# Patient Record
Sex: FEMALE | Race: WHITE | Employment: OTHER | URBAN - METROPOLITAN AREA
[De-identification: names, ages, dates, MRNs, and addresses within clinical notes are randomized per-mention and may not be internally consistent; named-entity substitution may affect disease eponyms.]

---

## 2017-02-10 DIAGNOSIS — I48.0 PAROXYSMAL ATRIAL FIBRILLATION (HCC): ICD-10-CM

## 2017-02-10 DIAGNOSIS — E55.9 VITAMIN D DEFICIENCY: ICD-10-CM

## 2017-02-10 DIAGNOSIS — E78.2 MIXED HYPERLIPIDEMIA: ICD-10-CM

## 2017-02-10 DIAGNOSIS — E53.8 B12 DEFICIENCY: ICD-10-CM

## 2017-02-10 LAB
A/G RATIO: 1.5 (ref 1.1–2.2)
ALBUMIN SERPL-MCNC: 4.4 G/DL (ref 3.4–5)
ALP BLD-CCNC: 77 U/L (ref 40–129)
ALT SERPL-CCNC: 14 U/L (ref 10–40)
ANION GAP SERPL CALCULATED.3IONS-SCNC: 13 MMOL/L (ref 3–16)
AST SERPL-CCNC: 18 U/L (ref 15–37)
BASOPHILS ABSOLUTE: 0 K/UL (ref 0–0.2)
BASOPHILS RELATIVE PERCENT: 0.2 %
BILIRUB SERPL-MCNC: 0.7 MG/DL (ref 0–1)
BUN BLDV-MCNC: 20 MG/DL (ref 7–20)
CALCIUM SERPL-MCNC: 9.5 MG/DL (ref 8.3–10.6)
CHLORIDE BLD-SCNC: 99 MMOL/L (ref 99–110)
CHOLESTEROL, TOTAL: 201 MG/DL (ref 0–199)
CO2: 27 MMOL/L (ref 21–32)
CREAT SERPL-MCNC: 0.9 MG/DL (ref 0.6–1.2)
EOSINOPHILS ABSOLUTE: 0 K/UL (ref 0–0.6)
EOSINOPHILS RELATIVE PERCENT: 0.1 %
GFR AFRICAN AMERICAN: >60
GFR NON-AFRICAN AMERICAN: >60
GLOBULIN: 2.9 G/DL
GLUCOSE BLD-MCNC: 98 MG/DL (ref 70–99)
HCT VFR BLD CALC: 43.4 % (ref 36–48)
HDLC SERPL-MCNC: 51 MG/DL (ref 40–60)
HEMOGLOBIN: 14.4 G/DL (ref 12–16)
LDL CHOLESTEROL CALCULATED: 125 MG/DL
LYMPHOCYTES ABSOLUTE: 1.7 K/UL (ref 1–5.1)
LYMPHOCYTES RELATIVE PERCENT: 38.2 %
MCH RBC QN AUTO: 30.1 PG (ref 26–34)
MCHC RBC AUTO-ENTMCNC: 33.1 G/DL (ref 31–36)
MCV RBC AUTO: 90.8 FL (ref 80–100)
MONOCYTES ABSOLUTE: 0.4 K/UL (ref 0–1.3)
MONOCYTES RELATIVE PERCENT: 9.8 %
NEUTROPHILS ABSOLUTE: 2.3 K/UL (ref 1.7–7.7)
NEUTROPHILS RELATIVE PERCENT: 51.7 %
PDW BLD-RTO: 12.7 % (ref 12.4–15.4)
PLATELET # BLD: 170 K/UL (ref 135–450)
PMV BLD AUTO: 8.2 FL (ref 5–10.5)
POTASSIUM SERPL-SCNC: 4.9 MMOL/L (ref 3.5–5.1)
RBC # BLD: 4.78 M/UL (ref 4–5.2)
SODIUM BLD-SCNC: 139 MMOL/L (ref 136–145)
TOTAL PROTEIN: 7.3 G/DL (ref 6.4–8.2)
TRIGL SERPL-MCNC: 125 MG/DL (ref 0–150)
VLDLC SERPL CALC-MCNC: 25 MG/DL
WBC # BLD: 4.5 K/UL (ref 4–11)

## 2017-02-11 LAB
T4 FREE: 1.6 NG/DL (ref 0.9–1.8)
TSH SERPL DL<=0.05 MIU/L-ACNC: 1.27 UIU/ML (ref 0.27–4.2)
VITAMIN B-12: 556 PG/ML (ref 211–911)
VITAMIN D 25-HYDROXY: 37.8 NG/ML

## 2017-02-15 ENCOUNTER — OFFICE VISIT (OUTPATIENT)
Dept: INTERNAL MEDICINE CLINIC | Age: 77
End: 2017-02-15

## 2017-02-15 VITALS
HEART RATE: 71 BPM | BODY MASS INDEX: 27.49 KG/M2 | DIASTOLIC BLOOD PRESSURE: 73 MMHG | WEIGHT: 161 LBS | HEIGHT: 64 IN | RESPIRATION RATE: 17 BRPM | SYSTOLIC BLOOD PRESSURE: 123 MMHG | OXYGEN SATURATION: 98 %

## 2017-02-15 DIAGNOSIS — E53.8 B12 DEFICIENCY: ICD-10-CM

## 2017-02-15 DIAGNOSIS — E78.2 MIXED HYPERLIPIDEMIA: ICD-10-CM

## 2017-02-15 DIAGNOSIS — Z12.31 VISIT FOR SCREENING MAMMOGRAM: ICD-10-CM

## 2017-02-15 DIAGNOSIS — E55.9 VITAMIN D DEFICIENCY: ICD-10-CM

## 2017-02-15 DIAGNOSIS — E03.4 HYPOTHYROIDISM DUE TO ACQUIRED ATROPHY OF THYROID: ICD-10-CM

## 2017-02-15 DIAGNOSIS — I48.0 PAROXYSMAL ATRIAL FIBRILLATION (HCC): Primary | ICD-10-CM

## 2017-02-15 DIAGNOSIS — J30.89 PERENNIAL ALLERGIC RHINITIS, UNSPECIFIED ALLERGIC RHINITIS TRIGGER: ICD-10-CM

## 2017-02-15 DIAGNOSIS — M81.0 OSTEOPOROSIS: ICD-10-CM

## 2017-02-15 PROCEDURE — 3288F FALL RISK ASSESSMENT DOCD: CPT | Performed by: INTERNAL MEDICINE

## 2017-02-15 PROCEDURE — 99214 OFFICE O/P EST MOD 30 MIN: CPT | Performed by: INTERNAL MEDICINE

## 2017-02-15 PROCEDURE — G8510 SCR DEP NEG, NO PLAN REQD: HCPCS | Performed by: INTERNAL MEDICINE

## 2017-02-15 RX ORDER — LEVOTHYROXINE SODIUM 137 UG/1
137 TABLET ORAL DAILY
Qty: 90 TABLET | Refills: 1 | Status: SHIPPED | OUTPATIENT
Start: 2017-02-15 | End: 2017-08-21 | Stop reason: SDUPTHER

## 2017-02-15 RX ORDER — PROPAFENONE HYDROCHLORIDE 150 MG/1
150 TABLET, FILM COATED ORAL EVERY 8 HOURS
Qty: 90 TABLET | Refills: 3 | Status: SHIPPED | OUTPATIENT
Start: 2017-02-15 | End: 2017-08-21 | Stop reason: SDUPTHER

## 2017-02-15 RX ORDER — FLUTICASONE PROPIONATE 50 MCG
2 SPRAY, SUSPENSION (ML) NASAL DAILY
Qty: 1 BOTTLE | Refills: 3 | Status: SHIPPED | OUTPATIENT
Start: 2017-02-15 | End: 2018-02-21 | Stop reason: SDUPTHER

## 2017-02-15 ASSESSMENT — PATIENT HEALTH QUESTIONNAIRE - PHQ9
SUM OF ALL RESPONSES TO PHQ QUESTIONS 1-9: 0
1. LITTLE INTEREST OR PLEASURE IN DOING THINGS: 0
SUM OF ALL RESPONSES TO PHQ9 QUESTIONS 1 & 2: 0
2. FEELING DOWN, DEPRESSED OR HOPELESS: 0

## 2017-08-14 ENCOUNTER — TELEPHONE (OUTPATIENT)
Dept: CARDIOLOGY CLINIC | Age: 77
End: 2017-08-14

## 2017-08-16 DIAGNOSIS — E03.4 HYPOTHYROIDISM DUE TO ACQUIRED ATROPHY OF THYROID: ICD-10-CM

## 2017-08-16 DIAGNOSIS — E78.2 MIXED HYPERLIPIDEMIA: ICD-10-CM

## 2017-08-16 LAB
A/G RATIO: 1.6 (ref 1.1–2.2)
ALBUMIN SERPL-MCNC: 4.3 G/DL (ref 3.4–5)
ALP BLD-CCNC: 64 U/L (ref 40–129)
ALT SERPL-CCNC: 12 U/L (ref 10–40)
ANION GAP SERPL CALCULATED.3IONS-SCNC: 12 MMOL/L (ref 3–16)
AST SERPL-CCNC: 18 U/L (ref 15–37)
BASOPHILS ABSOLUTE: 0 K/UL (ref 0–0.2)
BASOPHILS RELATIVE PERCENT: 0.3 %
BILIRUB SERPL-MCNC: 0.6 MG/DL (ref 0–1)
BUN BLDV-MCNC: 22 MG/DL (ref 7–20)
CALCIUM SERPL-MCNC: 9.2 MG/DL (ref 8.3–10.6)
CHLORIDE BLD-SCNC: 104 MMOL/L (ref 99–110)
CHOLESTEROL, TOTAL: 184 MG/DL (ref 0–199)
CO2: 27 MMOL/L (ref 21–32)
CREAT SERPL-MCNC: 0.8 MG/DL (ref 0.6–1.2)
EOSINOPHILS ABSOLUTE: 0 K/UL (ref 0–0.6)
EOSINOPHILS RELATIVE PERCENT: 0.1 %
GFR AFRICAN AMERICAN: >60
GFR NON-AFRICAN AMERICAN: >60
GLOBULIN: 2.7 G/DL
GLUCOSE BLD-MCNC: 88 MG/DL (ref 70–99)
HCT VFR BLD CALC: 42.5 % (ref 36–48)
HDLC SERPL-MCNC: 47 MG/DL (ref 40–60)
HEMOGLOBIN: 14.3 G/DL (ref 12–16)
LDL CHOLESTEROL CALCULATED: 114 MG/DL
LYMPHOCYTES ABSOLUTE: 1.7 K/UL (ref 1–5.1)
LYMPHOCYTES RELATIVE PERCENT: 41.5 %
MCH RBC QN AUTO: 30.9 PG (ref 26–34)
MCHC RBC AUTO-ENTMCNC: 33.5 G/DL (ref 31–36)
MCV RBC AUTO: 92.3 FL (ref 80–100)
MONOCYTES ABSOLUTE: 0.5 K/UL (ref 0–1.3)
MONOCYTES RELATIVE PERCENT: 11.3 %
NEUTROPHILS ABSOLUTE: 1.9 K/UL (ref 1.7–7.7)
NEUTROPHILS RELATIVE PERCENT: 46.8 %
PDW BLD-RTO: 12.7 % (ref 12.4–15.4)
PLATELET # BLD: 177 K/UL (ref 135–450)
PMV BLD AUTO: 8.3 FL (ref 5–10.5)
POTASSIUM SERPL-SCNC: 4.4 MMOL/L (ref 3.5–5.1)
RBC # BLD: 4.61 M/UL (ref 4–5.2)
SODIUM BLD-SCNC: 143 MMOL/L (ref 136–145)
T4 FREE: 1.5 NG/DL (ref 0.9–1.8)
TOTAL PROTEIN: 7 G/DL (ref 6.4–8.2)
TRIGL SERPL-MCNC: 116 MG/DL (ref 0–150)
TSH SERPL DL<=0.05 MIU/L-ACNC: 1.49 UIU/ML (ref 0.27–4.2)
VLDLC SERPL CALC-MCNC: 23 MG/DL
WBC # BLD: 4 K/UL (ref 4–11)

## 2017-08-18 ENCOUNTER — HOSPITAL ENCOUNTER (OUTPATIENT)
Dept: WOMENS IMAGING | Age: 77
Discharge: HOME | End: 2017-08-18
Attending: INTERNAL MEDICINE | Admitting: INTERNAL MEDICINE

## 2017-08-18 DIAGNOSIS — M81.0 AGE-RELATED OSTEOPOROSIS WITHOUT CURRENT PATHOLOGICAL FRACTURE: ICD-10-CM

## 2017-08-18 DIAGNOSIS — M81.0 OSTEOPOROSIS, UNSPECIFIED OSTEOPOROSIS TYPE, UNSPECIFIED PATHOLOGICAL FRACTURE PRESENCE: ICD-10-CM

## 2017-08-18 DIAGNOSIS — Z12.31 VISIT FOR SCREENING MAMMOGRAM: ICD-10-CM

## 2017-08-21 ENCOUNTER — OFFICE VISIT (OUTPATIENT)
Dept: INTERNAL MEDICINE CLINIC | Age: 77
End: 2017-08-21

## 2017-08-21 VITALS
BODY MASS INDEX: 26.61 KG/M2 | OXYGEN SATURATION: 98 % | HEART RATE: 71 BPM | DIASTOLIC BLOOD PRESSURE: 74 MMHG | SYSTOLIC BLOOD PRESSURE: 120 MMHG | WEIGHT: 155 LBS | RESPIRATION RATE: 16 BRPM

## 2017-08-21 DIAGNOSIS — Z00.00 ROUTINE GENERAL MEDICAL EXAMINATION AT A HEALTH CARE FACILITY: Primary | ICD-10-CM

## 2017-08-21 DIAGNOSIS — Z23 NEED FOR PNEUMOCOCCAL VACCINATION: ICD-10-CM

## 2017-08-21 DIAGNOSIS — E55.9 VITAMIN D DEFICIENCY: ICD-10-CM

## 2017-08-21 DIAGNOSIS — E78.2 MIXED HYPERLIPIDEMIA: ICD-10-CM

## 2017-08-21 DIAGNOSIS — E03.4 HYPOTHYROIDISM DUE TO ACQUIRED ATROPHY OF THYROID: ICD-10-CM

## 2017-08-21 DIAGNOSIS — I48.0 PAROXYSMAL ATRIAL FIBRILLATION (HCC): ICD-10-CM

## 2017-08-21 DIAGNOSIS — N81.10 VAGINAL PROLAPSE: ICD-10-CM

## 2017-08-21 DIAGNOSIS — E53.8 B12 DEFICIENCY: ICD-10-CM

## 2017-08-21 PROCEDURE — 90670 PCV13 VACCINE IM: CPT | Performed by: INTERNAL MEDICINE

## 2017-08-21 PROCEDURE — G0439 PPPS, SUBSEQ VISIT: HCPCS | Performed by: INTERNAL MEDICINE

## 2017-08-21 PROCEDURE — G0009 ADMIN PNEUMOCOCCAL VACCINE: HCPCS | Performed by: INTERNAL MEDICINE

## 2017-08-21 RX ORDER — PROPAFENONE HYDROCHLORIDE 150 MG/1
150 TABLET, FILM COATED ORAL EVERY 8 HOURS
Qty: 90 TABLET | Refills: 3 | Status: SHIPPED | OUTPATIENT
Start: 2017-08-21 | End: 2018-02-21 | Stop reason: SDUPTHER

## 2017-08-21 RX ORDER — LEVOTHYROXINE SODIUM 137 UG/1
137 TABLET ORAL DAILY
Qty: 90 TABLET | Refills: 1 | Status: SHIPPED | OUTPATIENT
Start: 2017-08-21 | End: 2018-02-21 | Stop reason: SDUPTHER

## 2017-08-21 ASSESSMENT — LIFESTYLE VARIABLES: HOW OFTEN DO YOU HAVE A DRINK CONTAINING ALCOHOL: 0

## 2017-08-21 ASSESSMENT — ANXIETY QUESTIONNAIRES: GAD7 TOTAL SCORE: 2

## 2017-08-21 ASSESSMENT — PATIENT HEALTH QUESTIONNAIRE - PHQ9: SUM OF ALL RESPONSES TO PHQ QUESTIONS 1-9: 0

## 2017-08-25 ENCOUNTER — OFFICE VISIT (OUTPATIENT)
Dept: CARDIOLOGY CLINIC | Age: 77
End: 2017-08-25

## 2017-08-25 VITALS
HEART RATE: 66 BPM | WEIGHT: 155.2 LBS | BODY MASS INDEX: 25.86 KG/M2 | SYSTOLIC BLOOD PRESSURE: 130 MMHG | DIASTOLIC BLOOD PRESSURE: 90 MMHG | HEIGHT: 65 IN

## 2017-08-25 DIAGNOSIS — I48.0 PAROXYSMAL ATRIAL FIBRILLATION (HCC): Primary | ICD-10-CM

## 2017-08-25 PROCEDURE — 99213 OFFICE O/P EST LOW 20 MIN: CPT | Performed by: INTERNAL MEDICINE

## 2017-08-25 PROCEDURE — 93000 ELECTROCARDIOGRAM COMPLETE: CPT | Performed by: INTERNAL MEDICINE

## 2017-08-25 RX ORDER — CHOLECALCIFEROL (VITAMIN D3) 25 MCG
TABLET,CHEWABLE ORAL
COMMUNITY

## 2018-02-19 DIAGNOSIS — E03.4 HYPOTHYROIDISM DUE TO ACQUIRED ATROPHY OF THYROID: ICD-10-CM

## 2018-02-19 DIAGNOSIS — E78.2 MIXED HYPERLIPIDEMIA: ICD-10-CM

## 2018-02-19 DIAGNOSIS — E53.8 B12 DEFICIENCY: ICD-10-CM

## 2018-02-19 DIAGNOSIS — E55.9 VITAMIN D DEFICIENCY: ICD-10-CM

## 2018-02-19 LAB
CHOLESTEROL, TOTAL: 193 MG/DL (ref 0–199)
HDLC SERPL-MCNC: 53 MG/DL (ref 40–60)
LDL CHOLESTEROL CALCULATED: 119 MG/DL
T4 FREE: 1.6 NG/DL (ref 0.9–1.8)
TRIGL SERPL-MCNC: 107 MG/DL (ref 0–150)
TSH SERPL DL<=0.05 MIU/L-ACNC: 2.6 UIU/ML (ref 0.27–4.2)
VITAMIN B-12: 578 PG/ML (ref 211–911)
VITAMIN D 25-HYDROXY: 35.7 NG/ML
VLDLC SERPL CALC-MCNC: 21 MG/DL

## 2018-02-21 ENCOUNTER — OFFICE VISIT (OUTPATIENT)
Dept: INTERNAL MEDICINE CLINIC | Age: 78
End: 2018-02-21

## 2018-02-21 VITALS
WEIGHT: 159 LBS | HEART RATE: 75 BPM | BODY MASS INDEX: 26.46 KG/M2 | OXYGEN SATURATION: 98 % | DIASTOLIC BLOOD PRESSURE: 72 MMHG | SYSTOLIC BLOOD PRESSURE: 100 MMHG | RESPIRATION RATE: 16 BRPM

## 2018-02-21 DIAGNOSIS — I48.0 PAROXYSMAL ATRIAL FIBRILLATION (HCC): Primary | ICD-10-CM

## 2018-02-21 DIAGNOSIS — M81.0 AGE-RELATED OSTEOPOROSIS WITHOUT CURRENT PATHOLOGICAL FRACTURE: ICD-10-CM

## 2018-02-21 DIAGNOSIS — J30.89 CHRONIC NON-SEASONAL ALLERGIC RHINITIS, UNSPECIFIED TRIGGER: ICD-10-CM

## 2018-02-21 DIAGNOSIS — E03.4 HYPOTHYROIDISM DUE TO ACQUIRED ATROPHY OF THYROID: ICD-10-CM

## 2018-02-21 DIAGNOSIS — E78.2 MIXED HYPERLIPIDEMIA: ICD-10-CM

## 2018-02-21 DIAGNOSIS — E53.8 B12 DEFICIENCY: ICD-10-CM

## 2018-02-21 PROCEDURE — 4040F PNEUMOC VAC/ADMIN/RCVD: CPT | Performed by: INTERNAL MEDICINE

## 2018-02-21 PROCEDURE — 99214 OFFICE O/P EST MOD 30 MIN: CPT | Performed by: INTERNAL MEDICINE

## 2018-02-21 PROCEDURE — G8399 PT W/DXA RESULTS DOCUMENT: HCPCS | Performed by: INTERNAL MEDICINE

## 2018-02-21 PROCEDURE — 1123F ACP DISCUSS/DSCN MKR DOCD: CPT | Performed by: INTERNAL MEDICINE

## 2018-02-21 PROCEDURE — G8484 FLU IMMUNIZE NO ADMIN: HCPCS | Performed by: INTERNAL MEDICINE

## 2018-02-21 PROCEDURE — G8427 DOCREV CUR MEDS BY ELIG CLIN: HCPCS | Performed by: INTERNAL MEDICINE

## 2018-02-21 PROCEDURE — 1090F PRES/ABSN URINE INCON ASSESS: CPT | Performed by: INTERNAL MEDICINE

## 2018-02-21 PROCEDURE — 1036F TOBACCO NON-USER: CPT | Performed by: INTERNAL MEDICINE

## 2018-02-21 PROCEDURE — G8419 CALC BMI OUT NRM PARAM NOF/U: HCPCS | Performed by: INTERNAL MEDICINE

## 2018-02-21 RX ORDER — FLUTICASONE PROPIONATE 50 MCG
2 SPRAY, SUSPENSION (ML) NASAL DAILY
Qty: 1 BOTTLE | Refills: 3 | Status: SHIPPED | OUTPATIENT
Start: 2018-02-21 | End: 2019-02-21 | Stop reason: SDUPTHER

## 2018-02-21 RX ORDER — PROPAFENONE HYDROCHLORIDE 150 MG/1
150 TABLET, FILM COATED ORAL EVERY 8 HOURS
Qty: 90 TABLET | Refills: 3 | Status: SHIPPED | OUTPATIENT
Start: 2018-02-21 | End: 2018-08-21 | Stop reason: SDUPTHER

## 2018-02-21 RX ORDER — LEVOTHYROXINE SODIUM 137 UG/1
137 TABLET ORAL DAILY
Qty: 90 TABLET | Refills: 1 | Status: SHIPPED | OUTPATIENT
Start: 2018-02-21 | End: 2018-08-21 | Stop reason: SDUPTHER

## 2018-08-17 DIAGNOSIS — E78.2 MIXED HYPERLIPIDEMIA: ICD-10-CM

## 2018-08-17 DIAGNOSIS — E03.4 HYPOTHYROIDISM DUE TO ACQUIRED ATROPHY OF THYROID: ICD-10-CM

## 2018-08-17 DIAGNOSIS — E55.9 VITAMIN D DEFICIENCY: ICD-10-CM

## 2018-08-17 DIAGNOSIS — E53.8 B12 DEFICIENCY: Primary | ICD-10-CM

## 2018-08-17 DIAGNOSIS — E53.8 B12 DEFICIENCY: ICD-10-CM

## 2018-08-17 LAB
A/G RATIO: 1.6 (ref 1.1–2.2)
ALBUMIN SERPL-MCNC: 4.5 G/DL (ref 3.4–5)
ALP BLD-CCNC: 72 U/L (ref 40–129)
ALT SERPL-CCNC: 13 U/L (ref 10–40)
ANION GAP SERPL CALCULATED.3IONS-SCNC: 10 MMOL/L (ref 3–16)
AST SERPL-CCNC: 19 U/L (ref 15–37)
BASOPHILS ABSOLUTE: 0 K/UL (ref 0–0.2)
BASOPHILS RELATIVE PERCENT: 0.4 %
BILIRUB SERPL-MCNC: 0.7 MG/DL (ref 0–1)
BUN BLDV-MCNC: 19 MG/DL (ref 7–20)
CALCIUM SERPL-MCNC: 9.3 MG/DL (ref 8.3–10.6)
CHLORIDE BLD-SCNC: 102 MMOL/L (ref 99–110)
CHOLESTEROL, TOTAL: 203 MG/DL (ref 0–199)
CO2: 28 MMOL/L (ref 21–32)
CREAT SERPL-MCNC: 1 MG/DL (ref 0.6–1.2)
EOSINOPHILS ABSOLUTE: 0 K/UL (ref 0–0.6)
EOSINOPHILS RELATIVE PERCENT: 0 %
GFR AFRICAN AMERICAN: >60
GFR NON-AFRICAN AMERICAN: 53
GLOBULIN: 2.9 G/DL
GLUCOSE BLD-MCNC: 89 MG/DL (ref 70–99)
HCT VFR BLD CALC: 43.3 % (ref 36–48)
HDLC SERPL-MCNC: 48 MG/DL (ref 40–60)
HEMOGLOBIN: 14.6 G/DL (ref 12–16)
LDL CHOLESTEROL CALCULATED: 133 MG/DL
LYMPHOCYTES ABSOLUTE: 1.6 K/UL (ref 1–5.1)
LYMPHOCYTES RELATIVE PERCENT: 41.7 %
MCH RBC QN AUTO: 30.8 PG (ref 26–34)
MCHC RBC AUTO-ENTMCNC: 33.7 G/DL (ref 31–36)
MCV RBC AUTO: 91.5 FL (ref 80–100)
MONOCYTES ABSOLUTE: 0.4 K/UL (ref 0–1.3)
MONOCYTES RELATIVE PERCENT: 11.6 %
NEUTROPHILS ABSOLUTE: 1.7 K/UL (ref 1.7–7.7)
NEUTROPHILS RELATIVE PERCENT: 46.3 %
PDW BLD-RTO: 12.8 % (ref 12.4–15.4)
PLATELET # BLD: 191 K/UL (ref 135–450)
PMV BLD AUTO: 8.3 FL (ref 5–10.5)
POTASSIUM SERPL-SCNC: 4.8 MMOL/L (ref 3.5–5.1)
RBC # BLD: 4.74 M/UL (ref 4–5.2)
SODIUM BLD-SCNC: 140 MMOL/L (ref 136–145)
T4 FREE: 1.6 NG/DL (ref 0.9–1.8)
TOTAL PROTEIN: 7.4 G/DL (ref 6.4–8.2)
TRIGL SERPL-MCNC: 111 MG/DL (ref 0–150)
TSH REFLEX: 2.25 UIU/ML (ref 0.27–4.2)
VITAMIN B-12: 644 PG/ML (ref 211–911)
VITAMIN D 25-HYDROXY: 32.9 NG/ML
VLDLC SERPL CALC-MCNC: 22 MG/DL
WBC # BLD: 3.8 K/UL (ref 4–11)

## 2018-08-20 ENCOUNTER — HOSPITAL ENCOUNTER (OUTPATIENT)
Dept: WOMENS IMAGING | Age: 78
Discharge: HOME | End: 2018-08-20
Attending: INTERNAL MEDICINE | Admitting: INTERNAL MEDICINE

## 2018-08-20 DIAGNOSIS — Z12.31 ENCOUNTER FOR SCREENING MAMMOGRAM FOR BREAST CANCER: ICD-10-CM

## 2018-08-21 ENCOUNTER — OFFICE VISIT (OUTPATIENT)
Dept: INTERNAL MEDICINE CLINIC | Age: 78
End: 2018-08-21

## 2018-08-21 VITALS
HEART RATE: 56 BPM | OXYGEN SATURATION: 97 % | DIASTOLIC BLOOD PRESSURE: 64 MMHG | WEIGHT: 158 LBS | HEIGHT: 65 IN | BODY MASS INDEX: 26.33 KG/M2 | RESPIRATION RATE: 16 BRPM | SYSTOLIC BLOOD PRESSURE: 122 MMHG

## 2018-08-21 DIAGNOSIS — I48.0 PAROXYSMAL ATRIAL FIBRILLATION (HCC): ICD-10-CM

## 2018-08-21 DIAGNOSIS — E53.8 B12 DEFICIENCY: ICD-10-CM

## 2018-08-21 DIAGNOSIS — Z00.00 ROUTINE GENERAL MEDICAL EXAMINATION AT A HEALTH CARE FACILITY: Primary | ICD-10-CM

## 2018-08-21 DIAGNOSIS — E03.4 HYPOTHYROIDISM DUE TO ACQUIRED ATROPHY OF THYROID: ICD-10-CM

## 2018-08-21 DIAGNOSIS — E78.2 MIXED HYPERLIPIDEMIA: ICD-10-CM

## 2018-08-21 DIAGNOSIS — Z23 NEED FOR VACCINATION AGAINST STREPTOCOCCUS PNEUMONIAE: ICD-10-CM

## 2018-08-21 PROCEDURE — G0439 PPPS, SUBSEQ VISIT: HCPCS | Performed by: INTERNAL MEDICINE

## 2018-08-21 PROCEDURE — 4040F PNEUMOC VAC/ADMIN/RCVD: CPT | Performed by: INTERNAL MEDICINE

## 2018-08-21 RX ORDER — LEVOTHYROXINE SODIUM 137 UG/1
137 TABLET ORAL DAILY
Qty: 90 TABLET | Refills: 1 | Status: SHIPPED | OUTPATIENT
Start: 2018-08-21 | End: 2019-02-21 | Stop reason: SDUPTHER

## 2018-08-21 RX ORDER — PROPAFENONE HYDROCHLORIDE 150 MG/1
150 TABLET, FILM COATED ORAL EVERY 8 HOURS
Qty: 90 TABLET | Refills: 1 | Status: SHIPPED | OUTPATIENT
Start: 2018-08-21 | End: 2019-02-21 | Stop reason: SDUPTHER

## 2018-08-21 ASSESSMENT — LIFESTYLE VARIABLES: HOW OFTEN DO YOU HAVE A DRINK CONTAINING ALCOHOL: 0

## 2018-08-21 ASSESSMENT — PATIENT HEALTH QUESTIONNAIRE - PHQ9
SUM OF ALL RESPONSES TO PHQ QUESTIONS 1-9: 0
SUM OF ALL RESPONSES TO PHQ QUESTIONS 1-9: 0

## 2018-08-21 ASSESSMENT — ANXIETY QUESTIONNAIRES: GAD7 TOTAL SCORE: 0

## 2018-08-21 NOTE — PROGRESS NOTES
Other         strong family history of clotting problems       CareTeam (Including outside providers/suppliers regularly involved in providing care):   Patient Care Team:  Van Suazo MD as PCP - General (Internal Medicine)  Aviva Hutchinson MD as Consulting Physician (Cardiology)  Vamshi Carty MD as Consulting Physician (Cardiology)    Wt Readings from Last 3 Encounters:   08/21/18 158 lb (71.7 kg)   02/21/18 159 lb (72.1 kg)   08/25/17 155 lb 3.2 oz (70.4 kg)     Vitals:    08/21/18 0950   BP: 122/64   Pulse: 56   Resp: 16   SpO2: 97%   Weight: 158 lb (71.7 kg)   Height: 5' 5\" (1.651 m)     Body mass index is 26.29 kg/m². General Appearance: alert and oriented to person, place and time, well developed and well- nourished, in no acute distress  Skin: warm and dry, no rash or erythema  Head: normocephalic and atraumatic  Eyes: pupils equal, round, and reactive to light, extraocular eye movements intact, conjunctivae normal  ENT:   nose without deformity, nasal mucosa and turbinates normal without polyps  Neck: supple and non-tender without mass, no thyromegaly or thyroid nodules, no cervical lymphadenopathy  Pulmonary/Chest: clear to auscultation bilaterally- no wheezes, rales or rhonchi, normal air movement, no respiratory distress  Cardiovascular: normal rate, regular rhythm, normal S1 and S2, no murmurs, rubs, clicks, or gallops, distal pulses intact, no carotid bruits  Abdomen: soft, non-tender, non-distended, normal bowel sounds, no masses or organomegaly  Extremities: no cyanosis, clubbing or edema  Musculoskeletal: normal range of motion, no joint swelling, deformity or tenderness  Neurologic:   no cranial nerve deficit, gait, coordination and speech normal    Patient's complete Health Risk Assessment and screening values have been reviewed and are found in Flowsheets.  The following problems were reviewed today and where indicated follow up appointments were made and/or referrals continue to work on a low fat diet and also exercise, wt loss as appropriate. Will continue periodic monitoring of fasting lipid profile, glucose, liver function.    -     Lipid Panel;  Future    B12 deficiency- CONT MONITOR  -     Vitamin B12; Future    Need for vaccination against Streptococcus pneumoniae- PT DEFERS BUT IS DUE, ALSO DEFERS SHINGRIX  -     Cancel: PNEUMOVAX 23 subcutaneous/IM (Pneumococcal polysaccharide vaccine 23-valent >= 1yo)

## 2018-08-27 ENCOUNTER — OFFICE VISIT (OUTPATIENT)
Dept: CARDIOLOGY CLINIC | Age: 78
End: 2018-08-27

## 2018-08-27 VITALS
HEIGHT: 65 IN | BODY MASS INDEX: 26.09 KG/M2 | WEIGHT: 156.6 LBS | DIASTOLIC BLOOD PRESSURE: 60 MMHG | HEART RATE: 72 BPM | SYSTOLIC BLOOD PRESSURE: 110 MMHG

## 2018-08-27 DIAGNOSIS — I48.0 PAROXYSMAL ATRIAL FIBRILLATION (HCC): Primary | ICD-10-CM

## 2018-08-27 PROCEDURE — 93000 ELECTROCARDIOGRAM COMPLETE: CPT | Performed by: INTERNAL MEDICINE

## 2018-08-27 PROCEDURE — 1036F TOBACCO NON-USER: CPT | Performed by: INTERNAL MEDICINE

## 2018-08-27 PROCEDURE — 1090F PRES/ABSN URINE INCON ASSESS: CPT | Performed by: INTERNAL MEDICINE

## 2018-08-27 PROCEDURE — 1101F PT FALLS ASSESS-DOCD LE1/YR: CPT | Performed by: INTERNAL MEDICINE

## 2018-08-27 PROCEDURE — G8419 CALC BMI OUT NRM PARAM NOF/U: HCPCS | Performed by: INTERNAL MEDICINE

## 2018-08-27 PROCEDURE — 4040F PNEUMOC VAC/ADMIN/RCVD: CPT | Performed by: INTERNAL MEDICINE

## 2018-08-27 PROCEDURE — G8427 DOCREV CUR MEDS BY ELIG CLIN: HCPCS | Performed by: INTERNAL MEDICINE

## 2018-08-27 PROCEDURE — 99213 OFFICE O/P EST LOW 20 MIN: CPT | Performed by: INTERNAL MEDICINE

## 2018-08-27 PROCEDURE — G8399 PT W/DXA RESULTS DOCUMENT: HCPCS | Performed by: INTERNAL MEDICINE

## 2018-08-27 PROCEDURE — 1123F ACP DISCUSS/DSCN MKR DOCD: CPT | Performed by: INTERNAL MEDICINE

## 2018-08-27 NOTE — PROGRESS NOTES
CARDIOLOGY NOTE      8/27/2018    RE: Rio Parkinson  (1940)                               TO:  Dr. Chuck Calzada MD      Thank you for involving me in taking care of your  patient Rio Parkinson, who is a  [de-identified]y.o. year old      female with past medical  history of  A fib  is  seen today Patient  during this  visit has no cardiac complains. Vitals:    08/27/18 0954   BP: 110/60   Pulse: 72       Current Outpatient Prescriptions   Medication Sig Dispense Refill    propafenone (RYTHMOL) 150 MG tablet Take 1 tablet by mouth every 8 hours 90 tablet 1    levothyroxine (SYNTHROID) 137 MCG tablet Take 1 tablet by mouth daily Dispense as written, BRAND NAME ONLY 90 tablet 1    fluticasone (FLONASE) 50 MCG/ACT nasal spray 2 sprays by Nasal route daily 1 Bottle 3    Cyanocobalamin (B-12) 1000 MCG CAPS Take by mouth      Cholecalciferol (VITAMIN D3) 1000 UNITS TABS Take 1 tablet by mouth daily. 30 tablet 0     No current facility-administered medications for this visit. Allergies: Vicodin [hydrocodone-acetaminophen]  Past Medical History:   Diagnosis Date    AF (atrial fibrillation) (HCC)     Allergic rhinitis due to other allergen     Anxiety     Arthritis     Arthritis of big toe 6/12    Dr Isaac Lyons- bilateral, treating w orthotics    B12 deficiency     BCC (basal cell carcinoma), face 8/13/13    L cheek- Dr Bird Kurk tunnel syndrome     Congenital prolapse of bladder mucosa     DDD (degenerative disc disease), lumbar     Female incontinence     H/O cardiovascular stress test 1/14/2010, 2/6/2007 1/14/2010-Normal perfusion. Normal LVSF. EF 70%;    H/O echocardiogram 1/7/2010, 2/6/2007 1/7/2010-Mild concentric LVH. LVSF normal. EF =>55%.  History of Doppler ultrasound     8/19/2009-Renal US- Normal    Homocysteinemia ([address]     HX OTHER MEDICAL     8/4/2010-48 HR HOLTER MONITOR-Pred rhythm is sinus. No episodes of sustained ectopy seen.  12/2005-30 DAY EVENT MONITOR- Atrial fibrillation;    Hyperlipidemia     Hypothyroid     Lumbar herniated disc 3/2011    on MRI L1-2 and L4-5    Menopause     seeing Dr Dallin Barney MTHFR mutation (methylenetetrahydrofolate reductase) ([address]     gene was heterozygous-maybe slightly  increased risk of DVT or clotting problem. she is to watch for acquired risk factors-Dr Waleska Harris Osteoarthritis of foot, right     Osteoporosis     SHE REFUSED MEDS, FOSAMAX--- 15    Pain in shoulder     SBE (subacute bacterial endocarditis) prophylaxis candidate     VHD (valvular heart disease)     Severe Tricuspid Regurgitation (2005);  Mild Mitral regurgitation    Vitamin D deficiency      Past Surgical History:   Procedure Laterality Date    BLADDER SUSPENSION      Dr Larry Hutchison Bilateral 2012    Dr Peters Marrow  10/21/2009    and bladder repair    RECTOCELE REPAIR  2011    Dr. Manisha Mack Left 13    CRISTOFER soto, Dr Cindy Fernandez for [address] [place] [address]     Family History   Problem Relation Age of Onset    Cancer Mother         uterine    Diabetes Mother     Diabetes Father     Other Father          of aneurysm    Diabetes Paternal Grandmother     Clotting Disorder Paternal Uncle     Clotting Disorder Paternal Uncle     Clotting Disorder Paternal Uncle     Cancer Other         liver cancer    Cancer Maternal Aunt         breast cancer    Cancer Maternal Uncle         lung cancer    Clotting Disorder Paternal Aunt     Other Other         strong family history of clotting problems     Social History   Substance Use Topics    Smoking status: Never Smoker    Smokeless tobacco: Never Used    Alcohol use Yes      Comment: occassional wine          Review of systems:  HEENT: Neg  Card:neg   GI;Neg  : Neg  Neuro: Neg  Psych: Neg  Derm: Neg  MS; Neg  All: Documented  Constitutional: Neg    Objective:

## 2018-12-25 ENCOUNTER — APPOINTMENT (OUTPATIENT)
Dept: CT IMAGING | Age: 78
End: 2018-12-25
Payer: MEDICARE

## 2018-12-25 ENCOUNTER — HOSPITAL ENCOUNTER (EMERGENCY)
Age: 78
Discharge: HOME | End: 2018-12-25
Attending: EMERGENCY MEDICINE
Payer: MEDICARE

## 2018-12-25 ENCOUNTER — APPOINTMENT (OUTPATIENT)
Dept: GENERAL RADIOLOGY | Age: 78
End: 2018-12-25
Payer: MEDICARE

## 2018-12-25 VITALS
DIASTOLIC BLOOD PRESSURE: 92 MMHG | SYSTOLIC BLOOD PRESSURE: 112 MMHG | WEIGHT: 155 LBS | TEMPERATURE: 97.9 F | HEIGHT: 64 IN | BODY MASS INDEX: 26.46 KG/M2 | RESPIRATION RATE: 18 BRPM | HEART RATE: 72 BPM | OXYGEN SATURATION: 97 %
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
## 2018-12-25 DIAGNOSIS — R55 NEAR SYNCOPE: ICD-10-CM

## 2018-12-25 DIAGNOSIS — S09.90XA CLOSED HEAD INJURY, INITIAL ENCOUNTER: ICD-10-CM

## 2018-12-25 LAB
ALBUMIN SERPL-MCNC: 3.8 GM/DL (ref 3.4–5)
ALP BLD-CCNC: 73 IU/L (ref 40–129)
ALT SERPL-CCNC: 14 U/L (ref 10–40)
ANION GAP SERPL CALCULATED.3IONS-SCNC: 12 MMOL/L (ref 4–16)
AST SERPL-CCNC: 21 IU/L (ref 15–37)
BASOPHILS ABSOLUTE: 0 K/CU MM
BASOPHILS RELATIVE PERCENT: 0.4 % (ref 0–1)
BILIRUB SERPL-MCNC: 0.6 MG/DL (ref 0–1)
BUN BLDV-MCNC: 25 MG/DL (ref 6–23)
CALCIUM SERPL-MCNC: 8.7 MG/DL (ref 8.3–10.6)
CHLORIDE BLD-SCNC: 105 MMOL/L (ref 99–110)
CO2: 25 MMOL/L (ref 21–32)
CREAT SERPL-MCNC: 0.9 MG/DL (ref 0.6–1.1)
DIFFERENTIAL TYPE: ABNORMAL
EOSINOPHILS ABSOLUTE: 0 K/CU MM
EOSINOPHILS RELATIVE PERCENT: 0 % (ref 0–3)
GFR AFRICAN AMERICAN: >60 ML/MIN/1.73M2
GFR NON-AFRICAN AMERICAN: >60 ML/MIN/1.73M2
GLUCOSE BLD-MCNC: 118 MG/DL (ref 70–99)
HCT VFR BLD CALC: 42 % (ref 37–47)
HEMOGLOBIN: 13.7 GM/DL (ref 12.5–16)
IMMATURE NEUTROPHIL %: 0.6 % (ref 0–0.43)
INR BLD: 1.03 INDEX
LYMPHOCYTES ABSOLUTE: 1.6 K/CU MM
LYMPHOCYTES RELATIVE PERCENT: 29.9 % (ref 24–44)
MAGNESIUM: 2 MG/DL (ref 1.8–2.4)
MCH RBC QN AUTO: 30.5 PG (ref 27–31)
MCHC RBC AUTO-ENTMCNC: 32.6 % (ref 32–36)
MCV RBC AUTO: 93.5 FL (ref 78–100)
MONOCYTES ABSOLUTE: 0.6 K/CU MM
MONOCYTES RELATIVE PERCENT: 10.7 % (ref 0–4)
NUCLEATED RBC %: 0 %
PDW BLD-RTO: 12 % (ref 11.7–14.9)
PLATELET # BLD: 163 K/CU MM (ref 140–440)
PMV BLD AUTO: 9.9 FL (ref 7.5–11.1)
POTASSIUM SERPL-SCNC: 4.4 MMOL/L (ref 3.5–5.1)
PRO-BNP: 202.4 PG/ML
PROTHROMBIN TIME: 11.7 SECONDS (ref 9.12–12.5)
RBC # BLD: 4.49 M/CU MM (ref 4.2–5.4)
SEGMENTED NEUTROPHILS ABSOLUTE COUNT: 3 K/CU MM
SEGMENTED NEUTROPHILS RELATIVE PERCENT: 58.4 % (ref 36–66)
SODIUM BLD-SCNC: 142 MMOL/L (ref 135–145)
TOTAL IMMATURE NEUTOROPHIL: 0.03 K/CU MM
TOTAL NUCLEATED RBC: 0 K/CU MM
TOTAL PROTEIN: 7 GM/DL (ref 6.4–8.2)
TROPONIN T: <0.01 NG/ML
TSH HIGH SENSITIVITY: 3.36 UIU/ML (ref 0.27–4.2)
WBC # BLD: 5.2 K/CU MM (ref 4–10.5)

## 2018-12-25 PROCEDURE — 83735 ASSAY OF MAGNESIUM: CPT

## 2018-12-25 PROCEDURE — 85025 COMPLETE CBC W/AUTO DIFF WBC: CPT

## 2018-12-25 PROCEDURE — 93010 ELECTROCARDIOGRAM REPORT: CPT | Performed by: INTERNAL MEDICINE

## 2018-12-25 PROCEDURE — 85610 PROTHROMBIN TIME: CPT

## 2018-12-25 PROCEDURE — 83880 ASSAY OF NATRIURETIC PEPTIDE: CPT

## 2018-12-25 PROCEDURE — 71045 X-RAY EXAM CHEST 1 VIEW: CPT

## 2018-12-25 PROCEDURE — 72125 CT NECK SPINE W/O DYE: CPT

## 2018-12-25 PROCEDURE — 93005 ELECTROCARDIOGRAM TRACING: CPT | Performed by: PHYSICIAN ASSISTANT

## 2018-12-25 PROCEDURE — 72220 X-RAY EXAM SACRUM TAILBONE: CPT

## 2018-12-25 PROCEDURE — 84484 ASSAY OF TROPONIN QUANT: CPT

## 2018-12-25 PROCEDURE — 70450 CT HEAD/BRAIN W/O DYE: CPT

## 2018-12-25 PROCEDURE — 2580000003 HC RX 258: Performed by: PHYSICIAN ASSISTANT

## 2018-12-25 PROCEDURE — 72190 X-RAY EXAM OF PELVIS: CPT

## 2018-12-25 PROCEDURE — 84443 ASSAY THYROID STIM HORMONE: CPT

## 2018-12-25 PROCEDURE — 99285 EMERGENCY DEPT VISIT HI MDM: CPT

## 2018-12-25 PROCEDURE — 72170 X-RAY EXAM OF PELVIS: CPT

## 2018-12-25 PROCEDURE — 80053 COMPREHEN METABOLIC PANEL: CPT

## 2018-12-25 RX ORDER — 0.9 % SODIUM CHLORIDE 0.9 %
500 INTRAVENOUS SOLUTION INTRAVENOUS ONCE
Status: COMPLETED | OUTPATIENT
Start: 2018-12-25 | End: 2018-12-25

## 2018-12-25 RX ADMIN — SODIUM CHLORIDE 500 ML: 9 INJECTION, SOLUTION INTRAVENOUS at 06:53

## 2018-12-25 NOTE — ED PROVIDER NOTES
0.4 0 - 1 %    Segs Absolute 3.0 K/CU MM    Lymphocytes # 1.6 K/CU MM    Monocytes # 0.6 K/CU MM    Eosinophils # 0.0 K/CU MM    Basophils # 0.0 K/CU MM    Nucleated RBC % 0.0 %    Total Nucleated RBC 0.0 K/CU MM    Total Immature Neutrophil 0.03 K/CU MM    Immature Neutrophil % 0.6 (H) 0 - 0.43 %   Comprehensive Metabolic Panel   Result Value Ref Range    Sodium 142 135 - 145 MMOL/L    Potassium 4.4 3.5 - 5.1 MMOL/L    Chloride 105 99 - 110 mMol/L    CO2 25 21 - 32 MMOL/L    BUN 25 (H) 6 - 23 MG/DL    CREATININE 0.9 0.6 - 1.1 MG/DL    Glucose 118 (H) 70 - 99 MG/DL    Calcium 8.7 8.3 - 10.6 MG/DL    Alb 3.8 3.4 - 5.0 GM/DL    Total Protein 7.0 6.4 - 8.2 GM/DL    Total Bilirubin 0.6 0.0 - 1.0 MG/DL    ALT 14 10 - 40 U/L    AST 21 15 - 37 IU/L    Alkaline Phosphatase 73 40 - 129 IU/L    GFR Non-African American >60 >60 mL/min/1.73m2    GFR African American >60 >60 mL/min/1.73m2    Anion Gap 12 4 - 16   Troponin   Result Value Ref Range    Troponin T <0.010 <0.01 NG/ML   Brain Natriuretic Peptide   Result Value Ref Range    Pro-.4 <300 PG/ML   Protime-INR   Result Value Ref Range    Protime 11.7 9.12 - 12.5 SECONDS    INR 1.03 INDEX   TSH without Reflex   Result Value Ref Range    TSH, High Sensitivity 3.360 0.270 - 4.20 uIu/ml   Magnesium   Result Value Ref Range    Magnesium 2.0 1.8 - 2.4 mg/dl   EKG 12 Lead   Result Value Ref Range    Ventricular Rate 67 BPM    Atrial Rate 67 BPM    P-R Interval 206 ms    QRS Duration 102 ms    Q-T Interval 454 ms    QTc Calculation (Bazett) 479 ms    P Axis 68 degrees    R Axis 37 degrees    T Axis 60 degrees    Diagnosis       Normal sinus rhythm  Normal ECG  No previous ECGs available  Confirmed by Jocelynn Hutton MD, Becky Shrestha ([id number]) on 12/25/2018 8:01:32 AM           12 lead EKG per my interpretation:  Normal Sinus Rhythm at 67  Axis is   Normal  QTc is  within an acceptable range  There is no specific T wave changes appreciated. There is no specific ST wave changes appreciated.   No STEMI    Prior EKG to compare with was available and no clinically significant change and overall morphology. ED course: Pt presents as above. Emergent conditions considered. Presentation prompted initial labs, EKG and imaging. EKG with normal sinus rhythm as above. Labs are reassuring. Small IV fluid bolus was given. Patient observed throughout prolonged ED course with continued improvement. Questions sought and answered with the patient. They voice understanding and agree with plan. Instructed to return for any worsening or worrisome concerns. All diagnostic, treatment, and disposition decisions were made by myself in conjunction with the Advanced Practice Provider. For all further details of the patient's emergency department visit, please see the Advanced Practice Provider's documentation.        Arpit Ramires MD  12/30/18 [id number]

## 2018-12-25 NOTE — ED PROVIDER NOTES
murmurs  Respiratory:  Nonlabored breathing. Normal breath sounds, No wheezing  Abdomen: Bowel sounds normal, Soft, No tenderness, no masses. Musculoskeletal: No cervical/thoracic/lumbar midline spinal tenderness palpation with no paraspinal muscle tenderness as well. No anterior or lateral chest wall tenderness to palpation; no palpable crepitus. No pelvic tenderness to palpation and pelvis is stable. Extremities are atraumatic in appearance. Extremities are warm and well perfused. No tenderness palpation of all extremities. Patient has normal sensation in all extremities. No facial tenderness or facial bone instability noted. Integument:  Warm, Dry  Neurologic:  Alert & oriented , No focal deficits noted. Cranial nerves II through XII grossly intact. Finger to nose intact, rapid alternating movements intact. Normal gross motor coordination & motor strength bilateral upper and lower extremities,  upper and lower extremity DTRs intact. Sensation intact.   Psychiatric:  Affect normal, Mood normal.       Labs:  Results for orders placed or performed during the hospital encounter of 12/25/18   CBC auto diff   Result Value Ref Range    WBC 5.2 4.0 - 10.5 K/CU MM    RBC 4.49 4.2 - 5.4 M/CU MM    Hemoglobin 13.7 12.5 - 16.0 GM/DL    Hematocrit 42.0 37 - 47 %    MCV 93.5 78 - 100 FL    MCH 30.5 27 - 31 PG    MCHC 32.6 32.0 - 36.0 %    RDW 12.0 11.7 - 14.9 %    Platelets 153 958 - 556 K/CU MM    MPV 9.9 7.5 - 11.1 FL    Differential Type AUTOMATED DIFFERENTIAL     Segs Relative 58.4 36 - 66 %    Lymphocytes % 29.9 24 - 44 %    Monocytes % 10.7 (H) 0 - 4 %    Eosinophils % 0.0 0 - 3 %    Basophils % 0.4 0 - 1 %    Segs Absolute 3.0 K/CU MM    Lymphocytes # 1.6 K/CU MM    Monocytes # 0.6 K/CU MM    Eosinophils # 0.0 K/CU MM    Basophils # 0.0 K/CU MM    Nucleated RBC % 0.0 %    Total Nucleated RBC 0.0 K/CU MM    Total Immature Neutrophil 0.03 K/CU MM    Immature Neutrophil % 0.6 (H) 0 - 0.43 %   Comprehensive Metabolic Panel   Result Value Ref Range    Sodium 142 135 - 145 MMOL/L    Potassium 4.4 3.5 - 5.1 MMOL/L    Chloride 105 99 - 110 mMol/L    CO2 25 21 - 32 MMOL/L    BUN 25 (H) 6 - 23 MG/DL    CREATININE 0.9 0.6 - 1.1 MG/DL    Glucose 118 (H) 70 - 99 MG/DL    Calcium 8.7 8.3 - 10.6 MG/DL    Alb 3.8 3.4 - 5.0 GM/DL    Total Protein 7.0 6.4 - 8.2 GM/DL    Total Bilirubin 0.6 0.0 - 1.0 MG/DL    ALT 14 10 - 40 U/L    AST 21 15 - 37 IU/L    Alkaline Phosphatase 73 40 - 129 IU/L    GFR Non-African American >60 >60 mL/min/1.73m2    GFR African American >60 >60 mL/min/1.73m2    Anion Gap 12 4 - 16   Troponin   Result Value Ref Range    Troponin T <0.010 <0.01 NG/ML   Brain Natriuretic Peptide   Result Value Ref Range    Pro-.4 <300 PG/ML   Protime-INR   Result Value Ref Range    Protime 11.7 9.12 - 12.5 SECONDS    INR 1.03 INDEX   TSH without Reflex   Result Value Ref Range    TSH, High Sensitivity 3.360 0.270 - 4.20 uIu/ml   Magnesium   Result Value Ref Range    Magnesium 2.0 1.8 - 2.4 mg/dl   EKG 12 Lead   Result Value Ref Range    Ventricular Rate 67 BPM    Atrial Rate 67 BPM    P-R Interval 206 ms    QRS Duration 102 ms    Q-T Interval 454 ms    QTc Calculation (Bazett) 479 ms    P Axis 68 degrees    R Axis 37 degrees    T Axis 60 degrees    Diagnosis       Normal sinus rhythm  Normal ECG  No previous ECGs available  Confirmed by Vargas Lin MD, [facility] ([id number]) on 12/25/2018 8:01:32 AM             EKG    Please see Dr. Latasha Duenas, attending's note for full interpretation of the EKG. RADIOLOGY       CT Cervical Spine WO Contrast (Final result)   Result time 12/25/18 06:58:03   Final result by Emiliano Nj MD (12/25/18 06:58:03)                Impression:    No acute abnormality of the cervical spine.             Narrative:    EXAMINATION:  CT OF THE CERVICAL SPINE WITHOUT CONTRAST 12/25/2018 6:41 am    TECHNIQUE:  CT of the cervical spine was performed without the administration of  intravenous contrast. Multiplanar reformatted images are provided for review. Dose modulation, iterative reconstruction, and/or weight based adjustment of  the mA/kV was utilized to reduce the radiation dose to as low as reasonably  achievable. COMPARISON:  None. HISTORY:  ORDERING SYSTEM PROVIDED HISTORY: fall  TECHNOLOGIST PROVIDED HISTORY:  Ordering Physician Provided Reason for Exam: fall , syncope, dizzy, hit lt  side head,, afib  Acuity: Acute  Type of Exam: Initial  Mechanism of Injury: fall  Relevant Medical/Surgical History: no sx    FINDINGS:  BONES/ALIGNMENT: There is no evidence of an acute cervical spine fracture. There is normal alignment of the cervical spine.  The bones are osteopenic. DEGENERATIVE CHANGES: Moderate degenerative disease at C4-C5, C5-C6 and C6-C7. SOFT TISSUES: There is no prevertebral soft tissue swelling.                    CT Head WO Contrast (Final result)   Result time 12/25/18 06:55:35   Final result by Kalpana Clark MD (12/25/18 06:55:35)                Impression:    No acute intracranial abnormality. Narrative:    EXAMINATION:  CT OF THE HEAD WITHOUT CONTRAST  12/25/2018 6:41 am    TECHNIQUE:  CT of the head was performed without the administration of intravenous  contrast. Dose modulation, iterative reconstruction, and/or weight based  adjustment of the mA/kV was utilized to reduce the radiation dose to as low  as reasonably achievable. COMPARISON:  09/30/2009    HISTORY:  ORDERING SYSTEM PROVIDED HISTORY: fall  TECHNOLOGIST PROVIDED HISTORY:  Has a \"code stroke\" or \"stroke alert\" been called? ->No  Ordering Physician Provided Reason for Exam: fall, dizzy, hit head lt side ,  ? loc,, afib  Acuity: Acute  Type of Exam: Initial  Mechanism of Injury: syncope  Relevant Medical/Surgical History: no sx    FINDINGS:  BRAIN/VENTRICLES: There is no acute intracranial hemorrhage, mass effect or  midline shift.  No abnormal extra-axial fluid collection.  The gray-white  differentiation is maintained without evidence of an acute infarct. There is  prominence of the ventricles and sulci due to global parenchymal volume loss. There are nonspecific areas of hypoattenuation within the periventricular and  subcortical white matter, which likely represent chronic microvascular  ischemic change.  Stable focal encephalomalacia in the left frontal lobe. ORBITS: The visualized portion of the orbits demonstrate no acute abnormality. SINUSES: The visualized paranasal sinuses and mastoid air cells demonstrate  no acute abnormality. SOFT TISSUES/SKULL: No acute abnormality of the visualized skull or soft  tissues.                    XR CHEST PORTABLE (Final result)   Result time 12/25/18 06:50:16   Final result by Lawrence Jasmine MD (12/25/18 06:50:16)                Impression:    No acute cardiopulmonary disease. Narrative:    EXAMINATION:  SINGLE XRAY VIEW OF THE CHEST    12/25/2018 6:38 am    COMPARISON:  02/14/2008    HISTORY:  ORDERING SYSTEM PROVIDED HISTORY: sob  TECHNOLOGIST PROVIDED HISTORY:  Reason for exam:->sob  Ordering Physician Provided Reason for Exam: sob  Acuity: Acute  Type of Exam: Initial    FINDINGS:  Monitor wires project over the thorax. Lungs are clear.  No pleural effusion or pneumothorax.  Normal  cardiomediastinal silhouette and pulmonary vascularity.  No acute osseous  abnormality.                          ED COURSE & MEDICAL DECISION MAKING       Vital signs and nursing notes reviewed during ED course. Patient care and presentation staffed with supervising MD.  Dr. Ratliff Wendell Patient seen by supervising MD today. All pertinent Lab data and radiographic results reviewed with patient at bedside. The patient and/or the family were informed of the results of any tests/labs/imaging, the treatment plan, and time was allotted to answer questions. Pt presents as above. Vitals today show the pt is afebrile. 100% on room air.

## 2018-12-26 LAB
EKG ATRIAL RATE: 67 BPM
EKG DIAGNOSIS: NORMAL
EKG P AXIS: 68 DEGREES
EKG P-R INTERVAL: 206 MS
EKG Q-T INTERVAL: 454 MS
EKG QRS DURATION: 102 MS
EKG QTC CALCULATION (BAZETT): 479 MS
EKG R AXIS: 37 DEGREES
EKG T AXIS: 60 DEGREES
EKG VENTRICULAR RATE: 67 BPM

## 2019-01-07 ENCOUNTER — OFFICE VISIT (OUTPATIENT)
Dept: INTERNAL MEDICINE CLINIC | Age: 79
End: 2019-01-07
Payer: MEDICARE

## 2019-01-07 VITALS
HEART RATE: 74 BPM | SYSTOLIC BLOOD PRESSURE: 122 MMHG | DIASTOLIC BLOOD PRESSURE: 68 MMHG | OXYGEN SATURATION: 97 % | RESPIRATION RATE: 18 BRPM

## 2019-01-07 DIAGNOSIS — M54.32 SCIATICA OF LEFT SIDE: ICD-10-CM

## 2019-01-07 DIAGNOSIS — I48.0 PAROXYSMAL ATRIAL FIBRILLATION (HCC): Primary | ICD-10-CM

## 2019-01-07 DIAGNOSIS — R55 SYNCOPE, UNSPECIFIED SYNCOPE TYPE: ICD-10-CM

## 2019-01-07 DIAGNOSIS — M51.36 DDD (DEGENERATIVE DISC DISEASE), LUMBAR: ICD-10-CM

## 2019-01-07 DIAGNOSIS — E03.4 HYPOTHYROIDISM DUE TO ACQUIRED ATROPHY OF THYROID: ICD-10-CM

## 2019-01-07 PROCEDURE — G8419 CALC BMI OUT NRM PARAM NOF/U: HCPCS | Performed by: INTERNAL MEDICINE

## 2019-01-07 PROCEDURE — G8484 FLU IMMUNIZE NO ADMIN: HCPCS | Performed by: INTERNAL MEDICINE

## 2019-01-07 PROCEDURE — 1090F PRES/ABSN URINE INCON ASSESS: CPT | Performed by: INTERNAL MEDICINE

## 2019-01-07 PROCEDURE — 1036F TOBACCO NON-USER: CPT | Performed by: INTERNAL MEDICINE

## 2019-01-07 PROCEDURE — 99214 OFFICE O/P EST MOD 30 MIN: CPT | Performed by: INTERNAL MEDICINE

## 2019-01-07 PROCEDURE — 1123F ACP DISCUSS/DSCN MKR DOCD: CPT | Performed by: INTERNAL MEDICINE

## 2019-01-07 PROCEDURE — G8427 DOCREV CUR MEDS BY ELIG CLIN: HCPCS | Performed by: INTERNAL MEDICINE

## 2019-01-07 PROCEDURE — 1101F PT FALLS ASSESS-DOCD LE1/YR: CPT | Performed by: INTERNAL MEDICINE

## 2019-01-07 PROCEDURE — G8399 PT W/DXA RESULTS DOCUMENT: HCPCS | Performed by: INTERNAL MEDICINE

## 2019-01-07 PROCEDURE — 4040F PNEUMOC VAC/ADMIN/RCVD: CPT | Performed by: INTERNAL MEDICINE

## 2019-01-07 RX ORDER — PREDNISONE 1 MG/1
TABLET ORAL
Qty: 36 TABLET | Refills: 0 | Status: SHIPPED | OUTPATIENT
Start: 2019-01-07 | End: 2019-02-21 | Stop reason: ALTCHOICE

## 2019-02-19 DIAGNOSIS — E53.8 B12 DEFICIENCY: ICD-10-CM

## 2019-02-19 DIAGNOSIS — I48.0 PAROXYSMAL ATRIAL FIBRILLATION (HCC): ICD-10-CM

## 2019-02-19 DIAGNOSIS — E03.4 HYPOTHYROIDISM DUE TO ACQUIRED ATROPHY OF THYROID: ICD-10-CM

## 2019-02-19 DIAGNOSIS — E78.2 MIXED HYPERLIPIDEMIA: ICD-10-CM

## 2019-02-19 LAB
A/G RATIO: 1.7 (ref 1.1–2.2)
ALBUMIN SERPL-MCNC: 4.3 G/DL (ref 3.4–5)
ALP BLD-CCNC: 93 U/L (ref 40–129)
ALT SERPL-CCNC: 12 U/L (ref 10–40)
ANION GAP SERPL CALCULATED.3IONS-SCNC: 11 MMOL/L (ref 3–16)
AST SERPL-CCNC: 18 U/L (ref 15–37)
BASOPHILS ABSOLUTE: 0 K/UL (ref 0–0.2)
BASOPHILS RELATIVE PERCENT: 0.4 %
BILIRUB SERPL-MCNC: 0.6 MG/DL (ref 0–1)
BUN BLDV-MCNC: 17 MG/DL (ref 7–20)
CALCIUM SERPL-MCNC: 9.1 MG/DL (ref 8.3–10.6)
CHLORIDE BLD-SCNC: 103 MMOL/L (ref 99–110)
CHOLESTEROL, TOTAL: 162 MG/DL (ref 0–199)
CO2: 27 MMOL/L (ref 21–32)
CREAT SERPL-MCNC: 0.7 MG/DL (ref 0.6–1.2)
EOSINOPHILS ABSOLUTE: 0 K/UL (ref 0–0.6)
EOSINOPHILS RELATIVE PERCENT: 0.1 %
GFR AFRICAN AMERICAN: >60
GFR NON-AFRICAN AMERICAN: >60
GLOBULIN: 2.5 G/DL
GLUCOSE BLD-MCNC: 86 MG/DL (ref 70–99)
HCT VFR BLD CALC: 41.3 % (ref 36–48)
HDLC SERPL-MCNC: 48 MG/DL (ref 40–60)
HEMOGLOBIN: 13.8 G/DL (ref 12–16)
LDL CHOLESTEROL CALCULATED: 95 MG/DL
LYMPHOCYTES ABSOLUTE: 1.4 K/UL (ref 1–5.1)
LYMPHOCYTES RELATIVE PERCENT: 28.3 %
MCH RBC QN AUTO: 30.4 PG (ref 26–34)
MCHC RBC AUTO-ENTMCNC: 33.3 G/DL (ref 31–36)
MCV RBC AUTO: 91.3 FL (ref 80–100)
MONOCYTES ABSOLUTE: 0.5 K/UL (ref 0–1.3)
MONOCYTES RELATIVE PERCENT: 9.6 %
NEUTROPHILS ABSOLUTE: 3.1 K/UL (ref 1.7–7.7)
NEUTROPHILS RELATIVE PERCENT: 61.6 %
PDW BLD-RTO: 13.3 % (ref 12.4–15.4)
PLATELET # BLD: 195 K/UL (ref 135–450)
PMV BLD AUTO: 7.9 FL (ref 5–10.5)
POTASSIUM SERPL-SCNC: 4.6 MMOL/L (ref 3.5–5.1)
RBC # BLD: 4.53 M/UL (ref 4–5.2)
SODIUM BLD-SCNC: 141 MMOL/L (ref 136–145)
T4 FREE: 1.7 NG/DL (ref 0.9–1.8)
TOTAL PROTEIN: 6.8 G/DL (ref 6.4–8.2)
TRIGL SERPL-MCNC: 94 MG/DL (ref 0–150)
TSH REFLEX: 0.6 UIU/ML (ref 0.27–4.2)
VITAMIN B-12: 422 PG/ML (ref 211–911)
VLDLC SERPL CALC-MCNC: 19 MG/DL
WBC # BLD: 5 K/UL (ref 4–11)

## 2019-02-21 ENCOUNTER — HOSPITAL ENCOUNTER (OUTPATIENT)
Dept: GENERAL RADIOLOGY | Age: 79
Discharge: HOME | End: 2019-02-21
Payer: MEDICARE

## 2019-02-21 ENCOUNTER — HOSPITAL ENCOUNTER (OUTPATIENT)
Age: 79
Discharge: HOME | End: 2019-02-21
Payer: MEDICARE

## 2019-02-21 ENCOUNTER — OFFICE VISIT (OUTPATIENT)
Dept: INTERNAL MEDICINE CLINIC | Age: 79
End: 2019-02-21
Payer: MEDICARE

## 2019-02-21 VITALS
HEART RATE: 60 BPM | SYSTOLIC BLOOD PRESSURE: 112 MMHG | WEIGHT: 151 LBS | RESPIRATION RATE: 14 BRPM | BODY MASS INDEX: 25.92 KG/M2 | DIASTOLIC BLOOD PRESSURE: 70 MMHG | OXYGEN SATURATION: 97 %

## 2019-02-21 DIAGNOSIS — I48.0 PAROXYSMAL ATRIAL FIBRILLATION (HCC): ICD-10-CM

## 2019-02-21 DIAGNOSIS — E03.4 HYPOTHYROIDISM DUE TO ACQUIRED ATROPHY OF THYROID: ICD-10-CM

## 2019-02-21 DIAGNOSIS — M25.561 ACUTE PAIN OF RIGHT KNEE: ICD-10-CM

## 2019-02-21 DIAGNOSIS — E78.2 MIXED HYPERLIPIDEMIA: ICD-10-CM

## 2019-02-21 DIAGNOSIS — E53.8 B12 DEFICIENCY: ICD-10-CM

## 2019-02-21 DIAGNOSIS — M25.561 ACUTE PAIN OF RIGHT KNEE: Primary | ICD-10-CM

## 2019-02-21 DIAGNOSIS — E55.9 VITAMIN D DEFICIENCY: ICD-10-CM

## 2019-02-21 PROCEDURE — 1090F PRES/ABSN URINE INCON ASSESS: CPT | Performed by: INTERNAL MEDICINE

## 2019-02-21 PROCEDURE — G8419 CALC BMI OUT NRM PARAM NOF/U: HCPCS | Performed by: INTERNAL MEDICINE

## 2019-02-21 PROCEDURE — 1101F PT FALLS ASSESS-DOCD LE1/YR: CPT | Performed by: INTERNAL MEDICINE

## 2019-02-21 PROCEDURE — G8484 FLU IMMUNIZE NO ADMIN: HCPCS | Performed by: INTERNAL MEDICINE

## 2019-02-21 PROCEDURE — 99214 OFFICE O/P EST MOD 30 MIN: CPT | Performed by: INTERNAL MEDICINE

## 2019-02-21 PROCEDURE — 1036F TOBACCO NON-USER: CPT | Performed by: INTERNAL MEDICINE

## 2019-02-21 PROCEDURE — 4040F PNEUMOC VAC/ADMIN/RCVD: CPT | Performed by: INTERNAL MEDICINE

## 2019-02-21 PROCEDURE — 73560 X-RAY EXAM OF KNEE 1 OR 2: CPT

## 2019-02-21 PROCEDURE — G8427 DOCREV CUR MEDS BY ELIG CLIN: HCPCS | Performed by: INTERNAL MEDICINE

## 2019-02-21 PROCEDURE — G8399 PT W/DXA RESULTS DOCUMENT: HCPCS | Performed by: INTERNAL MEDICINE

## 2019-02-21 PROCEDURE — 1123F ACP DISCUSS/DSCN MKR DOCD: CPT | Performed by: INTERNAL MEDICINE

## 2019-02-21 RX ORDER — MELOXICAM 7.5 MG/1
7.5 TABLET ORAL DAILY
Qty: 30 TABLET | Refills: 0 | Status: SHIPPED | OUTPATIENT
Start: 2019-02-21 | End: 2019-05-02

## 2019-02-21 RX ORDER — FLUTICASONE PROPIONATE 50 MCG
2 SPRAY, SUSPENSION (ML) NASAL DAILY
Qty: 1 BOTTLE | Refills: 3 | Status: SHIPPED | OUTPATIENT
Start: 2019-02-21 | End: 2021-03-15

## 2019-02-21 RX ORDER — PREDNISONE 1 MG/1
TABLET ORAL
Qty: 21 TABLET | Refills: 0 | Status: SHIPPED | OUTPATIENT
Start: 2019-02-21 | End: 2019-05-02

## 2019-02-21 RX ORDER — PROPAFENONE HYDROCHLORIDE 150 MG/1
150 TABLET, FILM COATED ORAL EVERY 8 HOURS
Qty: 90 TABLET | Refills: 5 | Status: SHIPPED | OUTPATIENT
Start: 2019-02-21 | End: 2019-10-21 | Stop reason: SDUPTHER

## 2019-02-21 RX ORDER — LEVOTHYROXINE SODIUM 137 UG/1
137 TABLET ORAL DAILY
Qty: 90 TABLET | Refills: 1 | Status: SHIPPED | OUTPATIENT
Start: 2019-02-21 | End: 2019-08-22

## 2019-02-21 ASSESSMENT — PATIENT HEALTH QUESTIONNAIRE - PHQ9
1. LITTLE INTEREST OR PLEASURE IN DOING THINGS: 0
SUM OF ALL RESPONSES TO PHQ QUESTIONS 1-9: 0
SUM OF ALL RESPONSES TO PHQ QUESTIONS 1-9: 0
2. FEELING DOWN, DEPRESSED OR HOPELESS: 0
SUM OF ALL RESPONSES TO PHQ9 QUESTIONS 1 & 2: 0

## 2019-05-02 ENCOUNTER — OFFICE VISIT (OUTPATIENT)
Dept: FAMILY MEDICINE CLINIC | Age: 79
End: 2019-05-02
Payer: MEDICARE

## 2019-05-02 VITALS
BODY MASS INDEX: 26.23 KG/M2 | SYSTOLIC BLOOD PRESSURE: 120 MMHG | HEART RATE: 77 BPM | WEIGHT: 152.8 LBS | OXYGEN SATURATION: 94 % | TEMPERATURE: 98.7 F | DIASTOLIC BLOOD PRESSURE: 68 MMHG

## 2019-05-02 DIAGNOSIS — R05.8 COUGH WITH SPUTUM: ICD-10-CM

## 2019-05-02 DIAGNOSIS — J01.00 ACUTE NON-RECURRENT MAXILLARY SINUSITIS: Primary | ICD-10-CM

## 2019-05-02 PROCEDURE — G8399 PT W/DXA RESULTS DOCUMENT: HCPCS | Performed by: NURSE PRACTITIONER

## 2019-05-02 PROCEDURE — G8419 CALC BMI OUT NRM PARAM NOF/U: HCPCS | Performed by: NURSE PRACTITIONER

## 2019-05-02 PROCEDURE — 1090F PRES/ABSN URINE INCON ASSESS: CPT | Performed by: NURSE PRACTITIONER

## 2019-05-02 PROCEDURE — 1123F ACP DISCUSS/DSCN MKR DOCD: CPT | Performed by: NURSE PRACTITIONER

## 2019-05-02 PROCEDURE — 1036F TOBACCO NON-USER: CPT | Performed by: NURSE PRACTITIONER

## 2019-05-02 PROCEDURE — 4040F PNEUMOC VAC/ADMIN/RCVD: CPT | Performed by: NURSE PRACTITIONER

## 2019-05-02 PROCEDURE — G8427 DOCREV CUR MEDS BY ELIG CLIN: HCPCS | Performed by: NURSE PRACTITIONER

## 2019-05-02 PROCEDURE — 99213 OFFICE O/P EST LOW 20 MIN: CPT | Performed by: NURSE PRACTITIONER

## 2019-05-02 RX ORDER — AMOXICILLIN AND CLAVULANATE POTASSIUM 875; 125 MG/1; MG/1
1 TABLET, FILM COATED ORAL 2 TIMES DAILY
Qty: 14 TABLET | Refills: 0 | Status: SHIPPED | OUTPATIENT
Start: 2019-05-02 | End: 2019-05-09

## 2019-05-02 RX ORDER — BENZONATATE 100 MG/1
100 CAPSULE ORAL 3 TIMES DAILY PRN
Qty: 30 CAPSULE | Refills: 0 | Status: SHIPPED | OUTPATIENT
Start: 2019-05-02 | End: 2019-05-09

## 2019-05-02 ASSESSMENT — ENCOUNTER SYMPTOMS
TROUBLE SWALLOWING: 0
WHEEZING: 0
SHORTNESS OF BREATH: 0
SORE THROAT: 0
CHEST TIGHTNESS: 0
SINUS PAIN: 0
SINUS PRESSURE: 1
NAUSEA: 0
COUGH: 1

## 2019-05-02 NOTE — PROGRESS NOTES
Juan Miguel Hayden  1940  [de-identified] y.o. SUBJECT RONNIE:    Chief Complaint   Patient presents with    Chest Congestion     symptoms x Saturday    Nasal Congestion       [facility] is an [de-identified]year old female who is in with nasal congestion, cough, fatigue, headache, and sinus pressure. She states she has some yellow sputum that just started. She has used Mucinex, Flonase and Vicks Inhaler with slight relief. She states she has a family gathering this weekend and wants to feel better. She denies fevers, chills or sweats. Current Outpatient Medications on File Prior to Visit   Medication Sig Dispense Refill    fluticasone (FLONASE) 50 MCG/ACT nasal spray 2 sprays by Nasal route daily 1 Bottle 3    levothyroxine (SYNTHROID) 137 MCG tablet Take 1 tablet by mouth daily Dispense as written, BRAND NAME ONLY 90 tablet 1    propafenone (RYTHMOL) 150 MG tablet Take 1 tablet by mouth every 8 hours 90 tablet 5    Cyanocobalamin (B-12) 1000 MCG CAPS Take by mouth      Cholecalciferol (VITAMIN D3) 1000 UNITS TABS Take 1 tablet by mouth daily. 30 tablet 0     No current facility-administered medications on file prior to visit. Past Medical History:   Diagnosis Date    AF (atrial fibrillation) (HCC)     Allergic rhinitis due to other allergen     Anxiety     Arthritis     Arthritis of big toe 6/12    Dr Zonia Luna- bilateral, treating w orthotics    B12 deficiency     BCC (basal cell carcinoma), face 8/13/13    L cheek- Dr Harley Sol tunnel syndrome     Congenital prolapse of bladder mucosa     DDD (degenerative disc disease), lumbar     Female incontinence     H/O cardiovascular stress test 1/14/2010, 2/6/2007 1/14/2010-Normal perfusion. Normal LVSF. EF 70%;    H/O echocardiogram 1/7/2010, 2/6/2007 1/7/2010-Mild concentric LVH. LVSF normal. EF =>55%.     History of Doppler ultrasound     8/19/2009-Renal US- Normal    Homocysteinemia ([address]     HX OTHER MEDICAL     8/4/2010-48 HR HOLTER MONITOR-Pred retired     Comment: former teacher   Social Needs    Financial resource strain: Not on file    Food insecurity:     Worry: Not on file     Inability: Not on file   [employer] needs:     Medical: Not on file     Non-medical: Not on file   Tobacco Use    Smoking status: Never Smoker    Smokeless tobacco: Never Used   Substance and Sexual Activity    Alcohol use: Yes     Comment: occassional wine    Drug use: No    Sexual activity: Yes     Partners: Male     Comment:    Lifestyle    Physical activity:     Days per week: Not on file     Minutes per session: Not on file    Stress: Not on file   Relationships    Social connections:     Talks on phone: Not on file     Gets together: Not on file     Attends Voodoo service: Not on file     Active member of club or organization: Not on file     Attends meetings of clubs or organizations: Not on file     Relationship status: Not on file    Intimate partner violence:     Fear of current or ex partner: Not on file     Emotionally abused: Not on file     Physically abused: Not on file     Forced sexual activity: Not on file   Other Topics Concern    Not on file   Social History Narrative    Not on file       Review of Systems   Constitutional: Positive for appetite change (not hungry). Negative for activity change, chills, diaphoresis, fatigue, fever and unexpected weight change. HENT: Positive for sinus pressure. Negative for sinus pain, sore throat and trouble swallowing. Respiratory: Positive for cough. Negative for chest tightness, shortness of breath and wheezing. Cardiovascular: Negative for chest pain and palpitations. Gastrointestinal: Negative for abdominal pain and nausea. Neurological: Negative for dizziness and headaches. Psychiatric/Behavioral: Positive for behavioral problems.        OBJECTIVE:     /68   Pulse 77   Temp 98.7 °F (37.1 °C) (Oral)   Wt 152 lb 12.8 oz (69.3 kg)   SpO2 94%   BMI 26.23 kg/m² Physical Exam   Constitutional: She is oriented to person, place, and time. She appears well-developed and well-nourished. No distress. HENT:   Head: Normocephalic and atraumatic. Right Ear: External ear normal.   Left Ear: External ear normal.   Nose: Nose normal.   Mouth/Throat: Oropharynx is clear and moist.   Eyes: Pupils are equal, round, and reactive to light. Conjunctivae and EOM are normal.   Neck: Normal range of motion. Neck supple. Cardiovascular: Normal rate, regular rhythm and normal heart sounds. Pulmonary/Chest: Effort normal and breath sounds normal.   Musculoskeletal: Normal range of motion. Neurological: She is alert and oriented to person, place, and time. Skin: Skin is warm and dry. She is not diaphoretic. Psychiatric: She has a normal mood and affect. Her behavior is normal. Judgment and thought content normal.   Vitals reviewed. No results found for requested labs within last 30 days. LDL Calculated (mg/dL)   Date Value   02/19/2019 95       Lab Results   Component Value Date    WBC 5.0 02/19/2019    WBC 5.2 12/25/2018    WBC 3.8 08/17/2018    NEUTROABS 3.1 02/19/2019    NEUTROABS 1.7 08/17/2018    NEUTROABS 1.9 08/16/2017    HGB 13.8 02/19/2019    HGB 13.7 12/25/2018    HGB 14.6 08/17/2018    HCT 41.3 02/19/2019    HCT 42.0 12/25/2018    HCT 43.3 08/17/2018    MCV 91.3 02/19/2019    MCV 93.5 12/25/2018    MCV 91.5 08/17/2018     02/19/2019     12/25/2018     08/17/2018    SEGSABS 3.0 12/25/2018    LYMPHSABS 1.4 02/19/2019    MONOSABS 0.5 02/19/2019    EOSABS 0.0 02/19/2019    BASOSABS 0.0 02/19/2019     Lab Results   Component Value Date    TSH 2.60 02/19/2018    TSHHS 3.360 12/25/2018     Lab Results   Component Value Date    LABALBU 4.3 02/19/2019    BILITOT 0.6 02/19/2019    AST 18 02/19/2019    ALT 12 02/19/2019    ALKPHOS 93 02/19/2019             No results found for this visit on 05/02/19. ASSESSMENT AND PLAN:     1.  Acute non-recurrent maxillary sinusitis  - amoxicillin-clavulanate (AUGMENTIN) 875-125 MG per tablet; Take 1 tablet by mouth 2 times daily for 7 days  Dispense: 14 tablet; Refill: 0    2. Cough with sputum  - amoxicillin-clavulanate (AUGMENTIN) 875-125 MG per tablet; Take 1 tablet by mouth 2 times daily for 7 days  Dispense: 14 tablet; Refill: 0  - benzonatate (TESSALON) 100 MG capsule; Take 1 capsule by mouth 3 times daily as needed for Cough  Dispense: 30 capsule; Refill: 0    Increase fluids, rest  Take prescribed medications as directed  Finish up Mucinex (3 days)  Warm salt gargles for throat discomfort  1 tsp honey of honey every morning for throat discomfort  Return to clinic or contact PCP if no improvement in 5 to 7 days  Verbalized understanding and agreement with plan  Return if symptoms worsen or fail to improve. Care discussed with patient. Patient educated on signs and symptoms of exacerbation and when to seek further medical attention. Advised to call for any problems, questions, or concerns. Patient verbalizes understanding and agrees with plan. Medications reviewed and reconciled. Continue current medications. Appropriate prescriptions are ordered. Risks and benefits of meds are discussed. After visit summary provided.

## 2019-05-04 ASSESSMENT — ENCOUNTER SYMPTOMS: ABDOMINAL PAIN: 0

## 2019-08-20 ENCOUNTER — TELEPHONE (OUTPATIENT)
Dept: INTERNAL MEDICINE CLINIC | Age: 79
End: 2019-08-20

## 2019-08-20 DIAGNOSIS — E53.8 B12 DEFICIENCY: ICD-10-CM

## 2019-08-20 DIAGNOSIS — I48.0 PAROXYSMAL ATRIAL FIBRILLATION (HCC): ICD-10-CM

## 2019-08-20 DIAGNOSIS — Z12.31 VISIT FOR SCREENING MAMMOGRAM: Primary | ICD-10-CM

## 2019-08-20 DIAGNOSIS — E03.4 HYPOTHYROIDISM DUE TO ACQUIRED ATROPHY OF THYROID: ICD-10-CM

## 2019-08-20 DIAGNOSIS — E55.9 VITAMIN D DEFICIENCY: ICD-10-CM

## 2019-08-20 DIAGNOSIS — E78.2 MIXED HYPERLIPIDEMIA: ICD-10-CM

## 2019-08-20 LAB
A/G RATIO: 1.7 (ref 1.1–2.2)
ALBUMIN SERPL-MCNC: 4.8 G/DL (ref 3.4–5)
ALP BLD-CCNC: 79 U/L (ref 40–129)
ALT SERPL-CCNC: 14 U/L (ref 10–40)
ANION GAP SERPL CALCULATED.3IONS-SCNC: 14 MMOL/L (ref 3–16)
AST SERPL-CCNC: 19 U/L (ref 15–37)
BASOPHILS ABSOLUTE: 0 K/UL (ref 0–0.2)
BASOPHILS RELATIVE PERCENT: 0.9 %
BILIRUB SERPL-MCNC: 0.5 MG/DL (ref 0–1)
BUN BLDV-MCNC: 20 MG/DL (ref 7–20)
CALCIUM SERPL-MCNC: 9.6 MG/DL (ref 8.3–10.6)
CHLORIDE BLD-SCNC: 99 MMOL/L (ref 99–110)
CHOLESTEROL, TOTAL: 205 MG/DL (ref 0–199)
CO2: 26 MMOL/L (ref 21–32)
CREAT SERPL-MCNC: 0.9 MG/DL (ref 0.6–1.2)
EOSINOPHILS ABSOLUTE: 0 K/UL (ref 0–0.6)
EOSINOPHILS RELATIVE PERCENT: 0.1 %
GFR AFRICAN AMERICAN: >60
GFR NON-AFRICAN AMERICAN: >60
GLOBULIN: 2.9 G/DL
GLUCOSE BLD-MCNC: 90 MG/DL (ref 70–99)
HCT VFR BLD CALC: 42.6 % (ref 36–48)
HDLC SERPL-MCNC: 60 MG/DL (ref 40–60)
HEMOGLOBIN: 14.5 G/DL (ref 12–16)
LDL CHOLESTEROL CALCULATED: 125 MG/DL
LYMPHOCYTES ABSOLUTE: 1.5 K/UL (ref 1–5.1)
LYMPHOCYTES RELATIVE PERCENT: 40 %
MCH RBC QN AUTO: 31.6 PG (ref 26–34)
MCHC RBC AUTO-ENTMCNC: 34 G/DL (ref 31–36)
MCV RBC AUTO: 92.9 FL (ref 80–100)
MONOCYTES ABSOLUTE: 0.4 K/UL (ref 0–1.3)
MONOCYTES RELATIVE PERCENT: 10.8 %
NEUTROPHILS ABSOLUTE: 1.8 K/UL (ref 1.7–7.7)
NEUTROPHILS RELATIVE PERCENT: 48.2 %
PDW BLD-RTO: 13.4 % (ref 12.4–15.4)
PLATELET # BLD: 170 K/UL (ref 135–450)
PMV BLD AUTO: 8.3 FL (ref 5–10.5)
POTASSIUM SERPL-SCNC: 4.7 MMOL/L (ref 3.5–5.1)
RBC # BLD: 4.59 M/UL (ref 4–5.2)
SODIUM BLD-SCNC: 139 MMOL/L (ref 136–145)
T4 FREE: 1.3 NG/DL (ref 0.9–1.8)
TOTAL PROTEIN: 7.7 G/DL (ref 6.4–8.2)
TRIGL SERPL-MCNC: 101 MG/DL (ref 0–150)
TSH SERPL DL<=0.05 MIU/L-ACNC: 7.47 UIU/ML (ref 0.27–4.2)
VITAMIN B-12: 569 PG/ML (ref 211–911)
VITAMIN D 25-HYDROXY: 43 NG/ML
VLDLC SERPL CALC-MCNC: 20 MG/DL
WBC # BLD: 3.8 K/UL (ref 4–11)

## 2019-08-21 ENCOUNTER — HOSPITAL ENCOUNTER (OUTPATIENT)
Dept: WOMENS IMAGING | Age: 79
Discharge: HOME | End: 2019-08-21
Payer: MEDICARE

## 2019-08-21 DIAGNOSIS — Z12.31 SCREENING MAMMOGRAM, ENCOUNTER FOR: ICD-10-CM

## 2019-08-21 PROCEDURE — 77067 SCR MAMMO BI INCL CAD: CPT

## 2019-08-22 ENCOUNTER — OFFICE VISIT (OUTPATIENT)
Dept: INTERNAL MEDICINE CLINIC | Age: 79
End: 2019-08-22
Payer: MEDICARE

## 2019-08-22 VITALS
HEIGHT: 65 IN | BODY MASS INDEX: 25.16 KG/M2 | OXYGEN SATURATION: 97 % | RESPIRATION RATE: 14 BRPM | DIASTOLIC BLOOD PRESSURE: 82 MMHG | WEIGHT: 151 LBS | SYSTOLIC BLOOD PRESSURE: 119 MMHG | HEART RATE: 62 BPM

## 2019-08-22 DIAGNOSIS — E53.8 B12 DEFICIENCY: ICD-10-CM

## 2019-08-22 DIAGNOSIS — E55.9 VITAMIN D DEFICIENCY: ICD-10-CM

## 2019-08-22 DIAGNOSIS — E78.2 MIXED HYPERLIPIDEMIA: ICD-10-CM

## 2019-08-22 DIAGNOSIS — Z00.00 ROUTINE GENERAL MEDICAL EXAMINATION AT A HEALTH CARE FACILITY: Primary | ICD-10-CM

## 2019-08-22 DIAGNOSIS — I48.0 PAROXYSMAL ATRIAL FIBRILLATION (HCC): ICD-10-CM

## 2019-08-22 DIAGNOSIS — E03.4 HYPOTHYROIDISM DUE TO ACQUIRED ATROPHY OF THYROID: ICD-10-CM

## 2019-08-22 DIAGNOSIS — M81.0 AGE-RELATED OSTEOPOROSIS WITHOUT CURRENT PATHOLOGICAL FRACTURE: ICD-10-CM

## 2019-08-22 PROCEDURE — G0439 PPPS, SUBSEQ VISIT: HCPCS | Performed by: INTERNAL MEDICINE

## 2019-08-22 PROCEDURE — 4040F PNEUMOC VAC/ADMIN/RCVD: CPT | Performed by: INTERNAL MEDICINE

## 2019-08-22 PROCEDURE — 1123F ACP DISCUSS/DSCN MKR DOCD: CPT | Performed by: INTERNAL MEDICINE

## 2019-08-22 RX ORDER — LEVOTHYROXINE SODIUM 137 UG/1
150 TABLET ORAL DAILY
Qty: 90 TABLET | Refills: 1 | Status: CANCELLED | OUTPATIENT
Start: 2019-08-22

## 2019-08-22 RX ORDER — LEVOTHYROXINE SODIUM 0.15 MG/1
150 TABLET ORAL DAILY
Qty: 30 TABLET | Refills: 5 | Status: SHIPPED | OUTPATIENT
Start: 2019-08-22 | End: 2019-08-23 | Stop reason: SDUPTHER

## 2019-08-22 ASSESSMENT — PATIENT HEALTH QUESTIONNAIRE - PHQ9
SUM OF ALL RESPONSES TO PHQ QUESTIONS 1-9: 0
SUM OF ALL RESPONSES TO PHQ QUESTIONS 1-9: 0

## 2019-08-22 ASSESSMENT — LIFESTYLE VARIABLES: HOW OFTEN DO YOU HAVE A DRINK CONTAINING ALCOHOL: 0

## 2019-08-22 NOTE — PROGRESS NOTES
organomegaly  Extremities: no cyanosis, clubbing or edema  Musculoskeletal: normal range of motion, no joint swelling, deformity or tenderness  Neurologic:   no cranial nerve deficit, gait, coordination and speech normal    Patient's complete Health Risk Assessment and screening values have been reviewed and are found in Flowsheets. The following problems were reviewed today and where indicated follow up appointments were made and/or referrals ordered. Positive Risk Factor Screenings with Interventions:     Depression:  Depression Unable to Assess: Functional capacity motivation limits accuracy  PHQ-2 Score: 0  PHQ-9 Total Score: 0    Personalized Preventive Plan   Current Health Maintenance Status  Immunization History   Administered Date(s) Administered    Pneumococcal Conjugate 13-valent (Hailey Skates) 08/21/2017        Health Maintenance   Topic Date Due    DTaP/Tdap/Td vaccine (1 - Tdap) 07/30/1957    Shingles Vaccine (1 of 2) 07/30/1988    Pneumococcal 65+ years Vaccine (2 of 2 - PPSV23) 08/21/2018    Annual Wellness Visit (AWV)  08/21/2019    Flu vaccine (1) 09/01/2019    TSH testing  08/20/2020    DEXA (modify frequency per FRAX score)  Completed     Recommendations for Preventive Services Due: see orders and patient instructions/AVS.  . Recommended screening schedule for the next 5-10 years is provided to the patient in written form: see Patient Barnet Heimlich was seen today for medicare awv. Diagnoses and all orders for this visit:    Routine general medical examination at a health care facility-remains independent, functional and active, no indications/needs for other interventions noted at this time- except as noted below and also findings noted on screening medicare questions.   FOR HER VAG PROLAPSE WE CALLED DIRECTLY TO DR CASPER'S OFFICE TO SEE IF HE CAN PERSONALLY F/U EVAL TO SEE IF NEEDS RETRIAL OF PESSARY- HAD PROBLEMS PREV W BLEEDING AFTER INITIAL TRIAL    Hypothyroidism due

## 2019-08-23 DIAGNOSIS — E03.4 HYPOTHYROIDISM DUE TO ACQUIRED ATROPHY OF THYROID: ICD-10-CM

## 2019-08-23 RX ORDER — LEVOTHYROXINE SODIUM 0.15 MG/1
150 TABLET ORAL DAILY
Qty: 30 TABLET | Refills: 5 | Status: SHIPPED | OUTPATIENT
Start: 2019-08-23 | End: 2019-08-26 | Stop reason: SDUPTHER

## 2019-08-26 ENCOUNTER — OFFICE VISIT (OUTPATIENT)
Dept: CARDIOLOGY CLINIC | Age: 79
End: 2019-08-26
Payer: MEDICARE

## 2019-08-26 VITALS
HEIGHT: 65 IN | WEIGHT: 151.4 LBS | BODY MASS INDEX: 25.22 KG/M2 | HEART RATE: 67 BPM | DIASTOLIC BLOOD PRESSURE: 66 MMHG | SYSTOLIC BLOOD PRESSURE: 110 MMHG

## 2019-08-26 DIAGNOSIS — E03.4 HYPOTHYROIDISM DUE TO ACQUIRED ATROPHY OF THYROID: ICD-10-CM

## 2019-08-26 DIAGNOSIS — I48.0 PAROXYSMAL ATRIAL FIBRILLATION (HCC): Primary | ICD-10-CM

## 2019-08-26 PROCEDURE — 99213 OFFICE O/P EST LOW 20 MIN: CPT | Performed by: INTERNAL MEDICINE

## 2019-08-26 PROCEDURE — 1036F TOBACCO NON-USER: CPT | Performed by: INTERNAL MEDICINE

## 2019-08-26 PROCEDURE — 4040F PNEUMOC VAC/ADMIN/RCVD: CPT | Performed by: INTERNAL MEDICINE

## 2019-08-26 PROCEDURE — 1090F PRES/ABSN URINE INCON ASSESS: CPT | Performed by: INTERNAL MEDICINE

## 2019-08-26 PROCEDURE — 93000 ELECTROCARDIOGRAM COMPLETE: CPT | Performed by: INTERNAL MEDICINE

## 2019-08-26 PROCEDURE — 1123F ACP DISCUSS/DSCN MKR DOCD: CPT | Performed by: INTERNAL MEDICINE

## 2019-08-26 PROCEDURE — G8399 PT W/DXA RESULTS DOCUMENT: HCPCS | Performed by: INTERNAL MEDICINE

## 2019-08-26 PROCEDURE — G8419 CALC BMI OUT NRM PARAM NOF/U: HCPCS | Performed by: INTERNAL MEDICINE

## 2019-08-26 PROCEDURE — G8427 DOCREV CUR MEDS BY ELIG CLIN: HCPCS | Performed by: INTERNAL MEDICINE

## 2019-08-26 RX ORDER — ASPIRIN 81 MG/1
81 TABLET, CHEWABLE ORAL DAILY
COMMUNITY
End: 2021-03-15 | Stop reason: ALTCHOICE

## 2019-08-26 RX ORDER — LEVOTHYROXINE SODIUM 0.15 MG/1
150 TABLET ORAL DAILY
Qty: 30 TABLET | Refills: 5 | Status: SHIPPED | OUTPATIENT
Start: 2019-08-26 | End: 2019-08-27 | Stop reason: SDUPTHER

## 2019-08-26 NOTE — PROGRESS NOTES
[id number] Score for Atrial Fibrillation Stroke Risk   Risk   Factors  Component Value   C CHF No 0   H HTN No 0   A2 Age >= 76 Yes,  (78 y.o.) 2   D DM No 0   S2 Prior Stroke/TIA No 0   V Vascular Disease No 0   A Age 74-69 No,  (78 y.o.) 0   Sc Sex female 1    [id number]  Score  3   Score last updated 4/90/64 6:09 PM    Click here for a link to the UpToDate guideline \"Atrial Fibrillation: Anticoagulation therapy to prevent embolization    Disclaimer: Risk Score calculation is dependent on accuracy of patient problem list and past encounter diagnosis.

## 2019-08-27 DIAGNOSIS — E03.4 HYPOTHYROIDISM DUE TO ACQUIRED ATROPHY OF THYROID: ICD-10-CM

## 2019-08-27 RX ORDER — LEVOTHYROXINE SODIUM 0.15 MG/1
150 TABLET ORAL DAILY
Qty: 90 TABLET | Refills: 1 | Status: SHIPPED | OUTPATIENT
Start: 2019-08-27 | End: 2020-02-24 | Stop reason: SDUPTHER

## 2019-09-23 DIAGNOSIS — I48.0 PAROXYSMAL ATRIAL FIBRILLATION (HCC): ICD-10-CM

## 2019-09-23 DIAGNOSIS — E03.4 HYPOTHYROIDISM DUE TO ACQUIRED ATROPHY OF THYROID: ICD-10-CM

## 2019-09-24 LAB
T4 FREE: 1.5 NG/DL (ref 0.9–1.8)
TSH SERPL DL<=0.05 MIU/L-ACNC: 2.75 UIU/ML (ref 0.27–4.2)

## 2019-09-30 ENCOUNTER — HOSPITAL ENCOUNTER (OUTPATIENT)
Age: 79
Discharge: HOME | End: 2019-09-30
Payer: MEDICARE

## 2019-09-30 ENCOUNTER — HOSPITAL ENCOUNTER (OUTPATIENT)
Dept: GENERAL RADIOLOGY | Age: 79
Discharge: HOME | End: 2019-09-30
Payer: MEDICARE

## 2019-09-30 ENCOUNTER — TELEPHONE (OUTPATIENT)
Dept: INTERNAL MEDICINE CLINIC | Age: 79
End: 2019-09-30

## 2019-09-30 DIAGNOSIS — R07.81 RIB PAIN ON RIGHT SIDE: Primary | ICD-10-CM

## 2019-09-30 DIAGNOSIS — R07.81 RIB PAIN ON RIGHT SIDE: ICD-10-CM

## 2019-09-30 PROCEDURE — 71100 X-RAY EXAM RIBS UNI 2 VIEWS: CPT

## 2019-10-01 DIAGNOSIS — S22.41XD CLOSED FRACTURE OF MULTIPLE RIBS OF RIGHT SIDE WITH ROUTINE HEALING, SUBSEQUENT ENCOUNTER: Primary | ICD-10-CM

## 2019-10-21 DIAGNOSIS — I48.0 PAROXYSMAL ATRIAL FIBRILLATION (HCC): ICD-10-CM

## 2019-10-21 RX ORDER — PROPAFENONE HYDROCHLORIDE 150 MG/1
150 TABLET, FILM COATED ORAL EVERY 8 HOURS
Qty: 270 TABLET | Refills: 5 | Status: SHIPPED | OUTPATIENT
Start: 2019-10-21 | End: 2020-12-04 | Stop reason: SDUPTHER

## 2019-12-16 ENCOUNTER — OFFICE VISIT (OUTPATIENT)
Dept: INTERNAL MEDICINE CLINIC | Age: 79
End: 2019-12-16
Payer: MEDICARE

## 2019-12-16 VITALS
DIASTOLIC BLOOD PRESSURE: 64 MMHG | SYSTOLIC BLOOD PRESSURE: 112 MMHG | TEMPERATURE: 98.2 F | RESPIRATION RATE: 18 BRPM | HEART RATE: 102 BPM | OXYGEN SATURATION: 94 %

## 2019-12-16 DIAGNOSIS — I48.0 PAROXYSMAL ATRIAL FIBRILLATION (HCC): ICD-10-CM

## 2019-12-16 DIAGNOSIS — J01.00 ACUTE NON-RECURRENT MAXILLARY SINUSITIS: Primary | ICD-10-CM

## 2019-12-16 PROCEDURE — G8399 PT W/DXA RESULTS DOCUMENT: HCPCS | Performed by: INTERNAL MEDICINE

## 2019-12-16 PROCEDURE — 1036F TOBACCO NON-USER: CPT | Performed by: INTERNAL MEDICINE

## 2019-12-16 PROCEDURE — G8427 DOCREV CUR MEDS BY ELIG CLIN: HCPCS | Performed by: INTERNAL MEDICINE

## 2019-12-16 PROCEDURE — G8417 CALC BMI ABV UP PARAM F/U: HCPCS | Performed by: INTERNAL MEDICINE

## 2019-12-16 PROCEDURE — G8484 FLU IMMUNIZE NO ADMIN: HCPCS | Performed by: INTERNAL MEDICINE

## 2019-12-16 PROCEDURE — 99213 OFFICE O/P EST LOW 20 MIN: CPT | Performed by: INTERNAL MEDICINE

## 2019-12-16 PROCEDURE — 1090F PRES/ABSN URINE INCON ASSESS: CPT | Performed by: INTERNAL MEDICINE

## 2019-12-16 PROCEDURE — 1123F ACP DISCUSS/DSCN MKR DOCD: CPT | Performed by: INTERNAL MEDICINE

## 2019-12-16 PROCEDURE — 4040F PNEUMOC VAC/ADMIN/RCVD: CPT | Performed by: INTERNAL MEDICINE

## 2019-12-16 RX ORDER — DOXYCYCLINE HYCLATE 100 MG
100 TABLET ORAL 2 TIMES DAILY
Qty: 14 TABLET | Refills: 0 | Status: SHIPPED | OUTPATIENT
Start: 2019-12-16 | End: 2019-12-23

## 2019-12-16 RX ORDER — PREDNISONE 1 MG/1
TABLET ORAL
Qty: 21 TABLET | Refills: 0 | Status: SHIPPED | OUTPATIENT
Start: 2019-12-16 | End: 2020-02-24 | Stop reason: ALTCHOICE

## 2020-02-20 LAB
A/G RATIO: 2 (ref 1.1–2.2)
ALBUMIN SERPL-MCNC: 4.6 G/DL (ref 3.4–5)
ALP BLD-CCNC: 80 U/L (ref 40–129)
ALT SERPL-CCNC: 17 U/L (ref 10–40)
ANION GAP SERPL CALCULATED.3IONS-SCNC: 11 MMOL/L (ref 3–16)
AST SERPL-CCNC: 19 U/L (ref 15–37)
BASOPHILS ABSOLUTE: 0 K/UL (ref 0–0.2)
BASOPHILS RELATIVE PERCENT: 0.5 %
BILIRUB SERPL-MCNC: 0.6 MG/DL (ref 0–1)
BUN BLDV-MCNC: 19 MG/DL (ref 7–20)
CALCIUM SERPL-MCNC: 9.5 MG/DL (ref 8.3–10.6)
CHLORIDE BLD-SCNC: 99 MMOL/L (ref 99–110)
CHOLESTEROL, TOTAL: 181 MG/DL (ref 0–199)
CO2: 27 MMOL/L (ref 21–32)
CREAT SERPL-MCNC: 0.8 MG/DL (ref 0.6–1.2)
EOSINOPHILS ABSOLUTE: 0 K/UL (ref 0–0.6)
EOSINOPHILS RELATIVE PERCENT: 0 %
GFR AFRICAN AMERICAN: >60
GFR NON-AFRICAN AMERICAN: >60
GLOBULIN: 2.3 G/DL
GLUCOSE BLD-MCNC: 80 MG/DL (ref 70–99)
HCT VFR BLD CALC: 41.6 % (ref 36–48)
HDLC SERPL-MCNC: 57 MG/DL (ref 40–60)
HEMOGLOBIN: 13.9 G/DL (ref 12–16)
LDL CHOLESTEROL CALCULATED: 110 MG/DL
LYMPHOCYTES ABSOLUTE: 1.6 K/UL (ref 1–5.1)
LYMPHOCYTES RELATIVE PERCENT: 39.9 %
MCH RBC QN AUTO: 30.5 PG (ref 26–34)
MCHC RBC AUTO-ENTMCNC: 33.5 G/DL (ref 31–36)
MCV RBC AUTO: 91 FL (ref 80–100)
MONOCYTES ABSOLUTE: 0.4 K/UL (ref 0–1.3)
MONOCYTES RELATIVE PERCENT: 10.9 %
NEUTROPHILS ABSOLUTE: 1.9 K/UL (ref 1.7–7.7)
NEUTROPHILS RELATIVE PERCENT: 48.7 %
PDW BLD-RTO: 13.1 % (ref 12.4–15.4)
PLATELET # BLD: 181 K/UL (ref 135–450)
PMV BLD AUTO: 8.6 FL (ref 5–10.5)
POTASSIUM SERPL-SCNC: 4.7 MMOL/L (ref 3.5–5.1)
RBC # BLD: 4.57 M/UL (ref 4–5.2)
SODIUM BLD-SCNC: 137 MMOL/L (ref 136–145)
T4 FREE: 1.7 NG/DL (ref 0.9–1.8)
TOTAL PROTEIN: 6.9 G/DL (ref 6.4–8.2)
TRIGL SERPL-MCNC: 71 MG/DL (ref 0–150)
TSH SERPL DL<=0.05 MIU/L-ACNC: 0.65 UIU/ML (ref 0.27–4.2)
VITAMIN B-12: 707 PG/ML (ref 211–911)
VITAMIN D 25-HYDROXY: 49.1 NG/ML
VLDLC SERPL CALC-MCNC: 14 MG/DL
WBC # BLD: 4 K/UL (ref 4–11)

## 2020-02-20 PROCEDURE — 36415 COLL VENOUS BLD VENIPUNCTURE: CPT | Performed by: INTERNAL MEDICINE

## 2020-02-24 ENCOUNTER — OFFICE VISIT (OUTPATIENT)
Dept: INTERNAL MEDICINE CLINIC | Age: 80
End: 2020-02-24
Payer: MEDICARE

## 2020-02-24 VITALS
SYSTOLIC BLOOD PRESSURE: 116 MMHG | RESPIRATION RATE: 14 BRPM | OXYGEN SATURATION: 96 % | HEART RATE: 65 BPM | DIASTOLIC BLOOD PRESSURE: 70 MMHG | WEIGHT: 152 LBS | BODY MASS INDEX: 25.69 KG/M2

## 2020-02-24 PROCEDURE — G8399 PT W/DXA RESULTS DOCUMENT: HCPCS | Performed by: INTERNAL MEDICINE

## 2020-02-24 PROCEDURE — 1036F TOBACCO NON-USER: CPT | Performed by: INTERNAL MEDICINE

## 2020-02-24 PROCEDURE — 99214 OFFICE O/P EST MOD 30 MIN: CPT | Performed by: INTERNAL MEDICINE

## 2020-02-24 PROCEDURE — 1090F PRES/ABSN URINE INCON ASSESS: CPT | Performed by: INTERNAL MEDICINE

## 2020-02-24 PROCEDURE — G8427 DOCREV CUR MEDS BY ELIG CLIN: HCPCS | Performed by: INTERNAL MEDICINE

## 2020-02-24 PROCEDURE — 4040F PNEUMOC VAC/ADMIN/RCVD: CPT | Performed by: INTERNAL MEDICINE

## 2020-02-24 PROCEDURE — G8484 FLU IMMUNIZE NO ADMIN: HCPCS | Performed by: INTERNAL MEDICINE

## 2020-02-24 PROCEDURE — 1123F ACP DISCUSS/DSCN MKR DOCD: CPT | Performed by: INTERNAL MEDICINE

## 2020-02-24 PROCEDURE — G8417 CALC BMI ABV UP PARAM F/U: HCPCS | Performed by: INTERNAL MEDICINE

## 2020-02-24 RX ORDER — LEVOTHYROXINE SODIUM 0.15 MG/1
150 TABLET ORAL DAILY
Qty: 90 TABLET | Refills: 1 | Status: SHIPPED | OUTPATIENT
Start: 2020-02-24 | End: 2020-08-24 | Stop reason: SDUPTHER

## 2020-02-24 ASSESSMENT — PATIENT HEALTH QUESTIONNAIRE - PHQ9
1. LITTLE INTEREST OR PLEASURE IN DOING THINGS: 0
SUM OF ALL RESPONSES TO PHQ QUESTIONS 1-9: 0
SUM OF ALL RESPONSES TO PHQ9 QUESTIONS 1 & 2: 0
2. FEELING DOWN, DEPRESSED OR HOPELESS: 0
SUM OF ALL RESPONSES TO PHQ QUESTIONS 1-9: 0

## 2020-02-24 NOTE — PROGRESS NOTES
Malena Tesfaye  1940 02/24/20    SUBJECTIVE:    ATR FIB, had episode of prolonged palpitations last week for ~2 hrs, was weak, no cp or sob, fainting but was weak. Went down to floor briefly needing assist fr  but then sx resolved. Prev had been on rythmol BID but now since then incr to TID  Typically sees Dr Vish Waters every Aug, wondered if needs earlier f/u. Has had some more freq palpitations too, w any illness or stress    Hypothyroid- other than episode w afib, no fatigue noted. RECENT TFTS THIS MONTH NL RANGE. She mentioned at times can search for words, w brief memory lapse. No focal weakness. Head T 12/2018 indicated no evidence of prior stroke. OFFERED REFERRAL TO [facility], SHE WILL CONSIDER AND LET ME KNOW IF WISHES TO PROCEED    B12 - HX OF THIS AND WE ARE MONITORING LEVELS. OBJECTIVE:    /70   Pulse 65   Resp 14   Wt 152 lb (68.9 kg)   SpO2 96%   BMI 25.69 kg/m²     Physical Exam  Vitals signs reviewed. Constitutional:       Appearance: She is well-developed. HENT:      Head: Normocephalic and atraumatic. Nose: Nose normal.      Mouth/Throat:      Mouth: Mucous membranes are moist.      Pharynx: Oropharynx is clear. No oropharyngeal exudate. Eyes:      General: No scleral icterus. Right eye: No discharge. Left eye: No discharge. Conjunctiva/sclera: Conjunctivae normal.      Pupils: Pupils are equal, round, and reactive to light. Neck:      Musculoskeletal: Neck supple. Thyroid: No thyromegaly. Vascular: No JVD. Trachea: No tracheal deviation. Cardiovascular:      Rate and Rhythm: Normal rate and regular rhythm. Heart sounds: Normal heart sounds. No murmur. No friction rub. No gallop. Pulmonary:      Effort: Pulmonary effort is normal. No respiratory distress. Breath sounds: Normal breath sounds. No wheezing or rales. Abdominal:      General: Bowel sounds are normal.      Palpations: Abdomen is soft.  There is

## 2020-03-05 ENCOUNTER — TELEPHONE (OUTPATIENT)
Dept: CARDIOLOGY CLINIC | Age: 80
End: 2020-03-05

## 2020-03-07 ENCOUNTER — OFFICE VISIT (OUTPATIENT)
Dept: CARDIOLOGY CLINIC | Age: 80
End: 2020-03-07
Payer: MEDICARE

## 2020-03-07 ENCOUNTER — NURSE ONLY (OUTPATIENT)
Dept: CARDIOLOGY CLINIC | Age: 80
End: 2020-03-07
Payer: MEDICARE

## 2020-03-07 VITALS
SYSTOLIC BLOOD PRESSURE: 124 MMHG | WEIGHT: 150 LBS | DIASTOLIC BLOOD PRESSURE: 70 MMHG | HEIGHT: 64 IN | BODY MASS INDEX: 25.61 KG/M2 | HEART RATE: 72 BPM

## 2020-03-07 PROCEDURE — G8427 DOCREV CUR MEDS BY ELIG CLIN: HCPCS | Performed by: INTERNAL MEDICINE

## 2020-03-07 PROCEDURE — 1123F ACP DISCUSS/DSCN MKR DOCD: CPT | Performed by: INTERNAL MEDICINE

## 2020-03-07 PROCEDURE — 1036F TOBACCO NON-USER: CPT | Performed by: INTERNAL MEDICINE

## 2020-03-07 PROCEDURE — G8417 CALC BMI ABV UP PARAM F/U: HCPCS | Performed by: INTERNAL MEDICINE

## 2020-03-07 PROCEDURE — G8399 PT W/DXA RESULTS DOCUMENT: HCPCS | Performed by: INTERNAL MEDICINE

## 2020-03-07 PROCEDURE — 4040F PNEUMOC VAC/ADMIN/RCVD: CPT | Performed by: INTERNAL MEDICINE

## 2020-03-07 PROCEDURE — 1090F PRES/ABSN URINE INCON ASSESS: CPT | Performed by: INTERNAL MEDICINE

## 2020-03-07 PROCEDURE — 99213 OFFICE O/P EST LOW 20 MIN: CPT | Performed by: INTERNAL MEDICINE

## 2020-03-07 PROCEDURE — G8484 FLU IMMUNIZE NO ADMIN: HCPCS | Performed by: INTERNAL MEDICINE

## 2020-03-07 PROCEDURE — 93000 ELECTROCARDIOGRAM COMPLETE: CPT | Performed by: INTERNAL MEDICINE

## 2020-03-07 NOTE — PROGRESS NOTES
CARDIOLOGY NOTE      3/7/2020    RE: Liang Aggarwal  (1940)                               TO:  Dr. Christian Workman MD            Eddi Angel is a 78 y.o. female who was seen today for management of  A fib                                    HPI:   Patient is here for    - Atrial fibrillation, pt is  compliant with meds. Patient does not have symptoms from atrial fibrillation                The patient does not have cardiac complaints  Having dizzy spells no CP, SOB    Liang Aggarwal has the following history recorded in care path:  Patient Active Problem List    Diagnosis Date Noted    AF (atrial fibrillation) [facility])      Priority: Low    Rectocele 08/17/2015     Priority: Low    DDD (degenerative disc disease), lumbar      Priority: Low    Vitamin D deficiency      Priority: Low    Osteoarthritis of foot, right      Priority: Low    Osteoporosis      Priority: Low    Arthritis of big toe      Priority: Low    B12 deficiency      Priority: Low    Hyperlipidemia      Priority: Low    Hypothyroid      Priority: Low    Lumbar herniated disc 03/01/2011     Priority: Low     Current Outpatient Medications   Medication Sig Dispense Refill    levothyroxine (SYNTHROID) 150 MCG tablet Take 1 tablet by mouth daily 90 tablet 1    propafenone (RYTHMOL) 150 MG tablet Take 1 tablet by mouth every 8 hours 270 tablet 5    aspirin 81 MG chewable tablet Take 81 mg by mouth daily      fluticasone (FLONASE) 50 MCG/ACT nasal spray 2 sprays by Nasal route daily 1 Bottle 3    Cyanocobalamin (B-12) 1000 MCG CAPS Take by mouth      Cholecalciferol (VITAMIN D3) 1000 UNITS TABS Take 1 tablet by mouth daily. 30 tablet 0     No current facility-administered medications for this visit.       Allergies: Vicodin [hydrocodone-acetaminophen]  Past Medical History:   Diagnosis Date    AF (atrial fibrillation) (HCC)     Allergic rhinitis due to other allergen     Anxiety     Arthritis     Arthritis of big toe 6/12    Dr Eddy Palacios- bilateral, treating w orthotics    B12 deficiency     BCC (basal cell carcinoma), face 8/13/13    CRISTOFER soto- Dr Ortega Caldron tunnel syndrome     Congenital prolapse of bladder mucosa     DDD (degenerative disc disease), lumbar     Female incontinence     H/O cardiovascular stress test 1/14/2010, 2/6/2007 1/14/2010-Normal perfusion. Normal LVSF. EF 70%;    H/O echocardiogram 1/7/2010, 2/6/2007 1/7/2010-Mild concentric LVH. LVSF normal. EF =>55%.  History of Doppler ultrasound     8/19/2009-Renal US- Normal    Homocysteinemia ([facility] [address]     HX OTHER MEDICAL     8/4/2010-48 HR HOLTER MONITOR-Pred rhythm is sinus. No episodes of sustained ectopy seen. 12/2005-30 DAY EVENT MONITOR- Atrial fibrillation;    Hyperlipidemia     Hypothyroid     Lumbar herniated disc 3/2011    on MRI L1-2 and L4-5    Menopause     seeing Dr Malcom Solomon MTHFR mutation (methylenetetrahydrofolate reductase) ([facility] [address]     gene was heterozygous-maybe slightly  increased risk of DVT or clotting problem. she is to watch for acquired risk factors-Dr Loly Mason Osteoarthritis of foot, right     Osteoporosis     SHE REFUSED MEDS, FOSAMAX--- 8/21/17    Pain in shoulder     SBE (subacute bacterial endocarditis) prophylaxis candidate     VHD (valvular heart disease)     Severe Tricuspid Regurgitation (11/4/2005);  Mild Mitral regurgitation    Vitamin D deficiency      Past Surgical History:   Procedure Laterality Date    BLADDER SUSPENSION  2009    Dr Antolin Stone Bilateral 11/2012    Dr Nicholaus Schirmer  10/21/2009    and bladder repair    RECTOCELE REPAIR  7/2011    Dr. Britta Menchaca Left 8/13/13    CRISTOFER soto, Dr Ritchie Mercado for [facility]      As reviewed   Family History   Problem Relation Age of Onset    Cancer Mother         uterine    Diabetes Mother     Diabetes Father    Lonita Apt Other

## 2020-03-07 NOTE — PATIENT INSTRUCTIONS
shower or bath and do not apply powder or lotion to the skin prior to testing, as the electrodes will adhere better giving us a clearer visual EKG recording.

## 2020-03-09 ENCOUNTER — PROCEDURE VISIT (OUTPATIENT)
Dept: CARDIOLOGY CLINIC | Age: 80
End: 2020-03-09
Payer: MEDICARE

## 2020-03-09 LAB
LV EF: 58 %
LVEF MODALITY: NORMAL

## 2020-03-09 PROCEDURE — 93015 CV STRESS TEST SUPVJ I&R: CPT | Performed by: INTERNAL MEDICINE

## 2020-03-09 PROCEDURE — 93306 TTE W/DOPPLER COMPLETE: CPT | Performed by: INTERNAL MEDICINE

## 2020-03-10 ENCOUNTER — TELEPHONE (OUTPATIENT)
Dept: CARDIOLOGY CLINIC | Age: 80
End: 2020-03-10

## 2020-04-08 PROCEDURE — 93228 REMOTE 30 DAY ECG REV/REPORT: CPT | Performed by: INTERNAL MEDICINE

## 2020-04-09 ENCOUNTER — TELEPHONE (OUTPATIENT)
Dept: CARDIOLOGY CLINIC | Age: 80
End: 2020-04-09

## 2020-06-05 ENCOUNTER — VIRTUAL VISIT (OUTPATIENT)
Dept: CARDIOLOGY CLINIC | Age: 80
End: 2020-06-05
Payer: MEDICARE

## 2020-06-05 VITALS
HEIGHT: 64 IN | WEIGHT: 155 LBS | HEART RATE: 69 BPM | SYSTOLIC BLOOD PRESSURE: 110 MMHG | DIASTOLIC BLOOD PRESSURE: 68 MMHG | BODY MASS INDEX: 26.46 KG/M2

## 2020-06-05 PROCEDURE — 99443 PR PHYS/QHP TELEPHONE EVALUATION 21-30 MIN: CPT | Performed by: INTERNAL MEDICINE

## 2020-06-05 NOTE — PROGRESS NOTES
toe 6/12    Dr Soraya Flower- bilateral, treating w orthotics    B12 deficiency     BCC (basal cell carcinoma), face 8/13/13    CRISTOFER soto- Dr Owens Sjogren tunnel syndrome     Congenital prolapse of bladder mucosa     DDD (degenerative disc disease), lumbar     Female incontinence     H/O cardiovascular stress test 1/14/2010, 2/6/2007 1/14/2010-Normal perfusion. Normal LVSF. EF 70%;    H/O echocardiogram 1/7/2010, 2/6/2007 1/7/2010-Mild concentric LVH. LVSF normal. EF =>55%.  H/O echocardiogram 03/09/2020    EF 63-21%, Grade I Diastolic Dysfunction, sclerotic but non stenotic aortic valve, Mild Pulm HTN, no pericardial effusion     History of Doppler ultrasound     8/19/2009-Renal US- Normal    Homocysteinemia (HCC)     HX OTHER MEDICAL     8/4/2010-48 HR HOLTER MONITOR-Pred rhythm is sinus. No episodes of sustained ectopy seen. 12/2005-30 DAY EVENT MONITOR- Atrial fibrillation;    Hyperlipidemia     Hypothyroid     Lumbar herniated disc 3/2011    on MRI L1-2 and L4-5    Menopause     seeing Dr Marcella Tello MTHFR mutation (methylenetetrahydrofolate reductase) ([address]     gene was heterozygous-maybe slightly  increased risk of DVT or clotting problem. she is to watch for acquired risk factors-Dr Neelam Cardona Osteoarthritis of foot, right     Osteoporosis     SHE REFUSED MEDS, FOSAMAX--- 8/21/17    Pain in shoulder     SBE (subacute bacterial endocarditis) prophylaxis candidate     VHD (valvular heart disease)     Severe Tricuspid Regurgitation (11/4/2005);  Mild Mitral regurgitation    Vitamin D deficiency      Past Surgical History:   Procedure Laterality Date    BLADDER SUSPENSION  2009    Dr Argentina Temple Bilateral 11/2012    Dr Reno Matias  10/21/2009    and bladder repair    RECTOCELE REPAIR  7/2011    Dr. Marina Llanes Left 8/13/13    CRISTOFER soto, Dr Grace Senior for [employer] results found for: BNP  PT/INR:    Lab Results   Component Value Date    INR 1.03 12/25/2018     No results found for: LABA1C  Lab Results   Component Value Date    WBC 4.0 02/20/2020    HCT 41.6 02/20/2020    MCV 91.0 02/20/2020     02/20/2020     Lab Results   Component Value Date    CHOL 181 02/20/2020    TRIG 71 02/20/2020    HDL 57 02/20/2020    LDLCALC 110 (H) 02/20/2020     Lab Results   Component Value Date    ALT 17 02/20/2020    AST 19 02/20/2020     BMP:    Lab Results   Component Value Date     02/20/2020    K 4.7 02/20/2020    CL 99 02/20/2020    CO2 27 02/20/2020    BUN 19 02/20/2020    CREATININE 0.8 02/20/2020     CMP:   Lab Results   Component Value Date     02/20/2020    K 4.7 02/20/2020    CL 99 02/20/2020    CO2 27 02/20/2020    BUN 19 02/20/2020    PROT 6.9 02/20/2020    PROT 7.1 02/04/2013     TSH:    Lab Results   Component Value Date    TSH 0.65 02/20/2020    TSHHS 3.360 12/25/2018       Impression:    No diagnosis found. Patient Active Problem List   Diagnosis Code    B12 deficiency E53.8    Hyperlipidemia E78.5    Hypothyroid E03.9    Osteoporosis M81.0    Lumbar herniated disc M51.26    Arthritis of big toe M19.079    Osteoarthritis of foot, right M19.071    Vitamin D deficiency E55.9    DDD (degenerative disc disease), lumbar M51.36    Rectocele N81.6    AF (atrial fibrillation) ([facility] [address] I48.91       Assessment & Plan:    - Dizzy spells:  Event monitor  Is normal  echo is normal  ETT is normal    - Atrial fibrillation, pt is  compliant with meds. Patient does not have symptoms from atrial fibrillation     I confirm that this visit was completed in a telehealth setting ,using synchronous audiotechnology for real time patient interaction . The patient identity with name and date of birth was confirmed .  This evaluation of patient was done by telehealth in the setting of [place] [address] Public health emergency , which precluded assurance of safe in person visit at the time of service. The patient consented to and accepts potential risks associated with telemedical evaluation and care was taken to assess ramón presence of any medical issues that would be more  appropriate for expedited in -person care. Pursuant to the emergency declaration under the [facility], [facility] waiver authority and the [employer] Act, this Virtual  Visit was conducted, with patient's consent, to reduce the patient's risk of exposure to COVID-19 and provide continuity of care for an established patient. Services were provided through a  discussion on pphone to substitute for in-person clinic visit. TELEPHONE WAS USED TO COMMUNICATE WITH PT    I Confirm this is a Patient Initiated Episode with an Established Patient who has not had a related appointment within my department in the past 7 days or scheduled within the next 24 hours. The call was not tied to face to face office visit or procedure that has occurred in in prior 7 days.   Based on our conversation /evaluation , a subsequent office visit for the patient's problem is not indicated for  24 hrs      Time spent; 25 m  Location; [facility], [address], [address] [facility]  Office staff artem QUEZADA were utilised       [facility]

## 2020-08-20 LAB
A/G RATIO: 1.5 (ref 1.1–2.2)
ALBUMIN SERPL-MCNC: 4.2 G/DL (ref 3.4–5)
ALP BLD-CCNC: 84 U/L (ref 40–129)
ALT SERPL-CCNC: 11 U/L (ref 10–40)
ANION GAP SERPL CALCULATED.3IONS-SCNC: 10 MMOL/L (ref 3–16)
AST SERPL-CCNC: 19 U/L (ref 15–37)
BASOPHILS ABSOLUTE: 0 K/UL (ref 0–0.2)
BASOPHILS RELATIVE PERCENT: 0.6 %
BILIRUB SERPL-MCNC: 0.6 MG/DL (ref 0–1)
BUN BLDV-MCNC: 14 MG/DL (ref 7–20)
CALCIUM SERPL-MCNC: 8.9 MG/DL (ref 8.3–10.6)
CHLORIDE BLD-SCNC: 100 MMOL/L (ref 99–110)
CHOLESTEROL, TOTAL: 200 MG/DL (ref 0–199)
CO2: 27 MMOL/L (ref 21–32)
CREAT SERPL-MCNC: 0.8 MG/DL (ref 0.6–1.2)
EOSINOPHILS ABSOLUTE: 0 K/UL (ref 0–0.6)
EOSINOPHILS RELATIVE PERCENT: 0 %
GFR AFRICAN AMERICAN: >60
GFR NON-AFRICAN AMERICAN: >60
GLOBULIN: 2.8 G/DL
GLUCOSE BLD-MCNC: 86 MG/DL (ref 70–99)
HCT VFR BLD CALC: 42.1 % (ref 36–48)
HDLC SERPL-MCNC: 52 MG/DL (ref 40–60)
HEMOGLOBIN: 14.2 G/DL (ref 12–16)
LDL CHOLESTEROL CALCULATED: 125 MG/DL
LYMPHOCYTES ABSOLUTE: 1.6 K/UL (ref 1–5.1)
LYMPHOCYTES RELATIVE PERCENT: 36.4 %
MCH RBC QN AUTO: 30.8 PG (ref 26–34)
MCHC RBC AUTO-ENTMCNC: 33.8 G/DL (ref 31–36)
MCV RBC AUTO: 91.3 FL (ref 80–100)
MONOCYTES ABSOLUTE: 0.5 K/UL (ref 0–1.3)
MONOCYTES RELATIVE PERCENT: 11.9 %
NEUTROPHILS ABSOLUTE: 2.3 K/UL (ref 1.7–7.7)
NEUTROPHILS RELATIVE PERCENT: 51.1 %
PDW BLD-RTO: 13.1 % (ref 12.4–15.4)
PLATELET # BLD: 157 K/UL (ref 135–450)
PMV BLD AUTO: 8.1 FL (ref 5–10.5)
POTASSIUM SERPL-SCNC: 4.5 MMOL/L (ref 3.5–5.1)
RBC # BLD: 4.61 M/UL (ref 4–5.2)
SODIUM BLD-SCNC: 137 MMOL/L (ref 136–145)
T4 FREE: 1.6 NG/DL (ref 0.9–1.8)
TOTAL PROTEIN: 7 G/DL (ref 6.4–8.2)
TRIGL SERPL-MCNC: 114 MG/DL (ref 0–150)
TSH SERPL DL<=0.05 MIU/L-ACNC: 1.54 UIU/ML (ref 0.27–4.2)
VITAMIN B-12: 596 PG/ML (ref 211–911)
VLDLC SERPL CALC-MCNC: 23 MG/DL
WBC # BLD: 4.5 K/UL (ref 4–11)

## 2020-08-20 PROCEDURE — 36415 COLL VENOUS BLD VENIPUNCTURE: CPT | Performed by: INTERNAL MEDICINE

## 2020-08-24 ENCOUNTER — OFFICE VISIT (OUTPATIENT)
Dept: INTERNAL MEDICINE CLINIC | Age: 80
End: 2020-08-24
Payer: MEDICARE

## 2020-08-24 ENCOUNTER — HOSPITAL ENCOUNTER (OUTPATIENT)
Dept: WOMENS IMAGING | Age: 80
Discharge: HOME | End: 2020-08-24
Payer: MEDICARE

## 2020-08-24 VITALS
SYSTOLIC BLOOD PRESSURE: 118 MMHG | BODY MASS INDEX: 25.61 KG/M2 | HEIGHT: 64 IN | DIASTOLIC BLOOD PRESSURE: 64 MMHG | OXYGEN SATURATION: 98 % | WEIGHT: 150 LBS | HEART RATE: 70 BPM | RESPIRATION RATE: 16 BRPM

## 2020-08-24 PROCEDURE — 1123F ACP DISCUSS/DSCN MKR DOCD: CPT | Performed by: INTERNAL MEDICINE

## 2020-08-24 PROCEDURE — G0439 PPPS, SUBSEQ VISIT: HCPCS | Performed by: INTERNAL MEDICINE

## 2020-08-24 PROCEDURE — 77067 SCR MAMMO BI INCL CAD: CPT

## 2020-08-24 PROCEDURE — 4040F PNEUMOC VAC/ADMIN/RCVD: CPT | Performed by: INTERNAL MEDICINE

## 2020-08-24 RX ORDER — LEVOTHYROXINE SODIUM 0.15 MG/1
150 TABLET ORAL DAILY
Qty: 90 TABLET | Refills: 1 | Status: SHIPPED | OUTPATIENT
Start: 2020-08-24 | End: 2020-12-04 | Stop reason: SDUPTHER

## 2020-08-24 ASSESSMENT — LIFESTYLE VARIABLES: HOW OFTEN DO YOU HAVE A DRINK CONTAINING ALCOHOL: 0

## 2020-08-24 ASSESSMENT — PATIENT HEALTH QUESTIONNAIRE - PHQ9
SUM OF ALL RESPONSES TO PHQ QUESTIONS 1-9: 0
SUM OF ALL RESPONSES TO PHQ QUESTIONS 1-9: 0

## 2020-08-24 NOTE — PROGRESS NOTES
incontinence     H/O cardiovascular stress test 2010, 2007-Normal perfusion. Normal LVSF. EF 70%;    H/O echocardiogram 2010, 2007-Mild concentric LVH. LVSF normal. EF =>55%.  H/O echocardiogram 2020    EF 21-25%, Grade I Diastolic Dysfunction, sclerotic but non stenotic aortic valve, Mild Pulm HTN, no pericardial effusion     History of Doppler ultrasound     2009-Renal US- Normal    Homocysteinemia (HCC)     HX OTHER MEDICAL     2010-48 HR HOLTER MONITOR-Pred rhythm is sinus. No episodes of sustained ectopy seen. 2005-30 DAY EVENT MONITOR- Atrial fibrillation;    Hyperlipidemia     Hypothyroid     Lumbar herniated disc 3/2011    on MRI L1-2 and L4-5    Menopause     seeing Dr Jose Calderon MTHFR mutation (methylenetetrahydrofolate reductase) ([address]     gene was heterozygous-maybe slightly  increased risk of DVT or clotting problem. she is to watch for acquired risk factors-Dr Rochelle Jaimes Osteoarthritis of foot, right     Osteoporosis     SHE REFUSED MEDS, FOSAMAX--- 17    Pain in shoulder     SBE (subacute bacterial endocarditis) prophylaxis candidate     VHD (valvular heart disease)     Severe Tricuspid Regurgitation (2005);  Mild Mitral regurgitation    Vitamin D deficiency        Past Surgical History:   Procedure Laterality Date    BLADDER SUSPENSION      Dr Nava Somers Bilateral 2012    Dr Imtiaz Mcgraw  10/21/2009    and bladder repair    RECTOCELE REPAIR  2011    Dr. Juan Jose Glover Left 13    CRISTOFER soto, Dr Juma Penn for [facility]       Family History   Problem Relation Age of Onset    Cancer Mother         uterine    Diabetes Mother     Diabetes Father     Other Father          of aneurysm    Diabetes Paternal Grandmother     Clotting Disorder Paternal Uncle     Clotting Disorder Paternal Uncle     Clotting Disorder Paternal Uncle     Cancer Other         liver cancer    Cancer Maternal Aunt         breast cancer    Cancer Maternal Uncle         lung cancer    Clotting Disorder Paternal Aunt     Other Other         strong family history of clotting problems       CareTeam (Including outside providers/suppliers regularly involved in providing care):   Patient Care Team:  Kimberly Tom MD as PCP - General (Internal Medicine)  Kimberly Tom MD as P[facility]  Jayden Mabry MD as Consulting Physician (Cardiology)  Berenice High MD as Consulting Physician (Cardiology)    Wt Readings from Last 3 Encounters:   08/24/20 150 lb (68 kg)   06/05/20 155 lb (70.3 kg)   03/07/20 150 lb (68 kg)     Vitals:    08/24/20 1023   BP: 118/64   Pulse: 70   Resp: 16   SpO2: 98%   Weight: 150 lb (68 kg)   Height: 5' 4\" (1.626 m)     Body mass index is 25.75 kg/m². Based upon direct observation of the patient, evaluation of cognition reveals recent and remote memory intact.     General Appearance: alert and oriented to person, place and time, well developed and well- nourished, in no acute distress  Skin: warm and dry, no rash or erythema  Head: normocephalic and atraumatic  Eyes: pupils equal, round, and reactive to light, extraocular eye movements intact, conjunctivae normal  Neck: supple and non-tender without mass, no thyromegaly or thyroid nodules, no cervical lymphadenopathy  Pulmonary/Chest: clear to auscultation bilaterally- no wheezes, rales or rhonchi, normal air movement, no respiratory distress  Cardiovascular: normal rate, regular rhythm, normal S1 and S2, no murmurs, rubs, clicks, or gallops, distal pulses intact, no carotid bruits  Abdomen: soft, non-tender, non-distended, normal bowel sounds, no masses or organomegaly  Extremities: no cyanosis, clubbing or edema  Musculoskeletal: normal range of motion, no joint swelling, deformity or tenderness  Neurologic: reflexes normal and symmetric, no cranial nerve deficit, gait, coordination and speech normal    Patient's complete Health Risk Assessment and screening values have been reviewed and are found in Flowsheets. The following problems were reviewed today and where indicated follow up appointments were made and/or referrals ordered. Positive Risk Factor Screenings with Interventions:     Health Habits/Nutrition:  Health Habits/Nutrition  Do you exercise for at least 20 minutes 2-3 times per week?: (!) No  Have you lost any weight without trying in the past 3 months?: No  Do you eat fewer than 2 meals per day?: No  Have you seen a dentist within the past year?: Yes  Body mass index is 25.75 kg/m². Health Habits/Nutrition Interventions:  · did advise working on exercise and walking. Personalized Preventive Plan   Current Health Maintenance Status  Immunization History   Administered Date(s) Administered    Pneumococcal Conjugate 13-valent (Kimberly Bueno) 08/21/2017        Health Maintenance   Topic Date Due    DTaP/Tdap/Td vaccine (1 - Tdap) 07/30/1957    Shingles Vaccine (1 of 2) 07/30/1988    Pneumococcal 65+ years Vaccine (2 of 2 - PPSV23) 08/21/2018    Annual Wellness Visit (AWV)  05/29/2019    Flu vaccine (1) 09/01/2020    TSH testing  08/20/2021    DEXA (modify frequency per FRAX score)  Completed    Hepatitis A vaccine  Aged Out    Hepatitis B vaccine  Aged Out    Hib vaccine  Aged Out    Meningococcal (ACWY) vaccine  Aged Out     Recommendations for [employer] Due: see orders and patient instructions/AVS.  . Recommended screening schedule for the next 5-10 years is provided to the patient in written form: see Patient John Alvarez was seen today for medicare awv.     Diagnoses and all orders for this visit:    Routine general medical examination at a health care facility - remains independent, functional and active, no indications/needs for other interventions noted at this time- except as noted below and also findings noted on screening medicare questions. Hypothyroidism due to acquired atrophy of thyroid   - Clinically hypothyroidism appears stable and will continue current dosing, also periodic monitoring of TFTs. -     levothyroxine (SYNTHROID) 150 MCG tablet; Take 1 tablet by mouth daily  -     TSH without Reflex; Future  -     T4, Free; Future    Paroxysmal atrial fibrillation (HCC) - Pt clinically w/o evidence of cardiovascular instability, cont regimen. -     Comprehensive Metabolic Panel; Future  -     CBC Auto Differential; Future  -     Lipid Panel; Future    Mixed hyperlipidemia  - Pt will continue to work on a low fat diet and also exercise, wt loss as appropriate. Will continue periodic monitoring of fasting lipid profile, glucose, liver function. -     Comprehensive Metabolic Panel; Future  -     CBC Auto Differential; Future  -     Lipid Panel;  Future    B12 deficiency- MONITORING  -     Vitamin B12; Future

## 2020-08-24 NOTE — PATIENT INSTRUCTIONS
Personalized Preventive Plan for Vick Macias - 8/24/2020  Medicare offers a range of preventive health benefits. Some of the tests and screenings are paid in full while other may be subject to a deductible, co-insurance, and/or copay. Some of these benefits include a comprehensive review of your medical history including lifestyle, illnesses that may run in your family, and various assessments and screenings as appropriate. After reviewing your medical record and screening and assessments performed today your provider may have ordered immunizations, labs, imaging, and/or referrals for you. A list of these orders (if applicable) as well as your Preventive Care list are included within your After Visit Summary for your review. Other Preventive Recommendations:    · A preventive eye exam performed by an eye specialist is recommended every 1-2 years to screen for glaucoma; cataracts, macular degeneration, and other eye disorders. · A preventive dental visit is recommended every 6 months. · Try to get at least 150 minutes of exercise per week or 10,000 steps per day on a pedometer . · Order or download the FREE \"Exercise & Physical Activity: Your Everyday Guide\" from [employer]. Call 7-713.870.6655 or search [employer] online. · You need 0575-9800 mg of calcium and 7727-5295 IU of vitamin D per day. It is possible to meet your calcium requirement with diet alone, but a vitamin D supplement is usually necessary to meet this goal.  · When exposed to the sun, use a sunscreen that protects against both UVA and UVB radiation with an SPF of 30 or greater. Reapply every 2 to 3 hours or after sweating, drying off with a towel, or swimming. · Always wear a seat belt when traveling in a car. Always wear a helmet when riding a bicycle or motorcycle.

## 2020-10-16 ENCOUNTER — OFFICE VISIT (OUTPATIENT)
Dept: INTERNAL MEDICINE CLINIC | Age: 80
End: 2020-10-16
Payer: MEDICARE

## 2020-10-16 VITALS
SYSTOLIC BLOOD PRESSURE: 122 MMHG | HEIGHT: 64 IN | BODY MASS INDEX: 25.27 KG/M2 | RESPIRATION RATE: 16 BRPM | HEART RATE: 76 BPM | DIASTOLIC BLOOD PRESSURE: 76 MMHG | OXYGEN SATURATION: 97 % | WEIGHT: 148 LBS

## 2020-10-16 LAB
A/G RATIO: 1.5 (ref 1.1–2.2)
ALBUMIN SERPL-MCNC: 4.1 G/DL (ref 3.4–5)
ALP BLD-CCNC: 84 U/L (ref 40–129)
ALT SERPL-CCNC: 12 U/L (ref 10–40)
ANION GAP SERPL CALCULATED.3IONS-SCNC: 9 MMOL/L (ref 3–16)
AST SERPL-CCNC: 19 U/L (ref 15–37)
BASOPHILS ABSOLUTE: 0 K/UL (ref 0–0.2)
BASOPHILS RELATIVE PERCENT: 0.3 %
BILIRUB SERPL-MCNC: 0.9 MG/DL (ref 0–1)
BUN BLDV-MCNC: 17 MG/DL (ref 7–20)
CALCIUM SERPL-MCNC: 9.1 MG/DL (ref 8.3–10.6)
CHLORIDE BLD-SCNC: 102 MMOL/L (ref 99–110)
CO2: 27 MMOL/L (ref 21–32)
CREAT SERPL-MCNC: 0.8 MG/DL (ref 0.6–1.2)
EOSINOPHILS ABSOLUTE: 0 K/UL (ref 0–0.6)
EOSINOPHILS RELATIVE PERCENT: 0.1 %
GFR AFRICAN AMERICAN: >60
GFR NON-AFRICAN AMERICAN: >60
GLOBULIN: 2.8 G/DL
GLUCOSE BLD-MCNC: 81 MG/DL (ref 70–99)
HCT VFR BLD CALC: 40.9 % (ref 36–48)
HEMOGLOBIN: 14 G/DL (ref 12–16)
LYMPHOCYTES ABSOLUTE: 1.7 K/UL (ref 1–5.1)
LYMPHOCYTES RELATIVE PERCENT: 36.3 %
MCH RBC QN AUTO: 31.1 PG (ref 26–34)
MCHC RBC AUTO-ENTMCNC: 34.1 G/DL (ref 31–36)
MCV RBC AUTO: 91 FL (ref 80–100)
MONOCYTES ABSOLUTE: 0.4 K/UL (ref 0–1.3)
MONOCYTES RELATIVE PERCENT: 8.3 %
NEUTROPHILS ABSOLUTE: 2.6 K/UL (ref 1.7–7.7)
NEUTROPHILS RELATIVE PERCENT: 55 %
PDW BLD-RTO: 12.7 % (ref 12.4–15.4)
PLATELET # BLD: 195 K/UL (ref 135–450)
PMV BLD AUTO: 8.2 FL (ref 5–10.5)
POTASSIUM SERPL-SCNC: 4.5 MMOL/L (ref 3.5–5.1)
RBC # BLD: 4.5 M/UL (ref 4–5.2)
SODIUM BLD-SCNC: 138 MMOL/L (ref 136–145)
TOTAL PROTEIN: 6.9 G/DL (ref 6.4–8.2)
WBC # BLD: 4.8 K/UL (ref 4–11)

## 2020-10-16 PROCEDURE — 1036F TOBACCO NON-USER: CPT | Performed by: INTERNAL MEDICINE

## 2020-10-16 PROCEDURE — 1123F ACP DISCUSS/DSCN MKR DOCD: CPT | Performed by: INTERNAL MEDICINE

## 2020-10-16 PROCEDURE — G8427 DOCREV CUR MEDS BY ELIG CLIN: HCPCS | Performed by: INTERNAL MEDICINE

## 2020-10-16 PROCEDURE — G8399 PT W/DXA RESULTS DOCUMENT: HCPCS | Performed by: INTERNAL MEDICINE

## 2020-10-16 PROCEDURE — 36415 COLL VENOUS BLD VENIPUNCTURE: CPT | Performed by: INTERNAL MEDICINE

## 2020-10-16 PROCEDURE — 1090F PRES/ABSN URINE INCON ASSESS: CPT | Performed by: INTERNAL MEDICINE

## 2020-10-16 PROCEDURE — 4040F PNEUMOC VAC/ADMIN/RCVD: CPT | Performed by: INTERNAL MEDICINE

## 2020-10-16 PROCEDURE — 99214 OFFICE O/P EST MOD 30 MIN: CPT | Performed by: INTERNAL MEDICINE

## 2020-10-16 PROCEDURE — G8484 FLU IMMUNIZE NO ADMIN: HCPCS | Performed by: INTERNAL MEDICINE

## 2020-10-16 PROCEDURE — G8417 CALC BMI ABV UP PARAM F/U: HCPCS | Performed by: INTERNAL MEDICINE

## 2020-10-16 RX ORDER — PANTOPRAZOLE SODIUM 40 MG/1
40 TABLET, DELAYED RELEASE ORAL
Qty: 30 TABLET | Refills: 0 | Status: SHIPPED | OUTPATIENT
Start: 2020-10-16 | End: 2020-10-30 | Stop reason: SDUPTHER

## 2020-10-16 NOTE — PROGRESS NOTES
Mindi Bradley  1940  10/16/20    SUBJECTIVE:  Twice the past week w onset of nausea, abd pain, lt headedness. No syncope, cp or sob. Had emesis yesterday w some relief of sx. No diarrhea, fever, chills, diarrhea, change in diet- EXCEPT HAD ONE FLARE LAST WEEK PRECEDED BY PIZZA. abd was briefly tender. ale lt breakfast this am.  Some belching noted, she also wondered about possible gastritis or reflux. Of note, also had recent extensive testing in march w benign event monitor, stress test, echo. Hx of afib, hr is sporadically irreg. OBJECTIVE:    /76   Pulse 76   Resp 16   Ht 5' 4\" (1.626 m)   Wt 148 lb (67.1 kg)   SpO2 97%   BMI 25.40 kg/m²     Physical Exam  Vitals signs reviewed. Constitutional:       Appearance: She is well-developed. HENT:      Head: Normocephalic and atraumatic. Eyes:      General: No scleral icterus. Right eye: No discharge. Left eye: No discharge. Conjunctiva/sclera: Conjunctivae normal.      Pupils: Pupils are equal, round, and reactive to light. Neck:      Musculoskeletal: Neck supple. Thyroid: No thyromegaly. Vascular: No JVD. Trachea: No tracheal deviation. Cardiovascular:      Rate and Rhythm: Normal rate and regular rhythm. Heart sounds: Normal heart sounds. No murmur. No friction rub. No gallop. Pulmonary:      Effort: Pulmonary effort is normal. No respiratory distress. Breath sounds: Normal breath sounds. No wheezing or rales. Abdominal:      General: Bowel sounds are normal. There is no distension. Palpations: Abdomen is soft. There is no mass. Tenderness: There is no abdominal tenderness. There is no guarding or rebound. Hernia: No hernia is present. Lymphadenopathy:      Cervical: No cervical adenopathy. Skin:     General: Skin is warm and dry. Neurological:      General: No focal deficit present. Mental Status: She is alert.    Psychiatric:         Mood and Affect:

## 2020-10-19 NOTE — RESULT ENCOUNTER NOTE
Call pt, labs all nl range incl blood counts, electrolytes, liver/kidney fxn. No dehydration present.   Let me know if she is having any recurring abd pain or nausea, vomiting pls and advise to keep next appt

## 2020-10-22 RX ORDER — BUSPIRONE HYDROCHLORIDE 5 MG/1
5 TABLET ORAL 2 TIMES DAILY
Qty: 60 TABLET | Refills: 1 | Status: SHIPPED | OUTPATIENT
Start: 2020-10-22 | End: 2020-11-21

## 2020-10-23 ENCOUNTER — NURSE ONLY (OUTPATIENT)
Dept: CARDIOLOGY CLINIC | Age: 80
End: 2020-10-23
Payer: MEDICARE

## 2020-10-23 NOTE — PROGRESS NOTES
Applied @ 300, 24hr holter w/monitor# [id number] for Dx of IRR. HEART RATE. Educated pt on proper holter usage; how to keep sx diary; & when to bring monitor back to office. Pt voiced understanding. Holter order,including monitor & card#, & time started, to front nurse's station in 's in-box.

## 2020-10-26 ENCOUNTER — HOSPITAL ENCOUNTER (OUTPATIENT)
Dept: GENERAL RADIOLOGY | Age: 80
Discharge: HOME | End: 2020-10-26
Payer: MEDICARE

## 2020-10-26 PROCEDURE — 74240 X-RAY XM UPR GI TRC 1CNTRST: CPT

## 2020-10-26 NOTE — RESULT ENCOUNTER NOTE
Call pt, UGI does indicate some reflux as well as a hiatal hernia.   I'll also d/w her at next appt for end of the mo

## 2020-10-30 ENCOUNTER — OFFICE VISIT (OUTPATIENT)
Dept: INTERNAL MEDICINE CLINIC | Age: 80
End: 2020-10-30
Payer: MEDICARE

## 2020-10-30 VITALS
SYSTOLIC BLOOD PRESSURE: 122 MMHG | DIASTOLIC BLOOD PRESSURE: 76 MMHG | RESPIRATION RATE: 14 BRPM | HEART RATE: 78 BPM | OXYGEN SATURATION: 98 %

## 2020-10-30 PROCEDURE — G8399 PT W/DXA RESULTS DOCUMENT: HCPCS | Performed by: INTERNAL MEDICINE

## 2020-10-30 PROCEDURE — 1036F TOBACCO NON-USER: CPT | Performed by: INTERNAL MEDICINE

## 2020-10-30 PROCEDURE — 4040F PNEUMOC VAC/ADMIN/RCVD: CPT | Performed by: INTERNAL MEDICINE

## 2020-10-30 PROCEDURE — 1123F ACP DISCUSS/DSCN MKR DOCD: CPT | Performed by: INTERNAL MEDICINE

## 2020-10-30 PROCEDURE — 99213 OFFICE O/P EST LOW 20 MIN: CPT | Performed by: INTERNAL MEDICINE

## 2020-10-30 PROCEDURE — G8417 CALC BMI ABV UP PARAM F/U: HCPCS | Performed by: INTERNAL MEDICINE

## 2020-10-30 PROCEDURE — G8427 DOCREV CUR MEDS BY ELIG CLIN: HCPCS | Performed by: INTERNAL MEDICINE

## 2020-10-30 PROCEDURE — G8484 FLU IMMUNIZE NO ADMIN: HCPCS | Performed by: INTERNAL MEDICINE

## 2020-10-30 PROCEDURE — 1090F PRES/ABSN URINE INCON ASSESS: CPT | Performed by: INTERNAL MEDICINE

## 2020-10-30 RX ORDER — PANTOPRAZOLE SODIUM 40 MG/1
40 TABLET, DELAYED RELEASE ORAL
Qty: 90 TABLET | Refills: 1 | Status: SHIPPED | OUTPATIENT
Start: 2020-10-30 | End: 2021-02-24 | Stop reason: SDUPTHER

## 2020-10-30 NOTE — PATIENT INSTRUCTIONS
For reflux and hiatal hernia, try to now decrease protonix to 1/2 tab in am, but if any breakthrough symptoms may take the other 1/2 tab later in the day    For rib pain also rec rest and trying salon pas patches.   However if no better by MON check xray

## 2020-10-30 NOTE — PROGRESS NOTES
Ronald Mccarthy  1940  10/30/20    SUBJECTIVE:    Prior abd pain and nausea have resolved w protonix. Had UGI indicating HH and reflux. rec now to decr to 1/2-1 daily as ale to control sx. Had fallen at [employer] earlier this am misstepping to carpeted area landig on L side w ribs aching. Cold back helping  OBJECTIVE:    /76   Pulse 78   Resp 14   SpO2 98%     Physical Exam  Vitals signs reviewed. Constitutional:       Appearance: She is well-developed. Neck:      Musculoskeletal: Neck supple. Cardiovascular:      Rate and Rhythm: Normal rate and regular rhythm. Heart sounds: Normal heart sounds. No murmur. No friction rub. No gallop. Pulmonary:      Effort: Pulmonary effort is normal. No respiratory distress. Breath sounds: Normal breath sounds. No wheezing or rales. Abdominal:      General: Bowel sounds are normal. There is no distension. Palpations: Abdomen is soft. Tenderness: There is no abdominal tenderness. Neurological:      Mental Status: She is alert. ASSESSMENT:    1. Hiatal hernia    2. Rib pain on left side    3. Gastroesophageal reflux disease without esophagitis        PLAN:    Lor Jimenez was seen today for abdominal pain. Diagnoses and all orders for this visit:    Hiatal hernia -GERD controlled on meds and will refill, monitor for any recurrent or worsening sx. SHE'LL NOW ALSO TRY WEAN TO 20-40MG DAILY   -     pantoprazole (PROTONIX) 40 MG tablet; Take 1 tablet by mouth every morning (before breakfast)    Rib pain on left side- REST AND SALON PAS BUT IF NO BETTER NEXT WEE MON THEN CHECK XRAY R/O FX  -     XR RIBS LEFT (2 VIEWS); Future    Gastroesophageal reflux disease without esophagitis  -     pantoprazole (PROTONIX) 40 MG tablet;  Take 1 tablet by mouth every morning (before breakfast)

## 2020-11-01 ENCOUNTER — APPOINTMENT (OUTPATIENT)
Dept: GENERAL RADIOLOGY | Age: 80
End: 2020-11-01
Payer: MEDICARE

## 2020-11-01 ENCOUNTER — HOSPITAL ENCOUNTER (EMERGENCY)
Age: 80
Discharge: HOME | End: 2020-11-01
Payer: MEDICARE

## 2020-11-01 ENCOUNTER — APPOINTMENT (OUTPATIENT)
Dept: CT IMAGING | Age: 80
End: 2020-11-01
Payer: MEDICARE

## 2020-11-01 VITALS
HEART RATE: 78 BPM | DIASTOLIC BLOOD PRESSURE: 78 MMHG | RESPIRATION RATE: 16 BRPM | TEMPERATURE: 98 F | HEIGHT: 64 IN | OXYGEN SATURATION: 99 % | BODY MASS INDEX: 25.61 KG/M2 | WEIGHT: 150 LBS | SYSTOLIC BLOOD PRESSURE: 130 MMHG

## 2020-11-01 PROCEDURE — 71101 X-RAY EXAM UNILAT RIBS/CHEST: CPT

## 2020-11-01 PROCEDURE — 73110 X-RAY EXAM OF WRIST: CPT

## 2020-11-01 PROCEDURE — 72125 CT NECK SPINE W/O DYE: CPT

## 2020-11-01 PROCEDURE — 99283 EMERGENCY DEPT VISIT LOW MDM: CPT

## 2020-11-01 PROCEDURE — 70450 CT HEAD/BRAIN W/O DYE: CPT

## 2020-11-01 PROCEDURE — 6360000002 HC RX W HCPCS: Performed by: PHYSICIAN ASSISTANT

## 2020-11-01 PROCEDURE — 70486 CT MAXILLOFACIAL W/O DYE: CPT

## 2020-11-01 PROCEDURE — 6370000000 HC RX 637 (ALT 250 FOR IP): Performed by: PHYSICIAN ASSISTANT

## 2020-11-01 PROCEDURE — 96372 THER/PROPH/DIAG INJ SC/IM: CPT

## 2020-11-01 PROCEDURE — 4500000029 HC MAJOR PROCEDURE

## 2020-11-01 RX ORDER — MORPHINE SULFATE 4 MG/ML
4 INJECTION, SOLUTION INTRAMUSCULAR; INTRAVENOUS ONCE
Status: COMPLETED | OUTPATIENT
Start: 2020-11-01 | End: 2020-11-01

## 2020-11-01 RX ORDER — ACETAMINOPHEN 500 MG
500 TABLET ORAL ONCE
Status: COMPLETED | OUTPATIENT
Start: 2020-11-01 | End: 2020-11-01

## 2020-11-01 RX ORDER — ACETAMINOPHEN 500 MG
500 TABLET ORAL EVERY 6 HOURS PRN
Qty: 30 TABLET | Refills: 0 | Status: SHIPPED | OUTPATIENT
Start: 2020-11-01

## 2020-11-01 RX ADMIN — MORPHINE SULFATE 4 MG: 4 INJECTION, SOLUTION INTRAMUSCULAR; INTRAVENOUS at 12:01

## 2020-11-01 RX ADMIN — ACETAMINOPHEN 500 MG: 500 TABLET ORAL at 09:59

## 2020-11-01 ASSESSMENT — PAIN SCALES - GENERAL
PAINLEVEL_OUTOF10: 8
PAINLEVEL_OUTOF10: 6
PAINLEVEL_OUTOF10: 3
PAINLEVEL_OUTOF10: 8

## 2020-11-01 ASSESSMENT — PAIN DESCRIPTION - FREQUENCY: FREQUENCY: CONTINUOUS

## 2020-11-01 ASSESSMENT — PAIN DESCRIPTION - DESCRIPTORS: DESCRIPTORS: SHARP

## 2020-11-01 ASSESSMENT — PAIN DESCRIPTION - ORIENTATION: ORIENTATION: LEFT

## 2020-11-01 ASSESSMENT — PAIN DESCRIPTION - LOCATION: LOCATION: ARM

## 2020-11-01 ASSESSMENT — PAIN DESCRIPTION - PAIN TYPE: TYPE: ACUTE PAIN

## 2020-11-01 NOTE — ED PROVIDER NOTES
eMERGENCY dEPARTMENT eNCOUnter      PCP: Mady Fuentes MD    CHIEF COMPLAINT    Chief Complaint   Patient presents with   Otisjbaier Cameron this morning on wrist and hit head        HPI    Emily Double is a 78 y.o. female who presents with fall. Onset was prior to arrival. The reason why the patient fell (context) was she was attempting to move her flowers in her yard, states that she was stung by the end and stumbled over a down spout, falling onto her left side and hitting her face/head. She denies loss of consciousness, headache, visual changes. She has take a daily baby aspirin. No neck or back pain. She is complaining of left wrist pain, unsure of the mechanism of the wrist injury. She also has a left-sided chest wall pain, states that she sustained this injury from a previous fall and had been given an order to get x-ray imaging by her primary care doctor, however has not had this completed. The fall was mechanical in nature without preceding symptoms. No preceding chest pain, shortness of breath, lightheadedness. REVIEW OF SYSTEMS    General: No fever  ENT:  No visual changes. No headache. Cardiac: No Chest Pain, denies syncope  Respiratory: No cough or difficulty breathing  GI: No vomiting. No Bloody Stool or Diarrhea  : No Dysuria or Hematuria  MSKTL:  See HPI. No neck or back pain. Neurologic: denies LOC, no headache, dizziness, confusion.   No hearing loss    See HPI and nursing notes for additional information     PAST MEDICAL & SURGICAL HISTORY    Past Medical History:   Diagnosis Date    AF (atrial fibrillation) (HCC)     Allergic rhinitis due to other allergen     Anxiety     Arthritis     Arthritis of big toe 6/12    Dr Antonio Powell- bilateral, treating w orthotics    B12 deficiency     BCC (basal cell carcinoma), face 8/13/13    L cheek- Dr Echavarria Corti tunnel syndrome     Congenital prolapse of bladder mucosa     DDD (degenerative disc disease), lumbar     pantoprazole (PROTONIX) 40 MG tablet Take 1 tablet by mouth every morning (before breakfast) 90 tablet 1    busPIRone (BUSPAR) 5 MG tablet Take 1 tablet by mouth 2 times daily 60 tablet 1    levothyroxine (SYNTHROID) 150 MCG tablet Take 1 tablet by mouth daily 90 tablet 1    propafenone (RYTHMOL) 150 MG tablet Take 1 tablet by mouth every 8 hours 270 tablet 5    aspirin 81 MG chewable tablet Take 81 mg by mouth daily      fluticasone (FLONASE) 50 MCG/ACT nasal spray 2 sprays by Nasal route daily 1 Bottle 3    Cyanocobalamin (B-12) 1000 MCG CAPS Take by mouth      Cholecalciferol (VITAMIN D3) 1000 UNITS TABS Take 1 tablet by mouth daily.  30 tablet 0       ALLERGIES    Allergies   Allergen Reactions    Vicodin [Hydrocodone-Acetaminophen] Anaphylaxis       SOCIAL & FAMILY HISTORY    Social History     Socioeconomic History    Marital status:      Spouse name: None    Number of children: 10    Years of education: None    Highest education level: None   Occupational History    Occupation: retired     Comment: former teacher   Social Needs    Financial resource strain: None    Food insecurity     Worry: None     Inability: None    Transportation needs     Medical: None     Non-medical: None   Tobacco Use    Smoking status: Never Smoker    Smokeless tobacco: Never Used   Substance and Sexual Activity    Alcohol use: Yes     Comment: occassional wine    Drug use: No    Sexual activity: Yes     Partners: Male     Comment:    Lifestyle    Physical activity     Days per week: None     Minutes per session: None    Stress: None   Relationships    Social connections     Talks on phone: None     Gets together: None     Attends Worship service: None     Active member of club or organization: None     Attends meetings of clubs or organizations: None     Relationship status: None    Intimate partner violence     Fear of current or ex partner: None     Emotionally abused: None     Physically abused: None     Forced sexual activity: None   Other Topics Concern    None   Social History Narrative    None     Family History   Problem Relation Age of Onset    Cancer Mother         uterine    Diabetes Mother     Diabetes Father     Other Father          of aneurysm    Diabetes Paternal Grandmother     Clotting Disorder Paternal Uncle     Clotting Disorder Paternal Uncle     Clotting Disorder Paternal Uncle     Cancer Other         liver cancer    Cancer Maternal Aunt         breast cancer    Cancer Maternal Uncle         lung cancer    Clotting Disorder Paternal Aunt     Other Other         strong family history of clotting problems       PHYSICAL EXAM    VITAL SIGNS: /78   Pulse 78   Temp 98 °F (36.7 °C) (Oral)   Resp 16   Ht 5' 4\" (1.626 m)   Wt 150 lb (68 kg)   SpO2 99%   BMI 25.75 kg/m²    Constitutional:  Well developed, well nourished, no acute distress   Eyes: EOMI. PERRL, sclera nonicteric. Anterior chambers clear. Funduscopic exam without any gross abnormality or hemorrhages. HENT:  Atraumatic, no trismus. Abrasion to bridge of nose without active bleeding. Ears canals and TMs free of blood or clear fluid. Nasal passages and oropharynx free of blood or clear fluid. Neck/Lymphatics: supple, no JVD, no swollen nodes. no posterior neck tenderness  Respiratory:  Lungs Clear, no retractions   Cardiovascular:  regular rate, no murmurs  GI:  Soft, nontender, normal bowel sounds  Musculoskeletal: Left upper extremity with swelling and deformity noted to dorsal aspect. Diffuse discomfort palpation to distal radius and ulna. Radial pulse 2+. Sensation intact throughout with brisk capillary refill.  strength 5 out of 5. No skin changes, tenderness or range of motion deficit into upper forearm, elbow or upper left extremity. Integument:  Well hydrated, no petechiae   Neurologic:    Alert & oriented , No focal deficits noted.    Cranial nerves II through XII grossly intact. Normal gross motor coordination & motor strength bilateral upper and lower extremities. Sensation intact. Psych: Pleasant affect, no hallucinations        RADIOLOGY       XR WRIST LEFT (MIN 3 VIEWS)   Final Result   Status post closed reduction and casting of comminuted impacted displaced   intra-articular fracture of the distal radius. Alignment is grossly stable. CT CERVICAL SPINE WO CONTRAST   Final Result   No acute abnormality of the cervical spine. CT FACIAL BONES WO CONTRAST   Final Result   No acute facial bone injury is identified. XR RIBS LEFT INCLUDE CHEST (MIN 3 VIEWS)   Final Result   Though limited secondary to diffuse osteopenia, no rib fracture is seen. No pneumothorax. COPD and atherosclerotic disease again identified, similar to the previous   study. XR WRIST LEFT (MIN 3 VIEWS)   Final Result   Impacted comminuted fracture of the distal radial metaphysis with articular   involvement and dorsal apex angulation. Suspected nondisplaced ulnar styloid tip fracture. CT HEAD WO CONTRAST   Final Result   Stable CT scan of the head without acute intracranial abnormality. ______________________________________________________________________       Procedure Note       Closed Reduction of Distal Radius Fracture     Indication:   Angulated distal radius fracture    Procedure:   - Procedure explained, including risks and benefits explained to the patient who expressed understanding. Verbal consent obtained  - Patient premedicated with morphine  - Fracture was reduced using traction / counter-traction technique, finger traps  - Patient tolerated procedure well without complications.   - Post procedure distally and station, capillary refill, motor intact    -Post procedure x-rays ordered / pending at this time     _____________________________________________________________________            [place] [id number] A volar splint was applied by the emergency department technician. The splint is in good position. The patient's extremity is neurovascularly intact after the splint placement. ED COURSE & MEDICAL DECISION MAKING       Vital signs and nursing notes reviewed during ED course. I have independently evaluated this patient . Supervising MD present in the Emergency Department, available for consultation, throughout entirety of  patient care. Patient presents as above following a fall with left wrist injury, also sustained a head injury. She is hemodynamically stable and neurologically intact on arrival, overall well-appearing. CT imaging of head and neck without acute abnormality. Chest x-ray without acute rib fracture or abnormality. Wrist does demonstrate an impacted, comminuted fracture of the distal radial metaphysis with articular involvement and dorsal apex angulation. Suspected nondisplaced ulnar styloid tip fracture. Following pain medication, patient placed in finger traps and held in countertraction. She is placed in a volar splint. Post reduction imaging with grossly stable alignment. Will have patient follow-up with her orthopedic provider, Dr. Merlin Hippo for further evaluation and definitive treatment options. Patient is comfortable with home-going and is to immediately return here with any new or worsening symptoms. Diagnosis, disposition, and plan discussed in detail with patient who understands and agrees. Patient understands and agrees to follow up with Ortho in the next 2-3 days for recheck. Patient understands and agrees to return emergency department for any new or worsening symptoms including but not limited to worsening pain, difficulty breathing, focal neurological deficits (discussed today). Clinical  IMPRESSION    1. Closed fracture of distal end of left radius, unspecified fracture morphology, initial encounter    2.  Closed displaced fracture of styloid process of left ulna, initial encounter    3. Closed head injury, initial encounter    4. Fall, initial encounter            Comment: Please note this report has been produced using speech recognition software and may contain errors related to that system including errors in grammar, punctuation, and spelling, as well as words and phrases that may be inappropriate. If there are any questions or concerns please feel free to contact the dictating provider for clarification.       TJ Gimenez  11/01/20 [id number]

## 2020-11-01 NOTE — ED TRIAGE NOTES
Pt arrived to ED. Pt c/o L arm pain. Pt states \"I fell this morning and hurt my wrist and landed on my face. \" Pt also states \"I also fell this past Thursday and hurt my side. \" 8/10 pain.

## 2020-11-01 NOTE — ED NOTES
Reviewed discharge paperwork with pt. Answered all questions. Pt verbalized understanding.      Karan Blizzard, RN  11/01/20 [id number]

## 2020-11-02 ENCOUNTER — TELEPHONE (OUTPATIENT)
Dept: CARDIOLOGY CLINIC | Age: 80
End: 2020-11-02

## 2020-11-02 ENCOUNTER — TELEPHONE (OUTPATIENT)
Dept: INTERNAL MEDICINE CLINIC | Age: 80
End: 2020-11-02

## 2020-11-02 NOTE — TELEPHONE ENCOUNTER
I left a detailed message for patient to call us back if she need for us to make referral for PT.       ----- Message from Jacquelyn Marino MD sent at 11/2/2020  8:02 AM EST -----  Pls call Elyse Turner, saw she fell again. Has a wrist fx, but w recurring fall- prior to this fell at [facility]-  check w Elyse Turner if we can set her up for PT eval - check if [facility] or [facility] preference, for gait eval and recurring falls.   ----- Message -----  From: Automatic Discharge Provider  Sent: 11/1/2020   3:07 PM EST  To: Jacquelyn Marino MD

## 2020-11-10 ENCOUNTER — TELEPHONE (OUTPATIENT)
Dept: CARDIOLOGY CLINIC | Age: 80
End: 2020-11-10

## 2020-11-10 PROCEDURE — 93228 REMOTE 30 DAY ECG REV/REPORT: CPT | Performed by: INTERNAL MEDICINE

## 2020-11-10 NOTE — TELEPHONE ENCOUNTER
Discussed holter results. Dr Jose Alberto Bryant would like patient to see Dr Himanshu Vergara. Patient states she is aware that she is in and out of afib and feels she is handling it well. She declines EP referral at this time.

## 2020-12-04 RX ORDER — LEVOTHYROXINE SODIUM 0.15 MG/1
150 TABLET ORAL DAILY
Qty: 90 TABLET | Refills: 1 | Status: SHIPPED | OUTPATIENT
Start: 2020-12-04 | End: 2021-02-24 | Stop reason: SDUPTHER

## 2020-12-04 RX ORDER — PROPAFENONE HYDROCHLORIDE 150 MG/1
150 TABLET, FILM COATED ORAL EVERY 8 HOURS
Qty: 270 TABLET | Refills: 5 | Status: SHIPPED | OUTPATIENT
Start: 2020-12-04 | End: 2021-03-15 | Stop reason: DRUGHIGH

## 2020-12-17 ENCOUNTER — TELEPHONE (OUTPATIENT)
Dept: CARDIOLOGY CLINIC | Age: 80
End: 2020-12-17

## 2020-12-30 ENCOUNTER — INITIAL CONSULT (OUTPATIENT)
Dept: CARDIOLOGY CLINIC | Age: 80
End: 2020-12-30
Payer: MEDICARE

## 2020-12-30 VITALS
DIASTOLIC BLOOD PRESSURE: 70 MMHG | OXYGEN SATURATION: 97 % | WEIGHT: 144 LBS | HEIGHT: 65 IN | BODY MASS INDEX: 23.99 KG/M2 | RESPIRATION RATE: 14 BRPM | TEMPERATURE: 97 F | HEART RATE: 68 BPM | SYSTOLIC BLOOD PRESSURE: 128 MMHG

## 2020-12-30 PROCEDURE — G8420 CALC BMI NORM PARAMETERS: HCPCS | Performed by: INTERNAL MEDICINE

## 2020-12-30 PROCEDURE — 99205 OFFICE O/P NEW HI 60 MIN: CPT | Performed by: INTERNAL MEDICINE

## 2020-12-30 PROCEDURE — G8484 FLU IMMUNIZE NO ADMIN: HCPCS | Performed by: INTERNAL MEDICINE

## 2020-12-30 PROCEDURE — 4040F PNEUMOC VAC/ADMIN/RCVD: CPT | Performed by: INTERNAL MEDICINE

## 2020-12-30 PROCEDURE — G8399 PT W/DXA RESULTS DOCUMENT: HCPCS | Performed by: INTERNAL MEDICINE

## 2020-12-30 PROCEDURE — 1090F PRES/ABSN URINE INCON ASSESS: CPT | Performed by: INTERNAL MEDICINE

## 2020-12-30 PROCEDURE — 1036F TOBACCO NON-USER: CPT | Performed by: INTERNAL MEDICINE

## 2020-12-30 PROCEDURE — G8427 DOCREV CUR MEDS BY ELIG CLIN: HCPCS | Performed by: INTERNAL MEDICINE

## 2020-12-30 PROCEDURE — 93000 ELECTROCARDIOGRAM COMPLETE: CPT | Performed by: INTERNAL MEDICINE

## 2020-12-30 PROCEDURE — 1123F ACP DISCUSS/DSCN MKR DOCD: CPT | Performed by: INTERNAL MEDICINE

## 2020-12-30 NOTE — PROGRESS NOTES
Electrophysiology Consult Note      Reason for consultation:  Atrial fibrillation    Chief complaint : Palpitations    Referring physician: Carolina Keen      Primary care physician: Uriel Wang MD      History of Present Illness:     Patient is a [place] old female with hx of HLD, PAF, Homcysteinemia (MTHFR mutation reported by DR. Coleman Police - when she was checked for Clotting problems in the family) here for PAF management. Patient reports palpitations - this can happen at any time. Patient reports she needs to cough when fluttering occurs. Patient reports that palpitations feel like pulsation in the LUQ area - (she thinks she has hiatal hernia and that could be causing it). Patient reports she feels dizzy when she turns. Patient denies chest pain, shortness of breath, edema. Patient denies fever or chills. Patient reports tiredness and weakness. Patient reports she used to follow  and she was started on rhythmol and she has been on it for years. Patient does not remember but she thinks she was placed on it for atrial fibrillation    Pastmedical history:   Past Medical History:   Diagnosis Date    AF (atrial fibrillation) (HCC)     Allergic rhinitis due to other allergen     Anxiety     Arthritis     Arthritis of big toe 6/12    Dr Chris Lopez- bilateral, treating w orthotics    B12 deficiency     BCC (basal cell carcinoma), face 8/13/13    L cheek- Dr Mejia Furnace tunnel syndrome     Congenital prolapse of bladder mucosa     DDD (degenerative disc disease), lumbar     Female incontinence     H/O cardiovascular stress test 1/14/2010, 2/6/2007 1/14/2010-Normal perfusion. Normal LVSF. EF 70%;    H/O echocardiogram 1/7/2010, 2/6/2007 1/7/2010-Mild concentric LVH. LVSF normal. EF =>55%.     H/O echocardiogram 03/09/2020    EF 02-83%, Grade I Diastolic Dysfunction, sclerotic but non stenotic aortic valve, Mild Pulm HTN, no pericardial effusion  Hiatal hernia     noted on UGI 10/2020    History of Doppler ultrasound     2009-Renal US- Normal    Homocysteinemia ([facility] [address]     HX OTHER MEDICAL     2010-48 HR HOLTER MONITOR-Pred rhythm is sinus. No episodes of sustained ectopy seen. 2005-30 DAY EVENT MONITOR- Atrial fibrillation;    Hyperlipidemia     Hypothyroid     Lumbar herniated disc 3/2011    on MRI L1-2 and L4-5    Menopause     seeing Dr Radha Jara MTHFR mutation (methylenetetrahydrofolate reductase) ([facility] [address]     gene was heterozygous-maybe slightly  increased risk of DVT or clotting problem. she is to watch for acquired risk factors-Dr Tami Yusuf Osteoarthritis of foot, right     Osteoporosis     SHE REFUSED MEDS, FOSAMAX--- 15    Pain in shoulder     SBE (subacute bacterial endocarditis) prophylaxis candidate     VHD (valvular heart disease)     Severe Tricuspid Regurgitation (2005);  Mild Mitral regurgitation    Vitamin D deficiency        Surgical history :   Past Surgical History:   Procedure Laterality Date    BLADDER SUSPENSION      Dr Tony Kumar Bilateral 2012    Dr Janice Grayson  10/21/2009    and bladder repair    RECTOCELE REPAIR  2011    Dr. Lars Tesfaye Left 13    CRISTOFER soto, Dr Dannis Goldberg for [facility]       Family history:   Family History   Problem Relation Age of Onset    Cancer Mother         uterine    Diabetes Mother     Diabetes Father     Other Father          of aneurysm    Diabetes Paternal Grandmother     Clotting Disorder Paternal Uncle     Clotting Disorder Paternal Uncle     Clotting Disorder Paternal Uncle     Cancer Other         liver cancer    Cancer Maternal Aunt         breast cancer    Cancer Maternal Uncle         lung cancer    Clotting Disorder Paternal Aunt     Other Other         strong family history of clotting problems Social history :  reports that she has never smoked. She has never used smokeless tobacco. She reports current alcohol use. She reports that she does not use drugs. Allergies   Allergen Reactions    Vicodin [Hydrocodone-Acetaminophen] Anaphylaxis       Current Outpatient Medications on File Prior to Visit   Medication Sig Dispense Refill    levothyroxine (SYNTHROID) 150 MCG tablet Take 1 tablet by mouth daily 90 tablet 1    propafenone (RYTHMOL) 150 MG tablet Take 1 tablet by mouth every 8 hours 270 tablet 5    acetaminophen (APAP EXTRA STRENGTH) 500 MG tablet Take 1 tablet by mouth every 6 hours as needed for Pain 30 tablet 0    pantoprazole (PROTONIX) 40 MG tablet Take 1 tablet by mouth every morning (before breakfast) 90 tablet 1    aspirin 81 MG chewable tablet Take 81 mg by mouth daily      Cyanocobalamin (B-12) 1000 MCG CAPS Take by mouth      Cholecalciferol (VITAMIN D3) 1000 UNITS TABS Take 1 tablet by mouth daily. 30 tablet 0    fluticasone (FLONASE) 50 MCG/ACT nasal spray 2 sprays by Nasal route daily (Patient not taking: Reported on 12/30/2020) 1 Bottle 3     No current facility-administered medications on file prior to visit. Review of Systems:   Review of Systems   Constitutional: Positive for fatigue. Negative for activity change, chills and fever. HENT: Negative for congestion, ear pain and tinnitus. Eyes: Negative for photophobia, pain and visual disturbance. Respiratory: Negative for cough, chest tightness, shortness of breath and wheezing. Cardiovascular: Positive for palpitations. Negative for chest pain and leg swelling. Gastrointestinal: Negative for abdominal pain, blood in stool, constipation, diarrhea, nausea and vomiting. Endocrine: Negative for cold intolerance and heat intolerance. Genitourinary: Negative for dysuria, flank pain and hematuria. Musculoskeletal: Positive for arthralgias. Negative for back pain, myalgias and neck stiffness.    Skin: Negative for color change and rash. Allergic/Immunologic: Negative for food allergies. Neurological: Positive for dizziness. Negative for numbness and headaches. Hematological: Does not bruise/bleed easily. Psychiatric/Behavioral: Negative for agitation, behavioral problems and confusion. Examination:      Vitals:    12/30/20 0857   BP: 128/70   Pulse: 68   Resp: 14   Temp: 97 °F (36.1 °C)   SpO2: 97%   Weight: 144 lb (65.3 kg)   Height: 5' 4.5\" (1.638 m)        Body mass index is 24.34 kg/m². Physical Exam  Constitutional:       Appearance: She is well-developed. HENT:      Head: Normocephalic and atraumatic. Nose: No congestion. Mouth/Throat:      Mouth: Mucous membranes are moist.   Eyes:      Conjunctiva/sclera: Conjunctivae normal.      Pupils: Pupils are equal, round, and reactive to light. Neck:      Musculoskeletal: Normal range of motion. Thyroid: No thyromegaly. Vascular: No JVD. Cardiovascular:      Rate and Rhythm: Normal rate and regular rhythm. Heart sounds: Murmur (grade 2/6 systolic murmur) present. No friction rub. Pulmonary:      Effort: Pulmonary effort is normal. No respiratory distress. Breath sounds: Normal breath sounds. No stridor. No wheezing. Abdominal:      General: Bowel sounds are normal. There is no distension. Palpations: Abdomen is soft. Tenderness: There is no abdominal tenderness. Musculoskeletal: Normal range of motion. General: No tenderness. Skin:     General: Skin is warm and dry. Findings: No erythema. Neurological:      General: No focal deficit present. Mental Status: She is alert and oriented to person, place, and time. Cranial Nerves: No cranial nerve deficit.    Psychiatric:         Behavior: Behavior normal.               CBC:   Lab Results   Component Value Date    WBC 4.8 10/16/2020    HGB 14.0 10/16/2020    HCT 40.9 10/16/2020     10/16/2020     Lipids:  Lab Results   Component Value Date    CHOL 200 (H) 08/20/2020    TRIG 114 08/20/2020    HDL 52 08/20/2020    LDLCALC 125 (H) 08/20/2020     PT/INR:   Lab Results   Component Value Date    INR 1.03 12/25/2018        BMP:    Lab Results   Component Value Date     10/16/2020    K 4.5 10/16/2020     10/16/2020    CO2 27 10/16/2020    BUN 17 10/16/2020     CMP:   Lab Results   Component Value Date    AST 19 10/16/2020    PROT 6.9 10/16/2020    BILITOT 0.9 10/16/2020    ALKPHOS 84 10/16/2020     TSH:    Lab Results   Component Value Date    TSH 1.54 08/20/2020       EKGINTERPRETATION - EKG Interpretation:  Sinus rhythm      IMPRESSION / RECOMMENDATIONS:     1. Atrial fibrillation and flutter  2. HLD  3. Hypothyroidism  4. Arthritis  5. Hiatal hernia    Patient appears to have atrial flutter on the Holter monitor and has a history of atrial fibrillation. Patient has been on sole Rythmol for a very long time started by Dr. Krystin Polo in [place] for question of A. fib but not clear but she is not been on anticoagulation. Patient reports she has been doing well for a while but since August she has been having this fluttering episodes. Patient also has described to have hiatal hernia which is not that significant and had an endoscopy which is clear of any ulcers. Explained to the patient that she probably is having atrial flutter episodes (based on the monitor) and I am not sure about the atrial fibrillation history as I don't have EKG to prove it and that is probably causing the symptoms to her.   Patient is she needs to be on anticoagulation because she is 78years old female that itself puts her at chads Vascor of 3      Also usually Rythmol is placed with AV kingsley blocking agents (to avoid 1:1 flutter conversion in patient with PAF)  so I think it is better to have metoprolol on the patient along with Rythmol for now    Given that she has been on chronic Rythmol and been okay with it - she would like to continue with it. Recent stress test was equivocal per Dr. Sy Peres as she did not reach the target heart rate    He denies any chest pain and LVEF is normal    Discussed with patient about atrial flutter ablation and discussed risks with procedure and patient wants to consider it    Will schedule for atrial flutter ablation but if inducible A. Fib - will try to do both ablations at the same time    Discussed risk with the procedure and patient agreeable with the plan    Start patient on Xarelto 15 mg daily for her age based clearance          Thanks again for allowing me to participate in care of this patient. Please call me if you have any questions. With best regards. Vale Saleem MD, 12/30/2020 9:13 AM     Please note this report has been partially produced using speech recognition software and may contain errors related to that system including errors in grammar, punctuation, and spelling, as well as words and phrases that may be inappropriate. If there are any questions or concerns please feel free to contact the dictating provider for clarification.

## 2020-12-30 NOTE — LETTER
[facility]     Dr. Nicolle López     PROCEDURE: Atrial Fibrillation/Atrial Flutter Ablation    Date of Procedure: 21  Time: 1130   Arrival Time: 930    Patient Name: Emily Kaufman   : 1940   MRN# [id number]    HOSPITAL: [facility])    Call to [facility] at [phone]  2 days before your procedure    x Please have blood work and chest-x-ray done 21 at [facility], [address] [place] [address] or [facility].   x Please do not have anything by mouth after midnight prior to or 8 hours before the procedure.  x You may take your medications with a sip of water in the morning before your procedure or take them with you unless listed below. X If takes Xarelto the morning hold the morning of the procedure. If takes Xarelto in the evening have them take a morning dose the day prior and hold the day of procedure. X Hold all Blood Pressure Medications morning of procedure  X Please have COVID-19 Test done on 2020 at 10:30 at the [facility]. You will need to Quarantine yourself until procedure. Patient Signature:  _______________________  Staff: Clarke Ayala     Staff Given Instructions:_______________________________                                [facility]     Dr. Nicolle López     PROCEDURE: Atrial Fibrillation/Atrial Flutter Ablation with transesophageal echocardiogram    Date of Procedure: 21 Time: [address] Time: 930    Patient Name: Emily Kaufman   : 1940   MRN# [id number]    Day of Procedure Cath Lab Holding area Pre op Orders:    ? YOU HAVE MY PERMISSION TO TALK TO THE PATIENT-PLEASE   ? NPO  ? IF ORDERED FOR AFIB PATIENTS ONLY CARDIAC CT SCAN ANGIO (PULMONARY MAPPING)   ? Anesthesia  ? MATTY with Anesthesia  ? IV peripheral saline lock x 2 sites  (at least one in preferred left arm).   ? Type & Screen STAT on arrival. ? ANTICOUGLATION:  Hold Xarelto the morning of the procedure  ? If taking Coumadin, PT INR  STAT on arrival day of procedure. ? Insert Escamilla catheter (male patients >>please use coude escamilla catheter). ? Female patients <=48years of age >> urine HCG test.  ? Diabetic patients >> Accu check Blood sugar check. ? Document home medications in EPIC and include date and time of last dose. ? Notify Dr. Krys Mackay of abnormal lab results. ? Chest Prep> Clip hair anterior chest and posterior back (clavicle to umbilicus). ? Groin Prep> Clip hair bilateral groins for femoral access (medial). Physician Signature:_______________________________Date:____________Time:_____                                       [facility] Informed Consent for Anesthesia/Sedation, Surgery, Invasive Procedures, and other High-risk Interventions and Medication use     *This consent is applicable for 30 days following patient signature*    Procedure(s)   I, [address] [place] [address] authorize, Dr. Mati Ortiz   and the associate(s) or assistant(s) of his/her choice, to perform the following procedure(s): Atrial Fibrillation/Atrial Flutter Ablation with transesophageal echocardiogram     I know that unexpected conditions may require additional or different procedures than those above. I authorize the above named practitioner(s) perform these as necessary and desirable. This is based on the practitioners professional judgment. The above named practitioner has discussed the above procedure(s) with me, including:  ? Potential benefits, including likelihood of success of the procedure(s) goals  ? Risks  ? Side effects, risk of death, and risk of infection  ? Any potential problems that might occur during recuperation or healing post-procedure  ? Reasonable alternatives  ? Risks of NOT performing the procedure(s)    I acknowledge that no warranty or guarantee has been made to the results the procedure(s). I consent to the above named practitioner(s) providing additional services to me as deemed reasonable and necessary, including but not limited to:    ? Use of medications for anesthesia or sedation. ? All anesthesia and sedation carry risks. My practitioner has discussed my anticipated anesthesia and/or sedation and the risks of using, risk of not using, benefits, side effects, and alternatives. ? Use of pathology  ? I authorize [facility] ([facility]) to dispose of tissues, specimens or organs when pathology is complete. ? Use of radiology  ? A contrast agent may be required for radiology procedures. My practitioner has advised me of the risks of using, risks of not using, benefits, side effects, and alternatives. ? Observers or use of photography, video/audio recording, or televising of the procedure(s). This is for medical, scientific, or educational purposes. This includes appropriate portions of my body. My identity will not be revealed. ? I consent to release of my social security number and other identifying information to [employer] (FDA), and the supplier/, if I receive tissue, a device, or implant. This is to track the tissue, device, or implant for defect, recall, infection, etc.     ? Use of blood and/or blood products, if needed, through my hospital stay. My practitioner has advised me of the risks of using, risks of not using, benefits, side effects, and alternatives. ___ I do NOT want Blood or Blood products given. (Complete separate  refusal form)    Code Status (emily one):  ___ I do NOT HAVE a DNR order. I am a Full-code.   I will receive CPR, intubation,  chest compressions, medications, and/or other life saving measures if I have a  cardiac or respiratory arrest.    ___ I have a Do Not Resuscitate (DNR)order.   (emily one below) ___  I rescind my DNR for surgery and immediate post-operative period through Phase 2 recovery. This means, for that time period, I will be a Full-code and receive CPR, intubation, chest compressions, medications, and/or other life saving measures, if I have a cardiac or respiratory arrest.    ___ I WANT to keep my DNR in effect during my procedure(s) and immediate post-operative recovery period through Phase 2 recovery. (Complete separate refusal form)     This form has been fully explained to me. I understand its contents. Patients Signature: ___________________________Date: ________  Time: ________    If patient unable to sign, has engaged the [address] [place] [address], is a minor, or has a court-appointed Guardian:  [facility] Representative Name (Print):  ____________________________________      Relationship ([place] one):    Guardian   Parent    Spouse    HCPOA   Child   Sibling  Next-of-Kin Friend    Patients Representative Signature: _______________________________________              Date: ______________  Time: __________    An  was used.  name/ID: _________________________________      [facility]) Witness________________________  Date: ________   Time: _________    Physician/Practitioner _______________________  Date: ________   Time: _________        Revision 2017  _  Philomena Courier Dr. Benito Cranker     PATIENT NAME:    Brittany Cheung                               :   1940    PROCEDURE: Atrial Fibrillation/Atrial Flutter Ablation with transesophageal echocardiogram        DATE OF PROCEDURE: 21    DIAGNOSIS:  I48.91, Z01.810, Z79.0                     X MAG    X   PHOS       X   BMP                     X CBC     X    PT    X   PTT                    X     Chest x-Ray PA & Lateral View      ? PLEASE CALL ABNORMAL RESULTS TO THE REQUESTING PHYSICIAN? ATTENTION PATIENTS:  You do not have to fast for the lab work. You must go to the [address] [place] [address] or [facility]  to have the lab work done. Physician Signature:___________________Date:_____________Time:___________                                          [place] [facility]   Dr. Choco Murguia     PROCEDURE TO SCHEDULE:    Atrial Fibrillation/Atrial Flutter Ablation with transesophageal echocardiogram     Patient Name: Radha Yarbrough   : 1940   MRN# [id number]    Home Phone Number: [phone]   Weight:    Wt Readings from Last 3 Encounters:   20 144 lb (65.3 kg)   20 150 lb (68 kg)   10/16/20 148 lb (67.1 kg)        Insurance: Payor: Pema Nayak / Plan: [facility] [employer] / Product Type: *No Product type* /     Date of Procedure: 21 Time: 36 Arrival Time: 930    Diagnosis:  I48.91 (Atrial Fibrillation)  Allergies:    Allergies   Allergen Reactions    Vicodin [Hydrocodone-Acetaminophen] Anaphylaxis        1) Call [facility] scheduling [phone]) or [phone] [facility]     PHONE OR   INSTANT MESSAGE  2) PREAUTHORIZATION NUMBER:    Spoke to:      From date:     expiration date:        Cesar Nelson

## 2021-01-06 ENCOUNTER — TELEPHONE (OUTPATIENT)
Dept: CARDIOLOGY CLINIC | Age: 81
End: 2021-01-06

## 2021-01-07 ENCOUNTER — TELEPHONE (OUTPATIENT)
Dept: CARDIOLOGY CLINIC | Age: 81
End: 2021-01-07

## 2021-01-07 NOTE — TELEPHONE ENCOUNTER
Received call from [facility] in the EP lab. She spoke with patient regarding A-Fib ablation scheduled for 1/21. Patient has changed her mind and would rather schedule procedure in February. Spoke with patient. Rescheduled procedure for 2/11/21 at 0730. Patient to have COVID screening, sign procedure paperwork and have pre-testing done on 2/8/21. Provided patient with number to central scheduling to re-schedule cardiac CTA also. Patient voiced understanding.

## 2021-01-12 ENCOUNTER — TELEPHONE (OUTPATIENT)
Dept: CARDIOLOGY CLINIC | Age: 81
End: 2021-01-12

## 2021-01-12 NOTE — TELEPHONE ENCOUNTER
Attempted to reach patient. Patient has already cancelled A-Fib ablation scheduled for 1/21 and rescheduled to 2/11. Unsure if patient is wanting to cancel procedure all together now. No answering machine available to leave message. Will try to reach patient at a later time.

## 2021-01-13 NOTE — TELEPHONE ENCOUNTER
Spoke with patient. Confirmed that patient wants to cancel ablation all together now. She states \"there is too much on my plate, without getting into too much detail. \" She states she will call back if she decides to move forward.

## 2021-02-24 ENCOUNTER — OFFICE VISIT (OUTPATIENT)
Dept: INTERNAL MEDICINE CLINIC | Age: 81
End: 2021-02-24
Payer: MEDICARE

## 2021-02-24 VITALS
BODY MASS INDEX: 24.41 KG/M2 | WEIGHT: 143 LBS | SYSTOLIC BLOOD PRESSURE: 118 MMHG | DIASTOLIC BLOOD PRESSURE: 68 MMHG | HEIGHT: 64 IN | HEART RATE: 60 BPM | OXYGEN SATURATION: 97 %

## 2021-02-24 DIAGNOSIS — I48.0 PAROXYSMAL ATRIAL FIBRILLATION (HCC): Primary | ICD-10-CM

## 2021-02-24 DIAGNOSIS — E78.2 MIXED HYPERLIPIDEMIA: ICD-10-CM

## 2021-02-24 DIAGNOSIS — K44.9 HIATAL HERNIA: ICD-10-CM

## 2021-02-24 DIAGNOSIS — E03.4 HYPOTHYROIDISM DUE TO ACQUIRED ATROPHY OF THYROID: ICD-10-CM

## 2021-02-24 DIAGNOSIS — E53.8 B12 DEFICIENCY: ICD-10-CM

## 2021-02-24 DIAGNOSIS — S62.102S CLOSED FRACTURE OF LEFT WRIST, SEQUELA: ICD-10-CM

## 2021-02-24 DIAGNOSIS — I48.0 PAROXYSMAL ATRIAL FIBRILLATION (HCC): ICD-10-CM

## 2021-02-24 DIAGNOSIS — K21.9 GASTROESOPHAGEAL REFLUX DISEASE WITHOUT ESOPHAGITIS: ICD-10-CM

## 2021-02-24 LAB
A/G RATIO: 1.6 (ref 1.1–2.2)
ALBUMIN SERPL-MCNC: 4.7 G/DL (ref 3.4–5)
ALP BLD-CCNC: 94 U/L (ref 40–129)
ALT SERPL-CCNC: 40 U/L (ref 10–40)
ANION GAP SERPL CALCULATED.3IONS-SCNC: 9 MMOL/L (ref 3–16)
AST SERPL-CCNC: 33 U/L (ref 15–37)
BASOPHILS ABSOLUTE: 0 K/UL (ref 0–0.2)
BASOPHILS RELATIVE PERCENT: 0.5 %
BILIRUB SERPL-MCNC: 0.4 MG/DL (ref 0–1)
BUN BLDV-MCNC: 17 MG/DL (ref 7–20)
CALCIUM SERPL-MCNC: 9.8 MG/DL (ref 8.3–10.6)
CHLORIDE BLD-SCNC: 100 MMOL/L (ref 99–110)
CHOLESTEROL, TOTAL: 182 MG/DL (ref 0–199)
CO2: 29 MMOL/L (ref 21–32)
CREAT SERPL-MCNC: 0.8 MG/DL (ref 0.6–1.2)
EOSINOPHILS ABSOLUTE: 0 K/UL (ref 0–0.6)
EOSINOPHILS RELATIVE PERCENT: 0 %
GFR AFRICAN AMERICAN: >60
GFR NON-AFRICAN AMERICAN: >60
GLOBULIN: 3 G/DL
GLUCOSE BLD-MCNC: 88 MG/DL (ref 70–99)
HCT VFR BLD CALC: 42.6 % (ref 36–48)
HDLC SERPL-MCNC: 61 MG/DL (ref 40–60)
HEMOGLOBIN: 14.5 G/DL (ref 12–16)
LDL CHOLESTEROL CALCULATED: 100 MG/DL
LYMPHOCYTES ABSOLUTE: 1.5 K/UL (ref 1–5.1)
LYMPHOCYTES RELATIVE PERCENT: 36.1 %
MCH RBC QN AUTO: 31.5 PG (ref 26–34)
MCHC RBC AUTO-ENTMCNC: 34.1 G/DL (ref 31–36)
MCV RBC AUTO: 92.4 FL (ref 80–100)
MONOCYTES ABSOLUTE: 0.4 K/UL (ref 0–1.3)
MONOCYTES RELATIVE PERCENT: 10.3 %
NEUTROPHILS ABSOLUTE: 2.2 K/UL (ref 1.7–7.7)
NEUTROPHILS RELATIVE PERCENT: 53.1 %
PDW BLD-RTO: 12.6 % (ref 12.4–15.4)
PLATELET # BLD: 161 K/UL (ref 135–450)
PMV BLD AUTO: 8.6 FL (ref 5–10.5)
POTASSIUM SERPL-SCNC: 4.9 MMOL/L (ref 3.5–5.1)
RBC # BLD: 4.61 M/UL (ref 4–5.2)
SODIUM BLD-SCNC: 138 MMOL/L (ref 136–145)
T4 FREE: 1.8 NG/DL (ref 0.9–1.8)
TOTAL PROTEIN: 7.7 G/DL (ref 6.4–8.2)
TRIGL SERPL-MCNC: 106 MG/DL (ref 0–150)
TSH SERPL DL<=0.05 MIU/L-ACNC: 0.48 UIU/ML (ref 0.27–4.2)
VITAMIN B-12: 653 PG/ML (ref 211–911)
VLDLC SERPL CALC-MCNC: 21 MG/DL
WBC # BLD: 4.2 K/UL (ref 4–11)

## 2021-02-24 PROCEDURE — 1090F PRES/ABSN URINE INCON ASSESS: CPT | Performed by: INTERNAL MEDICINE

## 2021-02-24 PROCEDURE — 4040F PNEUMOC VAC/ADMIN/RCVD: CPT | Performed by: INTERNAL MEDICINE

## 2021-02-24 PROCEDURE — G8420 CALC BMI NORM PARAMETERS: HCPCS | Performed by: INTERNAL MEDICINE

## 2021-02-24 PROCEDURE — 36415 COLL VENOUS BLD VENIPUNCTURE: CPT | Performed by: INTERNAL MEDICINE

## 2021-02-24 PROCEDURE — 99214 OFFICE O/P EST MOD 30 MIN: CPT | Performed by: INTERNAL MEDICINE

## 2021-02-24 PROCEDURE — G8484 FLU IMMUNIZE NO ADMIN: HCPCS | Performed by: INTERNAL MEDICINE

## 2021-02-24 PROCEDURE — G8399 PT W/DXA RESULTS DOCUMENT: HCPCS | Performed by: INTERNAL MEDICINE

## 2021-02-24 PROCEDURE — 1123F ACP DISCUSS/DSCN MKR DOCD: CPT | Performed by: INTERNAL MEDICINE

## 2021-02-24 PROCEDURE — 1036F TOBACCO NON-USER: CPT | Performed by: INTERNAL MEDICINE

## 2021-02-24 PROCEDURE — G8427 DOCREV CUR MEDS BY ELIG CLIN: HCPCS | Performed by: INTERNAL MEDICINE

## 2021-02-24 RX ORDER — PANTOPRAZOLE SODIUM 20 MG/1
20 TABLET, DELAYED RELEASE ORAL
Qty: 90 TABLET | Refills: 1 | Status: SHIPPED | OUTPATIENT
Start: 2021-02-24 | End: 2022-10-13

## 2021-02-24 RX ORDER — LEVOTHYROXINE SODIUM 0.15 MG/1
150 TABLET ORAL DAILY
Qty: 90 TABLET | Refills: 1 | Status: SHIPPED | OUTPATIENT
Start: 2021-02-24 | End: 2021-08-25 | Stop reason: SDUPTHER

## 2021-02-24 ASSESSMENT — PATIENT HEALTH QUESTIONNAIRE - PHQ9
2. FEELING DOWN, DEPRESSED OR HOPELESS: 0
SUM OF ALL RESPONSES TO PHQ QUESTIONS 1-9: 0
SUM OF ALL RESPONSES TO PHQ9 QUESTIONS 1 & 2: 0

## 2021-02-25 NOTE — RESULT ENCOUNTER NOTE
Call pt, labs ok/chol ok- thyroid fxn is nl range as well as B12 level. Thus [facility] does not need any B12 injections.   Lastly, chol is much better dropping from 200-182

## 2021-02-26 ENCOUNTER — OFFICE VISIT (OUTPATIENT)
Dept: CARDIOLOGY CLINIC | Age: 81
End: 2021-02-26
Payer: MEDICARE

## 2021-02-26 VITALS
HEIGHT: 64 IN | BODY MASS INDEX: 24.72 KG/M2 | HEART RATE: 64 BPM | RESPIRATION RATE: 18 BRPM | WEIGHT: 144.8 LBS | OXYGEN SATURATION: 97 % | TEMPERATURE: 97.2 F | SYSTOLIC BLOOD PRESSURE: 118 MMHG | DIASTOLIC BLOOD PRESSURE: 72 MMHG

## 2021-02-26 DIAGNOSIS — I48.0 PAROXYSMAL ATRIAL FIBRILLATION (HCC): Primary | ICD-10-CM

## 2021-02-26 PROCEDURE — 1036F TOBACCO NON-USER: CPT | Performed by: NURSE PRACTITIONER

## 2021-02-26 PROCEDURE — G8420 CALC BMI NORM PARAMETERS: HCPCS | Performed by: NURSE PRACTITIONER

## 2021-02-26 PROCEDURE — 4040F PNEUMOC VAC/ADMIN/RCVD: CPT | Performed by: NURSE PRACTITIONER

## 2021-02-26 PROCEDURE — 99213 OFFICE O/P EST LOW 20 MIN: CPT | Performed by: NURSE PRACTITIONER

## 2021-02-26 PROCEDURE — 1090F PRES/ABSN URINE INCON ASSESS: CPT | Performed by: NURSE PRACTITIONER

## 2021-02-26 PROCEDURE — G8484 FLU IMMUNIZE NO ADMIN: HCPCS | Performed by: NURSE PRACTITIONER

## 2021-02-26 PROCEDURE — 1123F ACP DISCUSS/DSCN MKR DOCD: CPT | Performed by: NURSE PRACTITIONER

## 2021-02-26 PROCEDURE — G8399 PT W/DXA RESULTS DOCUMENT: HCPCS | Performed by: NURSE PRACTITIONER

## 2021-02-26 PROCEDURE — G8427 DOCREV CUR MEDS BY ELIG CLIN: HCPCS | Performed by: NURSE PRACTITIONER

## 2021-02-26 ASSESSMENT — ENCOUNTER SYMPTOMS
SHORTNESS OF BREATH: 0
ORTHOPNEA: 0

## 2021-02-26 NOTE — PROGRESS NOTES
supple,  no carotid bruit appreciated, JVD not appreciated    Respiratory:  Normal breath sounds, No respiratory distress, No wheezing    Cardiovascular:  S1 S2 appreciated, Regular rate, regular rhythm,  no murmur appreciated, No rubs appreciated, No gallops appreciated, No chest tenderness. Extremities:  no edema to the lower extremities    All pertinent data reviewed and discussed with patient including:    Transthoracic echocardiogram : 03/09/2020  Left ventricular systolic function is normal with an ejection fraction of   55-60%. Grade I diastolic dysfunction. Sclerotic, but non-stenotic aortic valve. Right ventricular systolic pressure of 38 mmHg consistent with mild   pulmonary hypertension. No evidence of pericardial effusion. Holter monitor 11/10/21  PREDOMINANT RHYTHM IS SR  SHORT RUNS OF PAF     ASSESSMENT/PLAN:    Atrial fib  Today appears to be in regular rate with regular rhythm. Denies any symptoms from atrial fibrillation. Was evaluated by EP for atrial fib and recommended to start anticoagulation and for Ablation. She is requesting a second opinion from Dr Lucrecia Thao  She is declining anticoagulation   We discussed risk of stroke secondary to a fib  Again she is declining anticoagulation until she gets second opinion  from EP. [id number] Score for Atrial Fibrillation Stroke Risk   Risk   Factors  Component Value   C CHF No 0   H HTN No 0   A2 Age >= 76 Yes,  (78 y.o.) 2   D DM No 0   S2 Prior Stroke/TIA No 0   V Vascular Disease No 0   A Age 74-69 No,  (78 y.o.) 0   Sc Sex female 1    [id number]  Score  3   Score last updated 2/26/21 11:39 AM EST      Medications reviewed and confirmed with patient     Tests ordered:  None     OV in 6 months    Signed:  Imagene Sicard, 2/26/2021, 11:29 AM    An electronic signature was used to authenticate this note.

## 2021-02-26 NOTE — LETTER
Sonia Crockett  1940  [id number]    Have you had any Chest Pain that is not new? - No    Have you had any Shortness of Breath - No    Have you had any dizziness - No    Have you had any palpitations that are not new? - Yes  If Yes DO EKG - Do you feel your heart fluttering  How long does it last - .30  minutes     Is the patient on any of the following medications - Rhythmol (Propafenone)  If Yes DO EKG - Needs done every 6 months    Do you have any edema - swelling in No      Do you have a surgery or procedure scheduled in the near future - No    ? Ask patient if they [facility] if they are not already signed [facility]    ? Check to see if we have an E-MAIL on file for the patient    ? Check medication list thoroughly!!! AND RECONCILE OUTSIDE MEDICATIONS  If dose has changed change the entire order not just the MG  BE SURE TO ASK PATIENT IF THEY NEED MEDICATION REFILLS    ?  At check out add to every patient's \"wrap up\" the following dot phrase AFTERHOURSEDUCATION and ensure we explain this to our patients

## 2021-02-26 NOTE — PATIENT INSTRUCTIONS
Please be informed that if you contact our office outside of normal business hours the physician on call cannot help with any scheduling or rescheduling issues, procedure instruction questions or any type of medication issue. We advise you for any urgent/emergency that you go to the nearest emergency room!     PLEASE CALL OUR OFFICE DURING NORMAL BUSINESS HOURS    Monday - Friday   8 am to 5 pm    [place]: [phone]    Irene:  [phone]

## 2021-03-15 DIAGNOSIS — I48.0 PAROXYSMAL ATRIAL FIBRILLATION (HCC): ICD-10-CM

## 2021-03-15 RX ORDER — METOPROLOL SUCCINATE 25 MG/1
12.5 TABLET, EXTENDED RELEASE ORAL NIGHTLY
Qty: 30 TABLET | Refills: 0
Start: 2021-03-15 | End: 2021-08-25

## 2021-03-15 RX ORDER — PROPAFENONE HYDROCHLORIDE 150 MG/1
150 TABLET, FILM COATED ORAL 2 TIMES DAILY
Qty: 180 TABLET | Refills: 0 | Status: SHIPPED
Start: 2021-03-15 | End: 2021-08-25

## 2021-03-24 DIAGNOSIS — I48.0 PAROXYSMAL ATRIAL FIBRILLATION (HCC): Primary | ICD-10-CM

## 2021-03-24 DIAGNOSIS — R53.83 FATIGUE, UNSPECIFIED TYPE: ICD-10-CM

## 2021-07-22 ENCOUNTER — OFFICE VISIT (OUTPATIENT)
Dept: INTERNAL MEDICINE CLINIC | Age: 81
End: 2021-07-22
Payer: MEDICARE

## 2021-07-22 VITALS
DIASTOLIC BLOOD PRESSURE: 68 MMHG | OXYGEN SATURATION: 90 % | RESPIRATION RATE: 14 BRPM | SYSTOLIC BLOOD PRESSURE: 124 MMHG | HEART RATE: 83 BPM

## 2021-07-22 DIAGNOSIS — R30.0 DYSURIA: Primary | ICD-10-CM

## 2021-07-22 DIAGNOSIS — N30.01 ACUTE CYSTITIS WITH HEMATURIA: ICD-10-CM

## 2021-07-22 LAB
BILIRUBIN, POC: NORMAL
BLOOD URINE, POC: NORMAL
CLARITY, POC: NORMAL
COLOR, POC: YELLOW
GLUCOSE URINE, POC: NORMAL
KETONES, POC: NORMAL
LEUKOCYTE EST, POC: NORMAL
NITRITE, POC: POSITIVE
PH, POC: 6.5
PROTEIN, POC: 100
SPECIFIC GRAVITY, POC: 1.02
UROBILINOGEN, POC: 2

## 2021-07-22 PROCEDURE — 81002 URINALYSIS NONAUTO W/O SCOPE: CPT | Performed by: INTERNAL MEDICINE

## 2021-07-22 PROCEDURE — G8427 DOCREV CUR MEDS BY ELIG CLIN: HCPCS | Performed by: INTERNAL MEDICINE

## 2021-07-22 PROCEDURE — 4040F PNEUMOC VAC/ADMIN/RCVD: CPT | Performed by: INTERNAL MEDICINE

## 2021-07-22 PROCEDURE — 1090F PRES/ABSN URINE INCON ASSESS: CPT | Performed by: INTERNAL MEDICINE

## 2021-07-22 PROCEDURE — 1123F ACP DISCUSS/DSCN MKR DOCD: CPT | Performed by: INTERNAL MEDICINE

## 2021-07-22 PROCEDURE — G8399 PT W/DXA RESULTS DOCUMENT: HCPCS | Performed by: INTERNAL MEDICINE

## 2021-07-22 PROCEDURE — G8420 CALC BMI NORM PARAMETERS: HCPCS | Performed by: INTERNAL MEDICINE

## 2021-07-22 PROCEDURE — 1036F TOBACCO NON-USER: CPT | Performed by: INTERNAL MEDICINE

## 2021-07-22 PROCEDURE — 99213 OFFICE O/P EST LOW 20 MIN: CPT | Performed by: INTERNAL MEDICINE

## 2021-07-22 RX ORDER — CIPROFLOXACIN 250 MG/1
250 TABLET, FILM COATED ORAL 2 TIMES DAILY
Qty: 14 TABLET | Refills: 0 | Status: SHIPPED | OUTPATIENT
Start: 2021-07-22 | End: 2021-07-29

## 2021-07-22 RX ORDER — PHENAZOPYRIDINE HYDROCHLORIDE 100 MG/1
100 TABLET, FILM COATED ORAL 3 TIMES DAILY PRN
Qty: 9 TABLET | Refills: 0 | Status: SHIPPED | OUTPATIENT
Start: 2021-07-22 | End: 2021-07-25

## 2021-07-22 NOTE — PROGRESS NOTES
Cally Alejandro  1940 07/22/21    SUBJECTIVE:  The past week w onset of worsening dysuria, despite cranberry. Some low gr fevers noted at times, better after ibuprofen. Few sweats too. ua poct positive. No blood seen. No LBP    OBJECTIVE:    /68   Pulse 83   Resp 14   SpO2 90%     Physical Exam  Vitals reviewed. Constitutional:       Appearance: She is well-developed. Cardiovascular:      Rate and Rhythm: Normal rate and regular rhythm. Heart sounds: Normal heart sounds. No murmur heard. No friction rub. No gallop. Pulmonary:      Effort: Pulmonary effort is normal. No respiratory distress. Breath sounds: Normal breath sounds. No wheezing or rales. Abdominal:      General: Bowel sounds are normal. There is no distension. Palpations: Abdomen is soft. Tenderness: There is no abdominal tenderness. There is no right CVA tenderness or left CVA tenderness. Musculoskeletal:      Cervical back: Neck supple. Neurological:      Mental Status: She is alert. ASSESSMENT:    1. Dysuria    2. Acute cystitis with hematuria        PLAN:  ua pos, Will rx w atbs as noted, also encouraged pushing fluids. To call back one week if sx persist.    Ghada Cavanuagh was seen today for dysuria. Diagnoses and all orders for this visit:    Dysuria  -     POCT Urinalysis no Micro  -     phenazopyridine (PYRIDIUM) 100 MG tablet; Take 1 tablet by mouth 3 times daily as needed for Pain  -     ciprofloxacin (CIPRO) 250 MG tablet; Take 1 tablet by mouth 2 times daily for 7 days    Acute cystitis with hematuria  -     phenazopyridine (PYRIDIUM) 100 MG tablet; Take 1 tablet by mouth 3 times daily as needed for Pain  -     ciprofloxacin (CIPRO) 250 MG tablet;  Take 1 tablet by mouth 2 times daily for 7 days

## 2021-08-25 ENCOUNTER — OFFICE VISIT (OUTPATIENT)
Dept: INTERNAL MEDICINE CLINIC | Age: 81
End: 2021-08-25
Payer: MEDICARE

## 2021-08-25 VITALS
OXYGEN SATURATION: 98 % | HEART RATE: 80 BPM | RESPIRATION RATE: 14 BRPM | WEIGHT: 139 LBS | SYSTOLIC BLOOD PRESSURE: 126 MMHG | BODY MASS INDEX: 23.73 KG/M2 | DIASTOLIC BLOOD PRESSURE: 70 MMHG | HEIGHT: 64 IN

## 2021-08-25 DIAGNOSIS — E55.9 VITAMIN D DEFICIENCY: ICD-10-CM

## 2021-08-25 DIAGNOSIS — E03.4 HYPOTHYROIDISM DUE TO ACQUIRED ATROPHY OF THYROID: ICD-10-CM

## 2021-08-25 DIAGNOSIS — E78.2 HYPERLIPEMIA, MIXED: ICD-10-CM

## 2021-08-25 DIAGNOSIS — R68.89 FLU-LIKE SYMPTOMS: ICD-10-CM

## 2021-08-25 DIAGNOSIS — Z00.00 ROUTINE GENERAL MEDICAL EXAMINATION AT A HEALTH CARE FACILITY: Primary | ICD-10-CM

## 2021-08-25 DIAGNOSIS — I48.0 PAROXYSMAL ATRIAL FIBRILLATION (HCC): ICD-10-CM

## 2021-08-25 DIAGNOSIS — R26.81 GAIT INSTABILITY: ICD-10-CM

## 2021-08-25 DIAGNOSIS — R68.89 FLU-LIKE SYMPTOMS: Primary | ICD-10-CM

## 2021-08-25 LAB
A/G RATIO: 1.5 (ref 1.1–2.2)
ALBUMIN SERPL-MCNC: 4.5 G/DL (ref 3.4–5)
ALP BLD-CCNC: 92 U/L (ref 40–129)
ALT SERPL-CCNC: 16 U/L (ref 10–40)
ANION GAP SERPL CALCULATED.3IONS-SCNC: 14 MMOL/L (ref 3–16)
AST SERPL-CCNC: 22 U/L (ref 15–37)
BASOPHILS ABSOLUTE: 0 K/UL (ref 0–0.2)
BASOPHILS RELATIVE PERCENT: 0.4 %
BILIRUB SERPL-MCNC: 0.7 MG/DL (ref 0–1)
BUN BLDV-MCNC: 15 MG/DL (ref 7–20)
CALCIUM SERPL-MCNC: 9.5 MG/DL (ref 8.3–10.6)
CHLORIDE BLD-SCNC: 100 MMOL/L (ref 99–110)
CHOLESTEROL, TOTAL: 173 MG/DL (ref 0–199)
CO2: 26 MMOL/L (ref 21–32)
CREAT SERPL-MCNC: 0.8 MG/DL (ref 0.6–1.2)
EOSINOPHILS ABSOLUTE: 0 K/UL (ref 0–0.6)
EOSINOPHILS RELATIVE PERCENT: 0.2 %
GFR AFRICAN AMERICAN: >60
GFR NON-AFRICAN AMERICAN: >60
GLOBULIN: 3 G/DL
GLUCOSE BLD-MCNC: 85 MG/DL (ref 70–99)
HCT VFR BLD CALC: 43.9 % (ref 36–48)
HDLC SERPL-MCNC: 61 MG/DL (ref 40–60)
HEMOGLOBIN: 15 G/DL (ref 12–16)
LDL CHOLESTEROL CALCULATED: 89 MG/DL
LYMPHOCYTES ABSOLUTE: 1.4 K/UL (ref 1–5.1)
LYMPHOCYTES RELATIVE PERCENT: 36.2 %
MAGNESIUM: 2.2 MG/DL (ref 1.8–2.4)
MCH RBC QN AUTO: 31.1 PG (ref 26–34)
MCHC RBC AUTO-ENTMCNC: 34.1 G/DL (ref 31–36)
MCV RBC AUTO: 91.3 FL (ref 80–100)
MONOCYTES ABSOLUTE: 0.4 K/UL (ref 0–1.3)
MONOCYTES RELATIVE PERCENT: 9.6 %
NEUTROPHILS ABSOLUTE: 2.1 K/UL (ref 1.7–7.7)
NEUTROPHILS RELATIVE PERCENT: 53.6 %
PDW BLD-RTO: 13.4 % (ref 12.4–15.4)
PLATELET # BLD: 143 K/UL (ref 135–450)
PMV BLD AUTO: 8.1 FL (ref 5–10.5)
POTASSIUM SERPL-SCNC: 4.8 MMOL/L (ref 3.5–5.1)
RBC # BLD: 4.81 M/UL (ref 4–5.2)
SODIUM BLD-SCNC: 140 MMOL/L (ref 136–145)
T4 FREE: 1.8 NG/DL (ref 0.9–1.8)
TOTAL PROTEIN: 7.5 G/DL (ref 6.4–8.2)
TRIGL SERPL-MCNC: 114 MG/DL (ref 0–150)
TSH SERPL DL<=0.05 MIU/L-ACNC: 1.08 UIU/ML (ref 0.27–4.2)
VITAMIN D 25-HYDROXY: 43.4 NG/ML
VLDLC SERPL CALC-MCNC: 23 MG/DL
WBC # BLD: 3.9 K/UL (ref 4–11)

## 2021-08-25 PROCEDURE — 4040F PNEUMOC VAC/ADMIN/RCVD: CPT | Performed by: INTERNAL MEDICINE

## 2021-08-25 PROCEDURE — 1123F ACP DISCUSS/DSCN MKR DOCD: CPT | Performed by: INTERNAL MEDICINE

## 2021-08-25 PROCEDURE — G0439 PPPS, SUBSEQ VISIT: HCPCS | Performed by: INTERNAL MEDICINE

## 2021-08-25 PROCEDURE — 36415 COLL VENOUS BLD VENIPUNCTURE: CPT | Performed by: INTERNAL MEDICINE

## 2021-08-25 RX ORDER — PROPAFENONE HYDROCHLORIDE 225 MG/1
150 TABLET, FILM COATED ORAL EVERY 8 HOURS
Qty: 135 TABLET | Refills: 1
Start: 2021-08-25 | End: 2022-03-04 | Stop reason: SDUPTHER

## 2021-08-25 RX ORDER — LEVOTHYROXINE SODIUM 0.15 MG/1
150 TABLET ORAL DAILY
Qty: 90 TABLET | Refills: 1 | Status: SHIPPED | OUTPATIENT
Start: 2021-08-25 | End: 2022-03-04 | Stop reason: SDUPTHER

## 2021-08-25 RX ORDER — APIXABAN 5 MG/1
5 TABLET, FILM COATED ORAL 2 TIMES DAILY
COMMUNITY
Start: 2021-06-12 | End: 2022-03-04 | Stop reason: SDUPTHER

## 2021-08-25 ASSESSMENT — PATIENT HEALTH QUESTIONNAIRE - PHQ9
SUM OF ALL RESPONSES TO PHQ QUESTIONS 1-9: 0
SUM OF ALL RESPONSES TO PHQ QUESTIONS 1-9: 0
SUM OF ALL RESPONSES TO PHQ9 QUESTIONS 1 & 2: 0
2. FEELING DOWN, DEPRESSED OR HOPELESS: 0
SUM OF ALL RESPONSES TO PHQ QUESTIONS 1-9: 0
1. LITTLE INTEREST OR PLEASURE IN DOING THINGS: 0

## 2021-08-25 ASSESSMENT — LIFESTYLE VARIABLES: HOW OFTEN DO YOU HAVE A DRINK CONTAINING ALCOHOL: 0

## 2021-08-25 NOTE — PROGRESS NOTES
Medicare Annual Wellness Visit  Name: Fantasma Darling Date: 2021   MRN: [id number] Sex: Female   Age: 78 y.o. Ethnicity: Non- / Non    : 1940 Race: White (non-)      Odin Alvarado is here for Medicare AWV    Screenings for behavioral, psychosocial and functional/safety risks, and cognitive dysfunction are all negative except as indicated below. These results, as well as other patient data from the [address] form, are documented in Flowsheets linked to this Encounter. ATR FIB, no recent palpitations. Stable on eliquis and rythmol. Hypothyroid has been asymptomatic w/o sx of fatigue, constipation, cold intolerance, depression. Lipids:  Is continuing statin therapy and low fat diet. Tolerating medications w/o myalgias or GI upset. NOT DONE COVID VACCINE. THINKS SHE MAY HAVE HAD INFECTION IN THE SPRING, WAS ILL W FLU SX FOR A MONTH. Sometimes can have sl unsteady walk, dizziness and asked for PT eval.    Allergies   Allergen Reactions    Vicodin [Hydrocodone-Acetaminophen] Anaphylaxis       Prior to Visit Medications    Medication Sig Taking? Authorizing Provider   propafenone (RYTHMOL) 225 MG tablet Take 0.5 tablets by mouth every 8 hours Yes Felipe Oliveros MD   ELIQUIS 5 MG TABS tablet Take 5 mg by mouth 2 times daily  Historical Provider, MD   levothyroxine (SYNTHROID) 150 MCG tablet Take 1 tablet by mouth daily  Felipe Oliveros MD   pantoprazole (PROTONIX) 20 MG tablet Take 1 tablet by mouth every morning (before breakfast)  Felipe Oliveros MD   acetaminophen (APAP EXTRA STRENGTH) 500 MG tablet Take 1 tablet by mouth every 6 hours as needed for Pain  TJ Houser   Cyanocobalamin (B-12) 1000 MCG CAPS Take by mouth  Historical Provider, MD   Cholecalciferol (VITAMIN D3) 1000 UNITS TABS Take 1 tablet by mouth daily.   Felipe Oliveros MD       Past Medical History:   Diagnosis Date    AF (atrial fibrillation) (HCC)     Allergic rhinitis due to other allergen     Anxiety     Arthritis     Arthritis of big toe 6/12    Dr Tatiana Arrieta- bilateral, treating w orthotics    B12 deficiency     BCC (basal cell carcinoma), face 8/13/13    L cheek- Dr Shah Manus tunnel syndrome     Congenital prolapse of bladder mucosa     DDD (degenerative disc disease), lumbar     Female incontinence     H/O cardiovascular stress test 1/14/2010, 2/6/2007 1/14/2010-Normal perfusion. Normal LVSF. EF 70%;    H/O echocardiogram 1/7/2010, 2/6/2007 1/7/2010-Mild concentric LVH. LVSF normal. EF =>55%.  H/O echocardiogram 03/09/2020    EF 82-92%, Grade I Diastolic Dysfunction, sclerotic but non stenotic aortic valve, Mild Pulm HTN, no pericardial effusion     Hiatal hernia     noted on UGI 10/2020    History of Doppler ultrasound     8/19/2009-Renal US- Normal    Homocysteinemia ([address]     HX OTHER MEDICAL     8/4/2010-48 HR HOLTER MONITOR-Pred rhythm is sinus. No episodes of sustained ectopy seen. 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 she is to watch for acquired risk factors-Dr Jeremiah White Osteoarthritis of foot, right     Osteoporosis     SHE REFUSED MEDS, FOSAMAX--- 8/21/17    Pain in shoulder     SBE (subacute bacterial endocarditis) prophylaxis candidate     VHD (valvular heart disease)     Severe Tricuspid Regurgitation (11/4/2005);  Mild Mitral regurgitation    Vitamin D deficiency        Past Surgical History:   Procedure Laterality Date    BLADDER SUSPENSION  01/01/2009    Dr René Garcia Bilateral 11/01/2012    Dr Demar Ornelas  01/01/1965    FINGER TRIGGER RELEASE Right     HYSTERECTOMY  10/21/2009    and bladder repair    RECTOCELE REPAIR  2011    Dr. Landers [facility] Left 2013    Dr Tabatha Reilly for [address] [place] [address]  1945    WRIST FRACTURE SURGERY Left 2020    Dr Opal Carrasco       Family History   Problem Relation Age of Onset    Cancer Mother         uterine    Diabetes Mother     Diabetes Father     Other Father          of aneurysm    Diabetes Paternal Grandmother     Clotting Disorder Paternal Uncle     Clotting Disorder Paternal Uncle     Clotting Disorder Paternal Uncle     Cancer Other         liver cancer    Cancer Maternal Aunt         breast cancer    Cancer Maternal Uncle         lung cancer    Clotting Disorder Paternal Aunt     Other Other         strong family history of clotting problems       CareTeam (Including outside providers/suppliers regularly involved in providing care):   Patient Care Team:  Felipe Oliveros MD as PCP - General (Internal Medicine)  Felipe Oliveros MD as PCP - [facility] Provider  Lashawn Ornelas MD as Consulting Physician (Cardiology)  Jesus Crain MD as Consulting Physician (Cardiology)    Wt Readings from Last 3 Encounters:   21 139 lb (63 kg)   21 144 lb 12.8 oz (65.7 kg)   21 143 lb (64.9 kg)     Vitals:    21 1019   BP: 126/70   Pulse: 80   Resp: 14   SpO2: 98%   Weight: 139 lb (63 kg)   Height: 5' 4\" (1.626 m)     Body mass index is 23.86 kg/m². Based upon direct observation of the patient, evaluation of cognition reveals recent and remote memory intact.     General Appearance: alert and oriented to person, place and time, well developed and well- nourished, in no acute distress  Skin: warm and dry, no rash or erythema  Head: normocephalic and atraumatic  Eyes: pupils equal, round, and reactive to light, extraocular eye movements intact, conjunctivae normal  Neck: supple and non-tender without mass, no thyromegaly or thyroid nodules, no cervical lymphadenopathy  Pulmonary/Chest: clear to auscultation bilaterally- no wheezes, rales or rhonchi, normal air movement, no respiratory distress  Cardiovascular: normal rate, regular rhythm, normal S1 and S2, no murmurs, rubs, clicks, or gallops, distal pulses intact, no carotid bruits  Abdomen: soft, non-tender, non-distended, normal bowel sounds, no masses or organomegaly  Extremities: no cyanosis, clubbing or edema  Musculoskeletal: normal range of motion, no joint swelling, deformity or tenderness  Neurologic:   no cranial nerve deficit, gait, coordination and speech normal    Patient's complete Health Risk Assessment and screening values have been reviewed and are found in Flowsheets. The following problems were reviewed today and where indicated follow up appointments were made and/or referrals ordered. Positive Risk Factor Screenings with Interventions:          General Health and ACP:  General  In general, how would you say your health is?: Very Good  In the past 7 days, have you experienced any of the following?  New or Increased Pain, New or Increased Fatigue, Loneliness, Social Isolation, Stress or Anger?: None of These  Do you get the social and emotional support that you need?: Yes  Do you have a Living Will?: Yes  Advance Directives     Power of [facility] Will ACP-Advance Directive ACP-Power of     Not on File Not on File Not on File Not on File      General Health Risk Interventions:  · no issues above        Personalized Preventive Plan   Current Health Maintenance Status  Immunization History   Administered Date(s) Administered    Pneumococcal Conjugate 13-valent (Allison Kiser) 08/21/2017        Health Maintenance   Topic Date Due    COVID-19 Vaccine (1) Never done    DTaP/Tdap/Td vaccine (1 - Tdap) Never done    Shingles Vaccine (1 of 2) Never done    Pneumococcal 65+ years Vaccine (2 of 2 - PPSV23) 08/21/2018    Annual Wellness Visit (AWV)  Never done    Flu vaccine (1) 09/01/2021    TSH testing  02/24/2022    DEXA (modify frequency per FRAX score)  Completed    Hepatitis A vaccine  Aged Out    Hepatitis B vaccine  Aged Out    Hib vaccine  Aged Out    Meningococcal (ACWY) vaccine  Aged Out     Recommendations for [employer] Due: see orders and patient instructions/AVS.  . Recommended screening schedule for the next 5-10 years is provided to the patient in written form: see Patient Sage Smith was seen today for medicare awv. Diagnoses and all orders for this visit:    Routine general medical examination at a health care facility - remains independent, functional and active, no indications/needs for other interventions noted at this time- except as noted below and also findings noted on screening medicare questions. Hypothyroidism due to acquired atrophy of thyroid  - Clinically hypothyroidism appears stable and will continue current dosing, also periodic monitoring of TFTs. -     levothyroxine (SYNTHROID) 150 MCG tablet; Take 1 tablet by mouth daily  -     TSH without Reflex; Future  -     T4, Free; Future  -     TSH without Reflex; Future  -     T4, Free; Future    Paroxysmal atrial fibrillation (HCC)  - Pt clinically w/o evidence of cardiovascular instability, cont regimen. -     propafenone (RYTHMOL) 225 MG tablet; Take 0.5 tablets by mouth every 8 hours  -     Comprehensive Metabolic Panel; Future  -     CBC Auto Differential; Future  -     Magnesium; Future    Vitamin D deficiency - monitoring levels  -     Vitamin D 25 Hydroxy; Future  -     Vitamin D 25 Hydroxy; Future    Hyperlipemia, mixed  - Pt will continue to work on a low fat diet and also exercise, wt loss as appropriate. Will continue periodic monitoring of fasting lipid profile, glucose, liver function. -     Comprehensive Metabolic Panel; Future  -     CBC Auto Differential; Future  -     Lipid Panel; Future  -     Comprehensive Metabolic Panel;  Future  -     CBC Auto Differential; Future  -     Lipid Panel; Future    Flu-like symptoms- scr for prior infection but still advised for vaccine  -     Cancel: Covid-19, Antibody;  Future    Gait instability- for PT eval  -     Elise Robertson [phone]

## 2021-08-25 NOTE — PATIENT INSTRUCTIONS
Personalized Preventive Plan for Antonio Harley - 8/25/2021  Medicare offers a range of preventive health benefits. Some of the tests and screenings are paid in full while other may be subject to a deductible, co-insurance, and/or copay. Some of these benefits include a comprehensive review of your medical history including lifestyle, illnesses that may run in your family, and various assessments and screenings as appropriate. After reviewing your medical record and screening and assessments performed today your provider may have ordered immunizations, labs, imaging, and/or referrals for you. A list of these orders (if applicable) as well as your Preventive Care list are included within your After Visit Summary for your review. Other Preventive Recommendations:    · A preventive eye exam performed by an eye specialist is recommended every 1-2 years to screen for glaucoma; cataracts, macular degeneration, and other eye disorders. · A preventive dental visit is recommended every 6 months. · Try to get at least 150 minutes of exercise per week or 10,000 steps per day on a pedometer . · Order or download the FREE \"Exercise & Physical Activity: Your Everyday Guide\" from [employer]. Call 9-114.606.2947 or search [employer] online. · You need 6647-8483 mg of calcium and 1663-7919 IU of vitamin D per day. It is possible to meet your calcium requirement with diet alone, but a vitamin D supplement is usually necessary to meet this goal.  · When exposed to the sun, use a sunscreen that protects against both UVA and UVB radiation with an SPF of 30 or greater. Reapply every 2 to 3 hours or after sweating, drying off with a towel, or swimming. · Always wear a seat belt when traveling in a car. Always wear a helmet when riding a bicycle or motorcycle.

## 2021-08-26 LAB — SARS-COV-2 ANTIBODY, TOTAL: NEGATIVE

## 2021-08-26 NOTE — RESULT ENCOUNTER NOTE
Call pt, labs ok/chol ok, ALSO THYROID FXN AND VIT D LEVEL. SHE IS NEGATIVE FOR COVID ANTIBODY SO HAS NOT HAD PRIOR INFECTION AND TO REITERATE I DO REC LUCIE PLEASE CONSIDER COVID VACCINE WHICH IS POTENTIALLY LIFE SAVING, BUT AGAIN LEAVE THIS UP TO HER PERSONAL CHOICE.

## 2021-09-13 ENCOUNTER — HOSPITAL ENCOUNTER (OUTPATIENT)
Dept: PHYSICAL THERAPY | Age: 81
Setting detail: THERAPIES SERIES
Discharge: HOME | End: 2021-09-13
Payer: MEDICARE

## 2021-09-13 PROCEDURE — 97112 NEUROMUSCULAR REEDUCATION: CPT

## 2021-09-13 PROCEDURE — 97161 PT EVAL LOW COMPLEX 20 MIN: CPT

## 2021-09-13 PROCEDURE — 97110 THERAPEUTIC EXERCISES: CPT

## 2021-09-13 NOTE — PROGRESS NOTES
Physical Therapy  Initial Assessment  Date: 2021  Patient Name: Gavin Russo  MRN: [id number]  : 1940     Treatment Diagnosis: balance instability, LE weakness    Restrictions       Subjective   General  Chart Reviewed: Yes  Patient assessed for rehabilitation services?: Yes  Referring Practitioner: Dr. Hosea Jacobs  Diagnosis: gait instability  PT Visit Information  PT Insurance Information: [facility]  Subjective  Subjective: 3x falling 0810-1076. Twice falling on the steps and tripped over object that required L wrist surgery. No AD use and prefers not have to. Stumbles throughout the night when getting up but not during the day. Feels fatigue plays a role that causes her drag her feet. Feels he is moving around more slowly. No longer walking due to closure of the wall. Numbness into B feet for a while. OA into B great toes with stiffness with very min pain. Return in 6 months to Dr. Hosea Jacobs. Pain Screening  Patient Currently in Pain: No  Vital Signs  Patient Currently in Pain: No    Vision/Hearing  Vision  Vision: Within Functional Limits  Hearing  Hearing: Within functional limits    Orientation  Orientation  Overall Orientation Status: Within Normal Limits    Social/Functional History  Social/Functional History  Lives With: Spouse  Type of Home: House  Home Layout: One level; Laundry in basement  Home Access:  (1 JANUSZ)  Bathroom Shower/Tub: Tub/Shower unit  ADL Assistance: Independent  Homemaking Assistance: Independent  Ambulation Assistance: Independent  Transfer Assistance: Independent  Active : Yes  Mode of Transportation: Car  Occupation: Retired  Leisure & Hobbies: None    Objective     Observation/Palpation  Posture: Good  Palpation: NA  Observation: R LE compensation with sit to stand. recipricol patern on steps with good technique. Decreased step length with min increase in toe out position.     PROM RLE (degrees)  RLE PROM: WFL  AROM RLE (degrees)  RLE AROM: WFL  RLE General AROM: No pain with all testing. PROM LLE (degrees)  LLE PROM: WFL  AROM LLE (degrees)  LLE AROM : WFL  LLE General AROM: No pain with all testing. Strength RLE  Strength RLE: Exception  Comment: No pain with all testing. R Hip Flexion: 4/5  R Hip Extension: 4/5  R Hip ABduction: 4-/5  R Hip ADduction: 4-/5  R Hip Internal Rotation: 4+/5  R Hip External Rotation: 4+/5  R Knee Flexion: 3+/5  R Knee Extension: 4+/5  Strength LLE  Strength LLE: Exception  Comment: No pain with all tesing. L Hip Flexion: 4/5  L Hip Extension: 4/5  L Hip ABduction: 4-/5  L Hip ADduction: 4-/5  L Hip Internal Rotation: 4+/5  L Hip External Rotation: 4+/5  L Knee Flexion: 3+/5  L Knee Extension: 4+/5  Strength Other  Other: 5x sit to stand: 17.71 sec  without UE assist            Microfit    R/L    Quads:    25/28#                      Hamstrings: 7/4#            6MWT: 724 ft without rest break or increase in pain. Decreased step length throughout. Balance  Tandem Stance R Leg: 3  Tandem Stance L Le  Single Leg Stance R Le  Single Leg Stance L Le  Foam Surface  Eyes Open: 30 seconds  Sway: Minimal  Eyes Closed: 20 seconds  Sway: Moderate  Romberg/Narrow Base of Support  Eyes Open: 30 seconds  Sway: Minimal  Eyes Closed: 6 seconds  Sway: Moderate  Sharpened Romberg/Tandem  Sway: Minimal       Assessment   Conditions Requiring Skilled Therapeutic Intervention  Body structures, Functions, Activity limitations: Decreased functional mobility ; Decreased strength;Decreased ROM; Decreased endurance  Pt is 78year old female with gait instability. Pt now very cautious due to fear of falling and to complete prolonged walking. Pt demo deficits this date that include deconditioning, LE weakness and decreased balance stability. Pt will benefit with PT services with return to physical activity, LE strengthening and neuro re-ed to maximize function.  Patient agrees with established plan of care and assisted in the development of their short term and long term goals. Patient had no adverse reaction with initial treatment and there are no barriers to learning. Demonstrates no mental or cognitive disorder  Treatment Diagnosis: balance instability, LE weakness  Prognosis: Good  Decision Making: Low Complexity  Barriers to Learning: None  REQUIRES PT FOLLOW UP: Yes  Activity Tolerance  Activity Tolerance: Patient Tolerated treatment well         Plan   Plan  Times per week: 1-2  Plan weeks: 5  Specific instructions for Next Treatment: review HEP, LE strengthening targeting quads/hamstrings and hip ABD, balance stability on uneven surfaces and eyes closed and emphasize large movements. Current Treatment Recommendations: Strengthening, ROM, Neuromuscular Re-education, Home Exercise Program, Gait Training, Endurance Training, Balance Training    Goals  Short term goals  Time Frame for Short term goals: Defer to Long Term Goals  Patient Goals   Patient goals : improve stability with all transfers and mobility    Long Term Goals: 5 weeks   Long Term Goal 1: Pt will demo I with HEP/symptom management. Long Term Goal 2: Pt will demo normal gait mechanics with min/no deficits to ease community mobility. Long Term Goal 3: Pt will demo >10 sec with SLS/tandem stance to demo improved balance. Long Term Goal 4: Pt will complete 6MWT to demo improved >850 ft to demo improved mobility. Long Term Goal 5: Pt will demo >5# improved with microfit to demo improved LE strength to ease prolonged activities. Long Term Goal 6: Pt will demo 5x sit to stand <15 sec to demo improved LE strength and ease with transfers.        Rita Ga, PT, DPT, OCS     9/13/2021 12:42 PM

## 2021-09-13 NOTE — FLOWSHEET NOTE
Outpatient Physical Therapy  [place]           [x] Phone: [phone]   Fax: [phone]  [facility]           [] Phone: [phone]   Fax: [phone]        Physical Therapy Daily Treatment Note  Date:  2021    Patient Name:  Antonio Harley    :  1940  MRN: [id number]  Restrictions/Precautions:    Diagnosis:    gait instability   Date of Injury/Surgery:   Treatment Diagnosis:  balance instability, LE weakness     Insurance/Certification information:   [facility] Medicare   Referring Physician:  Dr. Lisa Montes           Next Doctor Visit:    Plan of care signed (Y/N):  N, sent 21  Outcome Measure:   Visit# / total visits:   1/5-10 per POC  Pain level: 0/10   Goals:         Short term goals  Time Frame for Short term goals: Defer to Long Term Goals  Patient Goals   Patient goals : improve stability with all transfers and mobility    Long Term Goals: 5 weeks   Long Term Goal 1: Pt will demo I with HEP/symptom management. Long Term Goal 2: Pt will demo normal gait mechanics with min/no deficits to ease community mobility. Long Term Goal 3: Pt will demo >10 sec with SLS/tandem stance to demo improved balance. Long Term Goal 4: Pt will complete 6MWT to demo improved >850 ft to demo improved mobility. Long Term Goal 5: Pt will demo >5# improved with microfit to demo improved LE strength to ease prolonged activities. Long Term Goal 6: Pt will demo 5x sit to stand <15 sec to demo improved LE strength and ease with transfers. Summary of Evaluation:  Pt is 78year old female with gait instability. Pt now very cautious due to fear of falling and to complete prolonged walking. Pt demo deficits this date that include deconditioning, LE weakness and decreased balance stability. Pt will benefit with PT services with return to physical activity, LE strengthening and neuro re-ed to maximize function.  Patient agrees with established plan of care and assisted in the development of their short term long term goals. Patient had no adverse reaction with initial treatment and there are no barriers to learning. Demonstrates no mental or cognitive disorder      Plan for Next Session:  review HEP, LE strengthening targeting quads/hamstrings and hip ABD, balance stability on uneven surfaces and eyes closed and emphasize large movements. Time In / Time Out:   [id number]        If BWC Please Indicate Time In/Out/Total Time  CPT Code Time in Time out Total Time                                                            Total for session             Timed Code/Total Treatment Minutes:  25/60'      15' neuro.  10' TE, 1 PT eval       Next Progress Note due:  Eval 9/13/21   Visit 10       Plan of Care Interventions:  [x] Therapeutic Exercise  [x] Modalities:  [x] Therapeutic Activity     [] Ultrasound  [] Estim  [x] Gait Training      [] Cervical Traction [] Lumbar Traction  [x] Neuromuscular Re-education    [] Cold/hotpack [] Iontophoresis   [x] Instruction in HEP      [] Vasopneumatic   [] Dry Needling    [x] Manual Therapy               [] Aquatic Therapy              Electronically signed by:  Pierce Emmanuel PT, DPT, OCS  9/13/2021, 7:29 AM    9/13/2021 7:29 AM

## 2021-09-13 NOTE — PLAN OF CARE
Outpatient Physical Therapy           [place]           [] Phone: [phone]   Fax: [phone]  [facility]           [] Phone: [phone]   Fax: [phone]     To: Referring Practitioner: Dr. Greer Greenwood  From: Marisue Denver, PT, , DPT, OCS   Patient: Tyree Thomas       : 1940  Diagnosis: Diagnosis: gait instability   Treatment Diagnosis: Treatment Diagnosis: balance instability, LE weakness   Date: 2021    Physical Therapy Certification/Re-Certification Form  Dear Dr. Greer Greenwood  The following patient has been evaluated for physical therapy services and for therapy to continue, insurance requires physician review of the treatment plan initially and every 90 days. Please review the attached evaluation and/or summary of the patient's plan of care, and verify that you agree therapy should continue by signing the attached document and sending it back to our office. Assessment:      Pt is 78year old female with gait instability. Pt now very cautious due to fear of falling and to complete prolonged walking. Pt demo deficits this date that include deconditioning, LE weakness and decreased balance stability. Pt will benefit with PT services with return to physical activity, LE strengthening and neuro re-ed to maximize function. Patient agrees with established plan of care and assisted in the development of their short term and long term goals. Patient had no adverse reaction with initial treatment and there are no barriers to learning.  Demonstrates no mental or cognitive disorder    Plan of Care/Treatment to date:  [x] Therapeutic Exercise  [x] Modalities:  [x] Therapeutic Activity     [] Ultrasound  [] Electrical Stimulation  [x] Gait Training      [] Cervical Traction [] Lumbar Traction  [x] Neuromuscular Re-education    [] Cold/hotpack [] Iontophoresis   [x] Instruction in HEP      [] Vasopneumatic    [] Dry Needling  [x] Manual Therapy               [] Aquatic Therapy       Other: Frequency/Duration:  # Days per week: [x] 1 day # Weeks: [] 1 week [x] 5 weeks     [x] 2 days   [] 2 weeks [] 6 weeks     [] 3 days   [] 3 weeks [] 7 weeks     [] 4 days   [] 4 weeks [] 8 weeks         [] 9 weeks [] 10 weeks         [] 11 weeks [] 12 weeks    Rehab Potential/Progress: [] Excellent [x] Good [] Fair  [] Poor     Goals:    Patient goals : improve stability with all transfers and mobility  Short term goals  Time Frame for Short term goals: Defer to Long Term Goals    Short term goals  Time Frame for Short term goals: Defer to Long Term Goals  Patient Goals   Patient goals : improve stability with all transfers and mobility    Long Term Goals: 5 weeks   Long Term Goal 1: Pt will demo I with HEP/symptom management. Long Term Goal 2: Pt will demo normal gait mechanics with min/no deficits to ease community mobility. Long Term Goal 3: Pt will demo >10 sec with SLS/tandem stance to demo improved balance. Long Term Goal 4: Pt will complete 6MWT to demo improved >850 ft to demo improved mobility. Long Term Goal 5: Pt will demo >5# improved with microfit to demo improved LE strength to ease prolonged activities. Long Term Goal 6: Pt will demo 5x sit to stand <15 sec to demo improved LE strength and ease with transfers. Electronically signed by:  Artur Stone, PT, DPT, OCS  9/13/2021, 12:45 PM    9/13/2021 12:45 PM         If you have any questions or concerns, please don't hesitate to call.   Thank you for your referral.      Physician Signature:________________________________Date:_________ TIME: _____  By signing above, therapists plan is approved by physician

## 2021-09-21 ENCOUNTER — HOSPITAL ENCOUNTER (OUTPATIENT)
Dept: PHYSICAL THERAPY | Age: 81
Setting detail: THERAPIES SERIES
Discharge: HOME | End: 2021-09-21
Payer: MEDICARE

## 2021-09-21 PROCEDURE — 97530 THERAPEUTIC ACTIVITIES: CPT

## 2021-09-21 PROCEDURE — 97112 NEUROMUSCULAR REEDUCATION: CPT

## 2021-09-21 PROCEDURE — 97110 THERAPEUTIC EXERCISES: CPT

## 2021-09-21 NOTE — FLOWSHEET NOTE
Outpatient Physical Therapy  [place]           [x] Phone: [phone]   Fax: [phone]  [facility]           [] Phone: [phone]   Fax: [phone]        Physical Therapy Daily Treatment Note  Date:  2021    Patient Name:  Tonie Oliver    :  1940  MRN: [id number]  Restrictions/Precautions:    Diagnosis:    gait instability   Date of Injury/Surgery:   Treatment Diagnosis:  balance instability, LE weakness     Insurance/Certification information:   [place] [employer]   Referring Physician:  Dr. Eh Fowler           Next Doctor Visit:    Plan of care signed (Y/N):  Y, sent 21  Outcome Measure:   Visit# / total visits:   2/5-10 per POC  Pain level: 0/10   Goals:         Short term goals  Time Frame for Short term goals: Defer to Long Term Goals  Patient Goals   Patient goals : improve stability with all transfers and mobility    Long Term Goals: 5 weeks   Long Term Goal 1: Pt will demo I with HEP/symptom management. Long Term Goal 2: Pt will demo normal gait mechanics with min/no deficits to ease community mobility. Long Term Goal 3: Pt will demo >10 sec with SLS/tandem stance to demo improved balance. Long Term Goal 4: Pt will complete 6MWT to demo improved >850 ft to demo improved mobility. Long Term Goal 5: Pt will demo >5# improved with microfit to demo improved LE strength to ease prolonged activities. Long Term Goal 6: Pt will demo 5x sit to stand <15 sec to demo improved LE strength and ease with transfers. Summary of Evaluation:  Pt is 78year old female with gait instability. Pt now very cautious due to fear of falling and to complete prolonged walking. Pt demo deficits this date that include deconditioning, LE weakness and decreased balance stability. Pt will benefit with PT services with return to physical activity, LE strengthening and neuro re-ed to maximize function.  Patient agrees with established plan of care and assisted in the development of their short term and long term goals. Patient had no adverse reaction with initial treatment and there are no barriers to learning. Demonstrates no mental or cognitive disorder        Subjective:  Pt stated that she wasn't having any pain currently. Pt stated that she has been doing her HEP, but that she has struggled with doing them exactly the way [facility] [place] wanted them down. Pt stated that she is sore after doing the exercises, though. Pt denies any falls or LOB since last visit. Any changes in Ambulatory Summary Sheet? None         Objective:    COVID screening questions were asked and patient attested that there had been no contact or symptoms  R LE is weaker than L LE  Able to complete 10 repetitive STS without a break today. Exercises: (No more than 4 columns)   Exercise/Equipment 9/13/21 #1 9/21/2021 #2            WARM UP        Nu step    L-2 x 5'           TABLE      *Sitting hamstring curls  YTB 10x2 YTB 10x2     SLR with resistance   YTB 10x2 ea LE    Bridge with SB  10x    10x2 w/o SB    Clam shells  YTB 10x2                STANDING      *STS 5x2 10x2     Resisted lateral stepping       Hip ext   10x2 ea LE  W/ UE support                                  PROPRIOCEPTION      *Tandem stance with one finger   15\" x2 ea LE    SLS      EO/EC on airex       Step taps   4\" 10x2    Step over foam AP/lateral       MODALITIES                      Other Therapeutic Activities/Education:  Patient received education on their current pathology and how their condition effects them with their functional activities. Patient understood discussion and questions were answered. Patient understands their activity limitations and understands rational for treatment progression. Home Exercise Program:  HO issued, reviewed and discussed with patient. Pt agreed to comply. Reviewed current HEP and answered questions re: the HEP.        Manual Treatments: --       Modalities:  --      Communication with other providers:  POC sent 9/13/21      Assessment:  (Response towards treatment session) (Pain Rating) Pt tolerated treatment fairly well. Pt was able to increase activity in the gym today. Pt reported fatigue,but no pain after treatment. Pt will continue to benefit from more strengthening and balance     Pt is 78year old female with gait instability. Pt now very cautious due to fear of falling and to complete prolonged walking. Pt demo deficits this date that include deconditioning, LE weakness and decreased balance stability. Pt will benefit with PT services with return to physical activity, LE strengthening and neuro re-ed to maximize function. Patient agrees with established plan of care and assisted in the development of their short term and long term goals. Patient had no adverse reaction with initial treatment and there are no barriers to learning. Demonstrates no mental or cognitive disorder      Plan for Next Session:  review HEP, LE strengthening targeting quads/hamstrings and hip ABD, balance stability on uneven surfaces and eyes closed and emphasize large movements.       Time In / Time Out:   1112/1205       If [facility] Please Indicate Time In/Out/Total Time  CPT Code Time in Time out Total Time                                                            Total for session             Timed Code/Total Treatment Minutes:  53'/53'   1 TA ( (10') 2 TE (30') 1 neuro (13')       Next Progress Note due:  Eval 9/13/21   Visit 10       Plan of Care Interventions:  [x] Therapeutic Exercise  [x] Modalities:  [x] Therapeutic Activity     [] Ultrasound  [] Estim  [x] Gait Training      [] Cervical Traction [] Lumbar Traction  [x] Neuromuscular Re-education    [] Cold/hotpack [] Iontophoresis   [x] Instruction in HEP      [] Vasopneumatic   [] Dry Needling    [x] Manual Therapy               [] Aquatic Therapy              Electronically signed by: Sia Wilson PTA   09/21/21   10:44 AM         9/21/2021,5:06 PM

## 2021-09-28 ENCOUNTER — HOSPITAL ENCOUNTER (OUTPATIENT)
Dept: PHYSICAL THERAPY | Age: 81
Setting detail: THERAPIES SERIES
Discharge: HOME | End: 2021-09-28
Payer: MEDICARE

## 2021-09-28 PROCEDURE — 97110 THERAPEUTIC EXERCISES: CPT

## 2021-09-28 PROCEDURE — 97112 NEUROMUSCULAR REEDUCATION: CPT

## 2021-09-28 PROCEDURE — 97530 THERAPEUTIC ACTIVITIES: CPT

## 2021-09-28 NOTE — FLOWSHEET NOTE
Outpatient Physical Therapy  [place]           [x] Phone: [phone]   Fax: [phone]  [facility]           [] Phone: [phone]   Fax: [phone]        Physical Therapy Daily Treatment Note  Date:  2021    Patient Name:  Lawanda Garza    :  1940  MRN: [id number]  Restrictions/Precautions:    Diagnosis:    gait instability   Date of Injury/Surgery:   Treatment Diagnosis:  balance instability, LE weakness     Insurance/Certification information:   [facility] Medicare   Referring Physician:  Dr. Florence Lutz           Next Doctor Visit:    Plan of care signed (Y/N):  Y, sent 21  Outcome Measure:   Visit# / total visits:   3/5-10 per POC  Pain level: 0/10   Goals:         Short term goals  Time Frame for Short term goals: Defer to Long Term Goals  Patient Goals   Patient goals : improve stability with all transfers and mobility    Long Term Goals: 5 weeks   Long Term Goal 1: Pt will demo I with HEP/symptom management. Long Term Goal 2: Pt will demo normal gait mechanics with min/no deficits to ease community mobility. Long Term Goal 3: Pt will demo >10 sec with SLS/tandem stance to demo improved balance. Long Term Goal 4: Pt will complete 6MWT to demo improved >850 ft to demo improved mobility. Long Term Goal 5: Pt will demo >5# improved with microfit to demo improved LE strength to ease prolonged activities. Long Term Goal 6: Pt will demo 5x sit to stand <15 sec to demo improved LE strength and ease with transfers. Summary of Evaluation:  Pt is 78year old female with gait instability. Pt now very cautious due to fear of falling and to complete prolonged walking. Pt demo deficits this date that include deconditioning, LE weakness and decreased balance stability. Pt will benefit with PT services with return to physical activity, LE strengthening and neuro re-ed to maximize function.  Patient agrees with established plan of care and assisted in the development of their short term and long term goals. Patient had no adverse reaction with initial treatment and there are no barriers to learning. Demonstrates no mental or cognitive disorder        Subjective:  Pt reports of no recent falls but does feel unsteady at times. Any changes in Ambulatory Summary Sheet? None         Objective:    COVID screening questions were asked and patient attested that there had been no contact or symptoms  R LE is weaker than L LE  Able to complete 10 repetitive STS without a break. Most balance activities required single UE support with close SBA. Exercises: (No more than 4 columns)   Exercise/Equipment 9/13/21 #1 9/21/2021 #2 9/28/21 #3           WARM UP        Nu step    L-2 x 5'  WL 3 x 5 min         TABLE      *Sitting hamstring curls  YTB 10x2 YTB 10x2  YTB 2x10 R/L   SLR with resistance   YTB 10x2 ea LE 2x10 R/L no band   Bridge with SB  10x    10x2 w/o SB 2x10 feet on table   Clam shells  YTB 10x2    S/L'ing YTB 1x10 R/L            STANDING      *STS 5x2 10x2  10x2 w/o hands 22\" tall mat table   Resisted lateral stepping       Hip ext   10x2 ea LE  W/ UE support  10x2 R/L with UE support                                PROPRIOCEPTION      *Tandem stance with one finger   15\" x2 ea LE 15\" x2 ea LE single UE support   SLS      EO/EC on airex    airex wt shifts f/b,s/s 10x ea; Feet together 30 sec    Step taps   4\" 10x2 4\" 10x2 single UE support   Step over foam AP/lateral    Lateral 10x with single UE support   MODALITIES                      Other Therapeutic Activities/Education:  Patient received education on their current pathology and how their condition effects them with their functional activities. Patient understood discussion and questions were answered. Patient understands their activity limitations and understands rational for treatment progression. Home Exercise Program:  HO issued, reviewed and discussed with patient. Pt agreed to comply.   Reviewed current HEP and answered questions re: the HEP. Manual Treatments: --       Modalities:  --      Communication with other providers:  POC sent 9/13/21      Assessment:  (Response towards treatment session) (Pain Rating) Pt demonstrated overall good tolerance to today's session without any adverse reaction. Pt reported fatigue but no pain after treatment. Pt will continue to benefit from more strengthening and balance  End pain 0/10     Pt is 78year old female with gait instability. Pt now very cautious due to fear of falling and to complete prolonged walking. Pt demo deficits this date that include deconditioning, LE weakness and decreased balance stability. Pt will benefit with PT services with return to physical activity, LE strengthening and neuro re-ed to maximize function. Patient agrees with established plan of care and assisted in the development of their short term and long term goals. Patient had no adverse reaction with initial treatment and there are no barriers to learning. Demonstrates no mental or cognitive disorder      Plan for Next Session:  review HEP, LE strengthening targeting quads/hamstrings and hip ABD, balance stability on uneven surfaces and eyes closed and emphasize large movements.       Time In / Time Out:  0919/1030       If [facility] Please Indicate Time In/Out/Total Time  CPT Code Time in Time out Total Time                                                            Total for session             Timed Code/Total Treatment Minutes: 43/43  , (1) TE, (1) TA, (1) NR      Next Progress Note due:  Jeseniaal 9/13/21   Visit 10       Plan of Care Interventions:  [x] Therapeutic Exercise  [x] Modalities:  [x] Therapeutic Activity     [] Ultrasound  [] Estim  [x] Gait Training      [] Cervical Traction [] Lumbar Traction  [x] Neuromuscular Re-education    [] Cold/hotpack [] Iontophoresis   [x] Instruction in HEP      [] Vasopneumatic   [] Dry Needling    [x] Manual Therapy               [] Aquatic Therapy Electronically signed by: Wil Vieira, KAVEH   09/28/21   9:47 AM         9/28/2021,9:47 AM

## 2021-10-05 ENCOUNTER — HOSPITAL ENCOUNTER (OUTPATIENT)
Dept: PHYSICAL THERAPY | Age: 81
Setting detail: THERAPIES SERIES
Discharge: HOME | End: 2021-10-05
Payer: MEDICARE

## 2021-10-05 PROCEDURE — 97530 THERAPEUTIC ACTIVITIES: CPT

## 2021-10-05 PROCEDURE — 97112 NEUROMUSCULAR REEDUCATION: CPT

## 2021-10-05 NOTE — FLOWSHEET NOTE
Outpatient Physical Therapy  [place]           [x] Phone: [phone]   Fax: [phone]  [facility]           [] Phone: [phone]   Fax: [phone]        Physical Therapy Daily Treatment Note  Date:  10/5/2021    Patient Name:  Bryan Pedroza    :  1940  MRN: [id number]  Restrictions/Precautions:    Diagnosis:    gait instability   Date of Injury/Surgery:   Treatment Diagnosis:  balance instability, LE weakness     Insurance/Certification information:   [facility]   Referring Physician:  Dr. Shanita Holm           Next Doctor Visit:    Plan of care signed (Y/N):  Y, sent 21  Outcome Measure:   Visit# / total visits:   4/5-10 per POC  Pain level: 0/10   Goals:         Short term goals  Time Frame for Short term goals: Defer to Long Term Goals  Patient Goals   Patient goals : improve stability with all transfers and mobility    Long Term Goals: 5 weeks   Long Term Goal 1: Pt will demo I with HEP/symptom management. Long Term Goal 2: Pt will demo normal gait mechanics with min/no deficits to ease community mobility. MET   Long Term Goal 3: Pt will demo >10 sec with SLS/tandem stance to demo improved balance. Long Term Goal 4: Pt will complete 6MWT to demo improved >850 ft to demo improved mobility. Long Term Goal 5: Pt will demo >5# improved with microfit to demo improved LE strength to ease prolonged activities. Long Term Goal 6: Pt will demo 5x sit to stand <15 sec to demo improved LE strength and ease with transfers. Summary of Evaluation:  Pt is 78year old female with gait instability. Pt now very cautious due to fear of falling and to complete prolonged walking. Pt demo deficits this date that include deconditioning, LE weakness and decreased balance stability. Pt will benefit with PT services with return to physical activity, LE strengthening and neuro re-ed to maximize function.  Patient agrees with established plan of care and assisted in the development of their short term and long term goals. Patient had no adverse reaction with initial treatment and there are no barriers to learning. Demonstrates no mental or cognitive disorder        Subjective:  Pt reports of no recent falls but does feel unsteady at times. Any changes in Ambulatory Summary Sheet? None         Objective:    COVID screening questions were asked and patient attested that there had been no contact or symptoms  R LE is weaker than L LE  Able to complete 10 repetitive STS without a break. balance activities without UE assistance except tandem on beam , SBA as needed. Exercises: (No more than 4 columns)   Exercise/Equipment 9/13/21 #1 9/21/2021 #2 9/28/21 #3 10/5/21   #4            WARM UP         Nu step    L-2 x 5'  WL 3 x 5 min Lv5   5'           TABLE       *Sitting hamstring curls  YTB 10x2 YTB 10x2  YTB 2x10 R/L    SLR with resistance   YTB 10x2 ea LE 2x10 R/L no band    Bridge with SB  10x    10x2 w/o SB 2x10 feet on table    Clam shells  YTB 10x2    S/L'ing YTB 1x10 R/L              STANDING       *STS 5x2 10x2  10x2 w/o hands 22\" tall mat table    Resisted lateral stepping        Hip ext   10x2 ea LE  W/ UE support  10x2 R/L with UE support    Shuttle LE press     R LE 10x2  2c    FM bwd stepping     13# 5x   Lateral step up     8in 10x2               PROPRIOCEPTION       *Tandem stance with one finger   15\" x2 ea LE 15\" x2 ea LE single UE support On beam 4 x   DLS    BOSU 42\"D3    SLS       EO/EC on airex    airex wt shifts f/b,s/s 10x ea; Feet together 30 sec  20\"x2 each dir    Step taps   4\" 10x2 4\" 10x2 single UE support 6in cone 20x2    Step over foam AP/lateral    Lateral 10x with single UE support Green foam on 2in step  no support 10x2   MODALITIES                         Other Therapeutic Activities/Education:  --      Home Exercise Program:  HO issued, reviewed and discussed with patient. Pt agreed to comply. Reviewed current HEP and answered questions re: the HEP.        Manual Traction  [x] Neuromuscular Re-education    [] Cold/hotpack [] Iontophoresis   [x] Instruction in HEP      [] Vasopneumatic   [] Dry Needling    [x] Manual Therapy               [] Aquatic Therapy              Electronically signed by: Veronica Pereyra, PT, DPT, OCS    10/5/2021 7:47 AM

## 2021-10-12 ENCOUNTER — HOSPITAL ENCOUNTER (OUTPATIENT)
Dept: PHYSICAL THERAPY | Age: 81
Setting detail: THERAPIES SERIES
Discharge: HOME | End: 2021-10-12
Payer: MEDICARE

## 2021-10-12 PROCEDURE — 97110 THERAPEUTIC EXERCISES: CPT

## 2021-10-12 PROCEDURE — 97112 NEUROMUSCULAR REEDUCATION: CPT

## 2021-10-12 NOTE — FLOWSHEET NOTE
Outpatient Physical Therapy  [place]           [x] Phone: [phone]   Fax: [phone]  [place] [place]           [] Phone: [phone]   Fax: [phone]        Physical Therapy Daily Treatment Note  Date:  10/12/2021    Patient Name:  Teresa Livingston    :  1940  MRN: [id number]  Restrictions/Precautions:    Diagnosis:    gait instability   Date of Injury/Surgery:   Treatment Diagnosis:  balance instability, LE weakness     Insurance/Certification information:   [facility] Medicare   Referring Physician:  Dr. Koffi Mason           Next Doctor Visit:    Plan of care signed (Y/N):  Y, sent 21  Outcome Measure:   Visit# / total visits:   5/5-10 per POC  Pain level:  /10   Goals:         Short term goals  Time Frame for Short term goals: Defer to Long Term Goals  Patient Goals   Patient goals : improve stability with all transfers and mobility    Long Term Goals: 5 weeks   Long Term Goal 1: Pt will demo I with HEP/symptom management. Long Term Goal 2: Pt will demo normal gait mechanics with min/no deficits to ease community mobility. MET   Long Term Goal 3: Pt will demo >10 sec with SLS/tandem stance to demo improved balance. Long Term Goal 4: Pt will complete 6MWT to demo improved >850 ft to demo improved mobility. Long Term Goal 5: Pt will demo >5# improved with microfit to demo improved LE strength to ease prolonged activities. MET  Long Term Goal 6: Pt will demo 5x sit to stand <15 sec to demo improved LE strength and ease with transfers. Mostly MET        Summary of Evaluation:  Pt is 78year old female with gait instability. Pt now very cautious due to fear of falling and to complete prolonged walking. Pt demo deficits this date that include deconditioning, LE weakness and decreased balance stability. Pt will benefit with PT services with return to physical activity, LE strengthening and neuro re-ed to maximize function.  Patient agrees with established plan of care and assisted in the development of their short term and long term goals. Patient had no adverse reaction with initial treatment and there are no barriers to learning. Demonstrates no mental or cognitive disorder        Subjective:  Pt reports of no recent falls but does feel unsteady at times. Understanding that the unsteady sensations are normal but recovery is most important. No issues after last visit. Any changes in Ambulatory Summary Sheet? None         Objective:    COVID screening questions were asked and patient attested that there had been no contact or symptoms    5x: 15.41 sec without UE assistance. Microfit    R/L    Quads:    35/32#                      Hamstrings: 15/14#        Min assist required for stool drags due to weakness              Exercises: (No more than 4 columns)   Exercise/Equipment 9/13/21 #1 9/21/2021 #2 9/28/21 #3 10/5/21   #4 10/12/21  #5             WARM UP          Nu step    L-2 x 5'  WL 3 x 5 min Lv5   5'  Lv5   4'            TABLE        *Sitting hamstring curls  YTB 10x2 YTB 10x2  YTB 2x10 R/L     SLR with resistance   YTB 10x2 ea LE 2x10 R/L no band     Bridge with SB  10x    10x2 w/o SB 2x10 feet on table     Clam shells  YTB 10x2    S/L'ing YTB 1x10 R/L     Stool drags     10x2 with min assist               STANDING        *STS 5x2 10x2  10x2 w/o hands 22\" tall mat table  Test    Resisted lateral stepping         Hip ext   10x2 ea LE  W/ UE support  10x2 R/L with UE support     Shuttle LE press     R LE 10x2  2c     FM bwd stepping     13# 5x 13# 5x    7# 3x laterally   Lateral step up     8in 10x2 6in 10x2                        PROPRIOCEPTION        *Tandem stance with one finger   15\" x2 ea LE 15\" x2 ea LE single UE support On beam 4 x On beam 4 x   DLS    BOSU 41\"C2  BOSU 30\"x2   SLS        EO/EC on airex    airex wt shifts f/b,s/s 10x ea;   Feet together 30 sec  20\"x2 each dir     Step taps   4\" 10x2 4\" 10x2 single UE support 6in cone 20x2  12in  20x2   Step over foam AP/lateral Lateral 10x with single UE support Green foam on 2in step  no support 10x2 Green foam on 2in step  no support 10x2   MODALITIES                            Other Therapeutic Activities/Education:  --      Home Exercise Program:  HO issued, reviewed and discussed with patient. Pt agreed to comply. Reviewed current HEP and answered questions re: the HEP. Manual Treatments: --       Modalities:  --      Communication with other providers:  POC sent 9/13/21      Assessment:  (Response towards treatment session) (Pain Rating) Pt demonstrated overall good tolerance to today's session without any adverse reaction. Able to progress to higher level dynamic exercises and tolerated well. Min assist for recovery but demo appropriate righting reactions and able to step over large objects without UE assist. Benefit to continue closed chain strengthening to improve functional safety and ease. Will return in 1-2 weeks with PN with anticipated discharge with home program. Pt reported fatigue but no pain after treatment. Pt will continue to benefit from more strengthening and balance    Fatigue without pain post tx. Pt is 78year old female with gait instability. Pt now very cautious due to fear of falling and to complete prolonged walking. Pt demo deficits this date that include deconditioning, LE weakness and decreased balance stability. Pt will benefit with PT services with return to physical activity, LE strengthening and neuro re-ed to maximize function. Patient agrees with established plan of care and assisted in the development of their short term and long term goals. Patient had no adverse reaction with initial treatment and there are no barriers to learning. Demonstrates no mental or cognitive disorder      Plan for Next Session:   LE strengthening targeting quads/hamstrings and hip ABD, balance stability on uneven surfaces and eyes closed and emphasize large movements.       Time In / Time Out:  4454-2807       If C Please Indicate Time In/Out/Total Time  CPT Code Time in Time out Total Time                                                            Total for session             Timed Code/Total Treatment Minutes: 44/44'     (1) TE 10', (2) [address]'  NR      Next Progress Note due:  Eval 9/13/21   Visit 10       Plan of Care Interventions:  [x] Therapeutic Exercise  [x] Modalities:  [x] Therapeutic Activity     [] Ultrasound  [] Estim  [x] Gait Training      [] Cervical Traction [] Lumbar Traction  [x] Neuromuscular Re-education    [] Cold/hotpack [] Iontophoresis   [x] Instruction in HEP      [] Vasopneumatic   [] Dry Needling    [x] Manual Therapy               [] Aquatic Therapy              Electronically signed by: Wero Baum PT, DPT, OCS    10/12/2021 7:39 AM

## 2021-11-30 NOTE — DISCHARGE SUMMARY
Outpatient Physical Therapy           [place]           [] Phone: [phone]   Fax: [phone]  [place]           [] Phone: [phone]   Fax: [phone]      To:    Dr. Emily Barrett     From: Glenda Lambert, PT, DPT, OCS    Patient: Jarad Felix                  : 1940  Diagnosis:       gait instability    Date: 2021  Treatment Diagnosis:    balance instability, LE weakness          []  Progress Note                [x]  Discharge Note    Evaluation Date:   21  Total Visits to date:  5 Cancels/No-shows to date: 0     Subjective:  Per note on 10/12/21  Pt reports of no recent falls but does feel unsteady at times. Understanding that the unsteady sensations are normal but recovery is most important. No issues after last visit. Plan of Care/Treatment to date:  [x] Therapeutic Exercise    [] Modalities:  [x] Therapeutic Activity     [] Ultrasound  [] Electrical Stimulation  [x] Gait Training      [] Cervical Traction   [] Lumbar Traction  [x] Neuromuscular Re-education  [] Cold/hotpack [] Iontophoresis  [x] Instruction in HEP      Other:  [] Manual Therapy       []  Vasopneumatic  [] Aquatic Therapy       []   Dry Needle Therapy                      Objective/Significant Findings At Last Visit/Comments:  Per note on 10/12/21  5x: 15.41 sec without UE assistance.      Microfit    R/L    Quads:    35/32#                      Hamstrings: 15/14#         Min assist required for stool drags due to weakness         Assessment:   Pt completed 5 total visits since start of therapy on 21. At last visit pt demonstrated overall good tolerance to today's session without any adverse reaction. Able to progress to higher level dynamic exercises and tolerated well.  Min assist for recovery but demo appropriate righting reactions and able to step over large objects without UE assist. Has  Not returned in >30 days, will be discharged with home program.       Goal Status:  [] Achieved [x] Partially Achieved  [] Not Achieved       Short term goals  Time Frame for Short term goals: Defer to Long Term Goals  Patient Goals   Patient goals : improve stability with all transfers and mobility    Long Term Goals: 5 weeks   Long Term Goal 1: Pt will demo I with HEP/symptom management. Long Term Goal 2: Pt will demo normal gait mechanics with min/no deficits to ease community mobility. MET   Long Term Goal 3: Pt will demo >10 sec with SLS/tandem stance to demo improved balance. Long Term Goal 4: Pt will complete 6MWT to demo improved >850 ft to demo improved mobility. Long Term Goal 5: Pt will demo >5# improved with microfit to demo improved LE strength to ease prolonged activities. MET  Long Term Goal 6: Pt will demo 5x sit to stand <15 sec to demo improved LE strength and ease with transfers. Mostly MET            Patient Status: [] Continue per initial plan of Care     [x] Patient now discharged     [] Additional visits requested, Please re-certify for additional visits: If we are requesting more visits, we fully anticipate the patient's condition is expected to improve within the treatment timeframe we are requesting. Electronically signed by:  Mike Gomez, PT, DPT, OCS , 11/30/2021, 1:34 PM    11/30/2021 1:34 PM     If you have any questions or concerns, please don't hesitate to call.   Thank you for your referral.    Physician Signature:______________________ Date:______ Time: ________  By signing above, therapists plan is approved by physician

## 2022-02-21 ENCOUNTER — OFFICE VISIT (OUTPATIENT)
Dept: CARDIOLOGY CLINIC | Age: 82
End: 2022-02-21
Payer: MEDICARE

## 2022-02-21 VITALS
WEIGHT: 140.8 LBS | HEIGHT: 65 IN | HEART RATE: 72 BPM | BODY MASS INDEX: 23.46 KG/M2 | DIASTOLIC BLOOD PRESSURE: 76 MMHG | SYSTOLIC BLOOD PRESSURE: 130 MMHG

## 2022-02-21 DIAGNOSIS — I48.0 PAROXYSMAL ATRIAL FIBRILLATION (HCC): Primary | ICD-10-CM

## 2022-02-21 PROCEDURE — G8484 FLU IMMUNIZE NO ADMIN: HCPCS | Performed by: INTERNAL MEDICINE

## 2022-02-21 PROCEDURE — G8420 CALC BMI NORM PARAMETERS: HCPCS | Performed by: INTERNAL MEDICINE

## 2022-02-21 PROCEDURE — 99213 OFFICE O/P EST LOW 20 MIN: CPT | Performed by: INTERNAL MEDICINE

## 2022-02-21 PROCEDURE — 1036F TOBACCO NON-USER: CPT | Performed by: INTERNAL MEDICINE

## 2022-02-21 PROCEDURE — G8399 PT W/DXA RESULTS DOCUMENT: HCPCS | Performed by: INTERNAL MEDICINE

## 2022-02-21 PROCEDURE — 4040F PNEUMOC VAC/ADMIN/RCVD: CPT | Performed by: INTERNAL MEDICINE

## 2022-02-21 PROCEDURE — 1090F PRES/ABSN URINE INCON ASSESS: CPT | Performed by: INTERNAL MEDICINE

## 2022-02-21 PROCEDURE — 93000 ELECTROCARDIOGRAM COMPLETE: CPT | Performed by: INTERNAL MEDICINE

## 2022-02-21 PROCEDURE — 1123F ACP DISCUSS/DSCN MKR DOCD: CPT | Performed by: INTERNAL MEDICINE

## 2022-02-21 PROCEDURE — G8427 DOCREV CUR MEDS BY ELIG CLIN: HCPCS | Performed by: INTERNAL MEDICINE

## 2022-02-21 NOTE — PROGRESS NOTES
CARDIOLOGY NOTE      2/21/2022    RE: Elizabeth Givens  (1940)                               TO:  Dr. Eliseo Ortiz MD            Taco Olguin is a 78 y.o. female who was seen today for management of atrial fibrillation                                    HPI:                   Pt has h/o atrial fibrillation, hypothyroidism, seen today for follow-up. Pt has had an episode of atrial fibrillation was seen by EP was offered ablation however she wanted a second opinion, saw Dr. Shanda Hurst in [facility] and it was decided to treat her medically with increasing dose of her antiarrhythmic since then has not had any further episode    Elizabeth Givens has the following history recorded in care path:  Patient Active Problem List    Diagnosis Date Noted    AF [facility]at[facility] fibrillation) ([facility] [address]     Rectocele 08/17/2015    DDD (degenerative disc disease), lumbar     Vitamin D deficiency     Osteoarthritis of foot, right     Osteoporosis     Arthritis of big toe     B12 deficiency     Hyperlipidemia     Hypothyroid     Lumbar herniated disc 03/01/2011    Hiatal hernia      Current Outpatient Medications   Medication Sig Dispense Refill    levothyroxine (SYNTHROID) 150 MCG tablet Take 1 tablet by mouth daily 90 tablet 1    propafenone (RYTHMOL) 225 MG tablet Take 0.5 tablets by mouth every 8 hours 135 tablet 1    ELIQUIS 5 MG TABS tablet Take 5 mg by mouth 2 times daily      pantoprazole (PROTONIX) 20 MG tablet Take 1 tablet by mouth every morning (before breakfast) 90 tablet 1    acetaminophen (APAP EXTRA STRENGTH) 500 MG tablet Take 1 tablet by mouth every 6 hours as needed for Pain 30 tablet 0    Cyanocobalamin (B-12) 1000 MCG CAPS Take by mouth      Cholecalciferol (VITAMIN D3) 1000 UNITS TABS Take 1 tablet by mouth daily. 30 tablet 0     No current facility-administered medications for this visit.      Allergies: Vicodin [hydrocodone-acetaminophen]  Past Medical History:   Diagnosis Date    AF (atrial fibrillation) (HCC)     Allergic rhinitis due to other allergen     Anxiety     Arthritis     Arthritis of big toe 6/12    Dr Carrillo Land- bilateral, treating w orthotics    B12 deficiency     BCC (basal cell carcinoma), face 8/13/13    L cheek- Dr Piotr Li tunnel syndrome     Congenital prolapse of bladder mucosa     DDD (degenerative disc disease), lumbar     Female incontinence     H/O cardiovascular stress test 1/14/2010, 2/6/2007 1/14/2010-Normal perfusion. Normal LVSF. EF 70%;    H/O echocardiogram 1/7/2010, 2/6/2007 1/7/2010-Mild concentric LVH. LVSF normal. EF =>55%.  H/O echocardiogram 03/09/2020    EF 78-92%, Grade I Diastolic Dysfunction, sclerotic but non stenotic aortic valve, Mild Pulm HTN, no pericardial effusion     Hiatal hernia     noted on UGI 10/2020    History of Doppler ultrasound     8/19/2009-Renal US- Normal    Homocysteinemia ([address]     HX OTHER MEDICAL     8/4/2010-48 HR HOLTER MONITOR-Pred rhythm is sinus. No episodes of sustained ectopy seen. 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 she is to watch for acquired risk factors-Dr Jo Lung Osteoarthritis of foot, right     Osteoporosis     SHE REFUSED MEDS, FOSAMAX--- 8/21/17    Pain in shoulder     SBE (subacute bacterial endocarditis) prophylaxis candidate     VHD (valvular heart disease)     Severe Tricuspid Regurgitation (11/4/2005);  Mild Mitral regurgitation    Vitamin D deficiency      Past Surgical History:   Procedure Laterality Date    BLADDER SUSPENSION  01/01/2009    Dr Jose Guadalupe Faust Bilateral 11/01/2012    Dr Danielle Wagner  01/01/1965    FINGER TRIGGER RELEASE Right  HYSTERECTOMY  10/21/2009    and bladder repair    RECTOCELE REPAIR  2011    Dr. Diamante Avila Left 2013    Dr Ari Reilly for [address] [place] [address]  1945    WRIST FRACTURE SURGERY Left 2020    Dr Gaston Hopson      As reviewed   Family History   Problem Relation Age of Onset    Cancer Mother         uterine    Diabetes Mother     Diabetes Father     Other Father          of aneurysm    Diabetes Paternal Grandmother     Clotting Disorder Paternal Uncle     Clotting Disorder Paternal Uncle     Clotting Disorder Paternal Uncle     Cancer Other         liver cancer    Cancer Maternal Aunt         breast cancer    Cancer Maternal Uncle         lung cancer    Clotting Disorder Paternal Aunt     Other Other         strong family history of clotting problems     Social History     Tobacco Use    Smoking status: Never Smoker    Smokeless tobacco: Never Used   Substance Use Topics    Alcohol use: Yes     Comment: occassional wine        Objective: There were no vitals filed for this visit. There were no vitals taken for this visit. No flowsheet data found. Wt Readings from Last 3 Encounters:   21 139 lb (63 kg)   21 144 lb 12.8 oz (65.7 kg)   21 143 lb (64.9 kg)     There is no height or weight on file to calculate BMI. GENERAL - Alert, oriented, pleasant, in no apparent distress. EYES: No jaundice, no conjunctival pallor. SKIN: It is warm & dry. No rashes. No Echhymosis    HEENT - No clinically significant abnormalities seen. Neck - Supple. No jugular venous distention noted. No carotid bruits. Cardiovascular - Normal S1 and S2 without obvious murmur or gallop. Extremities - No cyanosis, clubbing, or significant edema. Pulmonary - No respiratory distress. No wheezes or rales. Abdomen - No masses, tenderness, or organomegaly. Musculoskeletal - No significant edema. No joint deformities. No muscle wasting.   Neurologic - Cranial nerves II through XII are grossly intact. There were no gross focal neurologic abnormalities. Lab Review   Lab Results   Component Value Date    TROPONINT <0.010 12/25/2018     BNP:  No results found for: BNP  PT/INR:    Lab Results   Component Value Date    INR 1.03 12/25/2018     No results found for: LABA1C  Lab Results   Component Value Date    WBC 3.9 (L) 08/25/2021    HCT 43.9 08/25/2021    MCV 91.3 08/25/2021     08/25/2021     Lab Results   Component Value Date    CHOL 173 08/25/2021    TRIG 114 08/25/2021    HDL 61 (H) 08/25/2021    LDLCALC 89 08/25/2021     Lab Results   Component Value Date    ALT 16 08/25/2021    AST 22 08/25/2021     BMP:    Lab Results   Component Value Date     08/25/2021    K 4.8 08/25/2021     08/25/2021    CO2 26 08/25/2021    BUN 15 08/25/2021    CREATININE 0.8 08/25/2021     CMP:   Lab Results   Component Value Date     08/25/2021    K 4.8 08/25/2021     08/25/2021    CO2 26 08/25/2021    BUN 15 08/25/2021    PROT 7.5 08/25/2021    PROT 7.1 02/04/2013     TSH:    Lab Results   Component Value Date    TSH 1.08 08/25/2021    TSHHS 3.360 12/25/2018           Assessment & Plan:    - Atrial fibrillation, pt is  compliant with meds. Patient does not have symptoms from atrial fibrillation  On Eliquis and Rythmol  We will check an EKG today  Patient is on a higher dose of Rythmol we will continue to monitor today's EKG does not show any ST or T wave changes or QRS abnormalities  Told her if she has any further issues with rhythm she can always give us a call and we will can take care of it      -Hypo-thyroidism patient is on Synthroid 150 mcg p.o. daily        Otilia Ormond MD    [facility]    Please note this report has been partially produced using speech recognition software and may contain errors related to that system including errors in grammar, punctuation, and spelling, as well as words and phrases that may be inappropriate.  If there are any family informed/ED physician notified questions or concerns please feel free to contact the dictating provider for clarification.

## 2022-03-04 ENCOUNTER — OFFICE VISIT (OUTPATIENT)
Dept: INTERNAL MEDICINE CLINIC | Age: 82
End: 2022-03-04
Payer: MEDICARE

## 2022-03-04 VITALS
WEIGHT: 139.13 LBS | HEART RATE: 70 BPM | HEIGHT: 65 IN | OXYGEN SATURATION: 98 % | SYSTOLIC BLOOD PRESSURE: 116 MMHG | DIASTOLIC BLOOD PRESSURE: 64 MMHG | BODY MASS INDEX: 23.18 KG/M2

## 2022-03-04 DIAGNOSIS — I48.0 PAROXYSMAL ATRIAL FIBRILLATION (HCC): ICD-10-CM

## 2022-03-04 DIAGNOSIS — E78.2 MIXED HYPERLIPIDEMIA: ICD-10-CM

## 2022-03-04 DIAGNOSIS — I48.0 PAROXYSMAL ATRIAL FIBRILLATION (HCC): Primary | ICD-10-CM

## 2022-03-04 DIAGNOSIS — E53.8 B12 DEFICIENCY: ICD-10-CM

## 2022-03-04 DIAGNOSIS — E55.9 VITAMIN D DEFICIENCY: ICD-10-CM

## 2022-03-04 DIAGNOSIS — K21.9 GASTROESOPHAGEAL REFLUX DISEASE WITHOUT ESOPHAGITIS: ICD-10-CM

## 2022-03-04 DIAGNOSIS — E03.4 HYPOTHYROIDISM DUE TO ACQUIRED ATROPHY OF THYROID: ICD-10-CM

## 2022-03-04 DIAGNOSIS — K44.9 HIATAL HERNIA: ICD-10-CM

## 2022-03-04 DIAGNOSIS — E78.2 HYPERLIPEMIA, MIXED: ICD-10-CM

## 2022-03-04 LAB
A/G RATIO: 1.6 (ref 1.1–2.2)
ALBUMIN SERPL-MCNC: 4.4 G/DL (ref 3.4–5)
ALP BLD-CCNC: 87 U/L (ref 40–129)
ALT SERPL-CCNC: 14 U/L (ref 10–40)
ANION GAP SERPL CALCULATED.3IONS-SCNC: 10 MMOL/L (ref 3–16)
AST SERPL-CCNC: 18 U/L (ref 15–37)
BASOPHILS ABSOLUTE: 0 K/UL (ref 0–0.2)
BASOPHILS RELATIVE PERCENT: 0.4 %
BILIRUB SERPL-MCNC: 0.6 MG/DL (ref 0–1)
BUN BLDV-MCNC: 21 MG/DL (ref 7–20)
CALCIUM SERPL-MCNC: 9.5 MG/DL (ref 8.3–10.6)
CHLORIDE BLD-SCNC: 101 MMOL/L (ref 99–110)
CHOLESTEROL, TOTAL: 189 MG/DL (ref 0–199)
CO2: 29 MMOL/L (ref 21–32)
CREAT SERPL-MCNC: 0.8 MG/DL (ref 0.6–1.2)
EOSINOPHILS ABSOLUTE: 0 K/UL (ref 0–0.6)
EOSINOPHILS RELATIVE PERCENT: 0.1 %
GFR AFRICAN AMERICAN: >60
GFR NON-AFRICAN AMERICAN: >60
GLUCOSE BLD-MCNC: 86 MG/DL (ref 70–99)
HCT VFR BLD CALC: 43.6 % (ref 36–48)
HDLC SERPL-MCNC: 59 MG/DL (ref 40–60)
HEMOGLOBIN: 14.6 G/DL (ref 12–16)
LDL CHOLESTEROL CALCULATED: 110 MG/DL
LYMPHOCYTES ABSOLUTE: 1.3 K/UL (ref 1–5.1)
LYMPHOCYTES RELATIVE PERCENT: 37.4 %
MCH RBC QN AUTO: 31 PG (ref 26–34)
MCHC RBC AUTO-ENTMCNC: 33.4 G/DL (ref 31–36)
MCV RBC AUTO: 92.9 FL (ref 80–100)
MONOCYTES ABSOLUTE: 0.5 K/UL (ref 0–1.3)
MONOCYTES RELATIVE PERCENT: 12.8 %
NEUTROPHILS ABSOLUTE: 1.8 K/UL (ref 1.7–7.7)
NEUTROPHILS RELATIVE PERCENT: 49.3 %
PDW BLD-RTO: 12.8 % (ref 12.4–15.4)
PLATELET # BLD: 156 K/UL (ref 135–450)
PMV BLD AUTO: 8.1 FL (ref 5–10.5)
POTASSIUM SERPL-SCNC: 4.6 MMOL/L (ref 3.5–5.1)
RBC # BLD: 4.69 M/UL (ref 4–5.2)
SODIUM BLD-SCNC: 140 MMOL/L (ref 136–145)
T4 FREE: 1.8 NG/DL (ref 0.9–1.8)
TOTAL PROTEIN: 7.2 G/DL (ref 6.4–8.2)
TRIGL SERPL-MCNC: 100 MG/DL (ref 0–150)
TSH SERPL DL<=0.05 MIU/L-ACNC: 0.97 UIU/ML (ref 0.27–4.2)
VITAMIN D 25-HYDROXY: 69.1 NG/ML
VLDLC SERPL CALC-MCNC: 20 MG/DL
WBC # BLD: 3.6 K/UL (ref 4–11)

## 2022-03-04 PROCEDURE — 1090F PRES/ABSN URINE INCON ASSESS: CPT | Performed by: INTERNAL MEDICINE

## 2022-03-04 PROCEDURE — 4040F PNEUMOC VAC/ADMIN/RCVD: CPT | Performed by: INTERNAL MEDICINE

## 2022-03-04 PROCEDURE — G8484 FLU IMMUNIZE NO ADMIN: HCPCS | Performed by: INTERNAL MEDICINE

## 2022-03-04 PROCEDURE — G8420 CALC BMI NORM PARAMETERS: HCPCS | Performed by: INTERNAL MEDICINE

## 2022-03-04 PROCEDURE — 36415 COLL VENOUS BLD VENIPUNCTURE: CPT | Performed by: INTERNAL MEDICINE

## 2022-03-04 PROCEDURE — 1123F ACP DISCUSS/DSCN MKR DOCD: CPT | Performed by: INTERNAL MEDICINE

## 2022-03-04 PROCEDURE — 1036F TOBACCO NON-USER: CPT | Performed by: INTERNAL MEDICINE

## 2022-03-04 PROCEDURE — G8399 PT W/DXA RESULTS DOCUMENT: HCPCS | Performed by: INTERNAL MEDICINE

## 2022-03-04 PROCEDURE — 99214 OFFICE O/P EST MOD 30 MIN: CPT | Performed by: INTERNAL MEDICINE

## 2022-03-04 PROCEDURE — G8427 DOCREV CUR MEDS BY ELIG CLIN: HCPCS | Performed by: INTERNAL MEDICINE

## 2022-03-04 RX ORDER — LEVOTHYROXINE SODIUM 0.15 MG/1
150 TABLET ORAL DAILY
Qty: 90 TABLET | Refills: 1 | Status: ON HOLD
Start: 2022-03-04 | End: 2022-10-10 | Stop reason: HOSPADM

## 2022-03-04 RX ORDER — MELATONIN
1 DAILY
Qty: 30 TABLET | Refills: 0 | Status: CANCELLED | OUTPATIENT
Start: 2022-03-04

## 2022-03-04 RX ORDER — PROPAFENONE HYDROCHLORIDE 225 MG/1
150 TABLET, FILM COATED ORAL EVERY 8 HOURS
Qty: 135 TABLET | Refills: 1 | Status: CANCELLED | OUTPATIENT
Start: 2022-03-04 | End: 2022-06-02

## 2022-03-04 RX ORDER — APIXABAN 5 MG/1
5 TABLET, FILM COATED ORAL 2 TIMES DAILY
Qty: 60 TABLET | Status: CANCELLED | OUTPATIENT
Start: 2022-03-04

## 2022-03-04 RX ORDER — PANTOPRAZOLE SODIUM 20 MG/1
20 TABLET, DELAYED RELEASE ORAL
Qty: 90 TABLET | Refills: 1 | Status: CANCELLED | OUTPATIENT
Start: 2022-03-04

## 2022-03-04 SDOH — ECONOMIC STABILITY: FOOD INSECURITY: WITHIN THE PAST 12 MONTHS, THE FOOD YOU BOUGHT JUST DIDN'T LAST AND YOU DIDN'T HAVE MONEY TO GET MORE.: NEVER TRUE

## 2022-03-04 SDOH — ECONOMIC STABILITY: FOOD INSECURITY: WITHIN THE PAST 12 MONTHS, YOU WORRIED THAT YOUR FOOD WOULD RUN OUT BEFORE YOU GOT MONEY TO BUY MORE.: NEVER TRUE

## 2022-03-04 ASSESSMENT — SOCIAL DETERMINANTS OF HEALTH (SDOH): HOW HARD IS IT FOR YOU TO PAY FOR THE VERY BASICS LIKE FOOD, HOUSING, MEDICAL CARE, AND HEATING?: NOT HARD AT ALL

## 2022-03-04 NOTE — PROGRESS NOTES
Riley Ross  1940 03/04/22    SUBJECTIVE:    ATR FIB, seen recently by Dr Rody Sheth. Also has seen Dr Maddy Arias for one visit but will now stay w Dr Rody Sheth primarily, Dr Maddy Arias prn. Has deferred ablation, which Dr Maddy Arias also agreed with. She asked about RF for eliquis and propafenone, I'll check w Dr Aliyah Livingston office if they can manage this since is primarily cardiac med. Hypothyroid has been asymptomatic w/o sx of fatigue, constipation, cold intolerance, depression. GERD:  Is without sx of heartburn or dysphagia, abd pain. Lipids:  Has history of high cholesterol, not on medication but trying to continue regular low fat diet and maintenance of weight, regular exercise. Has b12 defic, also we'll monitor levels    OBJECTIVE:    /64   Pulse 70   Ht 5' 4.5\" (1.638 m)   Wt 139 lb 2 oz (63.1 kg)   SpO2 98%   BMI 23.51 kg/m²     Physical Exam  Vitals reviewed. Constitutional:       Appearance: She is well-developed. HENT:      Head: Normocephalic and atraumatic. Eyes:      General: No scleral icterus. Right eye: No discharge. Left eye: No discharge. Conjunctiva/sclera: Conjunctivae normal.      Pupils: Pupils are equal, round, and reactive to light. Neck:      Thyroid: No thyromegaly. Vascular: No JVD. Trachea: No tracheal deviation. Cardiovascular:      Rate and Rhythm: Normal rate and regular rhythm. Heart sounds: Normal heart sounds. No murmur heard. No friction rub. No gallop. Pulmonary:      Effort: Pulmonary effort is normal. No respiratory distress. Breath sounds: Normal breath sounds. No wheezing or rales. Abdominal:      General: Bowel sounds are normal. There is no distension. Palpations: Abdomen is soft. There is no mass. Tenderness: There is no abdominal tenderness. There is no guarding or rebound. Hernia: No hernia is present. Musculoskeletal:      Cervical back: Neck supple.    Lymphadenopathy: Cervical: No cervical adenopathy. Skin:     General: Skin is warm and dry. Neurological:      Mental Status: She is alert. Psychiatric:         Mood and Affect: Mood normal.         ASSESSMENT:    1. Paroxysmal atrial fibrillation (HCC)    2. Gastroesophageal reflux disease without esophagitis    3. Hiatal hernia    4. Hypothyroidism due to acquired atrophy of thyroid    5. Mixed hyperlipidemia    6. B12 deficiency        PLAN:    Tyrese Haro was seen today for 6 month follow-up. Diagnoses and all orders for this visit:    Paroxysmal atrial fibrillation ([address] - Pt clinically w/o evidence of cardiovascular instability, cont regimen. SHE WILL CHECK W DR Shay Garza FOR RF OF AFIB MEDS INCL PROPAFENONE AND ELIQUIS, IS NOW TO F/U W DR RANDLE PRN AND DEFERS ABLATION. CHECK LAB  -     Comprehensive Metabolic Panel; Future  -     CBC with Auto Differential; Future    Gastroesophageal reflux disease without esophagitis - GERD controlled on meds and will refill, monitor for any recurrent or worsening sx. 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 -     levothyroxine (SYNTHROID) 150 MCG tablet; Take 1 tablet by mouth daily  -     TSH; Future  -     T4, Free; Future    Mixed hyperlipidemia - Pt will continue to work on a low fat diet and also exercise, wt loss as appropriate. Will continue periodic monitoring of fasting lipid profile, glucose, liver function. -     Comprehensive Metabolic Panel; Future  -     CBC with Auto Differential; Future  -     Lipid Panel;  Future    B12 deficiency- FOR F/U LEVEL  -     Vitamin B12; Future

## 2022-03-06 NOTE — RESULT ENCOUNTER NOTE
Call pt, labs ok/chol ok as well as thyroid fxn and vit d level.   She hs a periodically low white count on blood counts so we'll monitor this for now on periodic lab

## 2022-03-11 ENCOUNTER — TELEPHONE (OUTPATIENT)
Dept: CARDIOLOGY CLINIC | Age: 82
End: 2022-03-11

## 2022-03-11 DIAGNOSIS — I48.0 PAROXYSMAL ATRIAL FIBRILLATION (HCC): ICD-10-CM

## 2022-03-11 NOTE — TELEPHONE ENCOUNTER
Patient called stating that she needs a prior auth for Eliquis, please call her back at ph #[phone].

## 2022-03-14 RX ORDER — PROPAFENONE HYDROCHLORIDE 150 MG/1
150 TABLET, FILM COATED ORAL EVERY 8 HOURS
Qty: 135 TABLET | Refills: 3 | Status: SHIPPED | OUTPATIENT
Start: 2022-03-14 | End: 2022-07-29 | Stop reason: SDUPTHER

## 2022-03-14 RX ORDER — APIXABAN 5 MG/1
5 TABLET, FILM COATED ORAL 2 TIMES DAILY
Qty: 180 TABLET | Refills: 3 | Status: SHIPPED | OUTPATIENT
Start: 2022-03-14

## 2022-03-14 RX ORDER — PROPAFENONE HYDROCHLORIDE 150 MG/1
150 TABLET, FILM COATED ORAL EVERY 8 HOURS
Qty: 270 TABLET | Refills: 1 | OUTPATIENT
Start: 2022-03-14 | End: 2022-09-10

## 2022-03-14 RX ORDER — APIXABAN 5 MG/1
5 TABLET, FILM COATED ORAL 2 TIMES DAILY
Qty: 180 TABLET | Refills: 1 | OUTPATIENT
Start: 2022-03-14

## 2022-07-29 DIAGNOSIS — I48.0 PAROXYSMAL ATRIAL FIBRILLATION (HCC): ICD-10-CM

## 2022-07-29 RX ORDER — PROPAFENONE HYDROCHLORIDE 225 MG/1
225 TABLET, FILM COATED ORAL 2 TIMES DAILY
Qty: 180 TABLET | Refills: 3 | Status: SHIPPED | OUTPATIENT
Start: 2022-07-29 | End: 2022-10-27

## 2022-09-06 DIAGNOSIS — E03.4 HYPOTHYROIDISM DUE TO ACQUIRED ATROPHY OF THYROID: ICD-10-CM

## 2022-09-06 DIAGNOSIS — E53.8 B12 DEFICIENCY: ICD-10-CM

## 2022-09-06 DIAGNOSIS — E78.2 MIXED HYPERLIPIDEMIA: ICD-10-CM

## 2022-09-06 DIAGNOSIS — K21.9 GASTROESOPHAGEAL REFLUX DISEASE WITHOUT ESOPHAGITIS: ICD-10-CM

## 2022-09-06 DIAGNOSIS — I48.0 PAROXYSMAL ATRIAL FIBRILLATION (HCC): ICD-10-CM

## 2022-09-06 LAB
A/G RATIO: 1.4 (ref 1.1–2.2)
ALBUMIN SERPL-MCNC: 4.2 G/DL (ref 3.4–5)
ALP BLD-CCNC: 88 U/L (ref 40–129)
ALT SERPL-CCNC: 11 U/L (ref 10–40)
ANION GAP SERPL CALCULATED.3IONS-SCNC: 12 MMOL/L (ref 3–16)
AST SERPL-CCNC: 16 U/L (ref 15–37)
BASOPHILS ABSOLUTE: 0 K/UL (ref 0–0.2)
BASOPHILS RELATIVE PERCENT: 0.3 %
BILIRUB SERPL-MCNC: 0.7 MG/DL (ref 0–1)
BUN BLDV-MCNC: 20 MG/DL (ref 7–20)
CALCIUM SERPL-MCNC: 9.6 MG/DL (ref 8.3–10.6)
CHLORIDE BLD-SCNC: 102 MMOL/L (ref 99–110)
CHOLESTEROL, TOTAL: 166 MG/DL (ref 0–199)
CO2: 27 MMOL/L (ref 21–32)
CREAT SERPL-MCNC: 0.9 MG/DL (ref 0.6–1.2)
EOSINOPHILS ABSOLUTE: 0 K/UL (ref 0–0.6)
EOSINOPHILS RELATIVE PERCENT: 0.2 %
GFR AFRICAN AMERICAN: >60
GFR NON-AFRICAN AMERICAN: 60
GLUCOSE BLD-MCNC: 82 MG/DL (ref 70–99)
HCT VFR BLD CALC: 42.3 % (ref 36–48)
HDLC SERPL-MCNC: 49 MG/DL (ref 40–60)
HEMOGLOBIN: 14.2 G/DL (ref 12–16)
LDL CHOLESTEROL CALCULATED: 96 MG/DL
LYMPHOCYTES ABSOLUTE: 1.3 K/UL (ref 1–5.1)
LYMPHOCYTES RELATIVE PERCENT: 31.2 %
MCH RBC QN AUTO: 31 PG (ref 26–34)
MCHC RBC AUTO-ENTMCNC: 33.5 G/DL (ref 31–36)
MCV RBC AUTO: 92.4 FL (ref 80–100)
MONOCYTES ABSOLUTE: 0.5 K/UL (ref 0–1.3)
MONOCYTES RELATIVE PERCENT: 11.9 %
NEUTROPHILS ABSOLUTE: 2.3 K/UL (ref 1.7–7.7)
NEUTROPHILS RELATIVE PERCENT: 56.4 %
PDW BLD-RTO: 13.2 % (ref 12.4–15.4)
PLATELET # BLD: 162 K/UL (ref 135–450)
PMV BLD AUTO: 8.5 FL (ref 5–10.5)
POTASSIUM SERPL-SCNC: 4.7 MMOL/L (ref 3.5–5.1)
RBC # BLD: 4.58 M/UL (ref 4–5.2)
SODIUM BLD-SCNC: 141 MMOL/L (ref 136–145)
T4 FREE: 2 NG/DL (ref 0.9–1.8)
TOTAL PROTEIN: 7.1 G/DL (ref 6.4–8.2)
TRIGL SERPL-MCNC: 106 MG/DL (ref 0–150)
TSH SERPL DL<=0.05 MIU/L-ACNC: 0.16 UIU/ML (ref 0.27–4.2)
VLDLC SERPL CALC-MCNC: 21 MG/DL
WBC # BLD: 4.1 K/UL (ref 4–11)

## 2022-09-06 PROCEDURE — 36415 COLL VENOUS BLD VENIPUNCTURE: CPT | Performed by: INTERNAL MEDICINE

## 2022-09-06 RX ORDER — LEVOTHYROXINE SODIUM 150 MCG
TABLET ORAL
Qty: 90 TABLET | Refills: 1 | OUTPATIENT
Start: 2022-09-06

## 2022-09-07 ENCOUNTER — OFFICE VISIT (OUTPATIENT)
Dept: INTERNAL MEDICINE CLINIC | Age: 82
End: 2022-09-07
Payer: MEDICARE

## 2022-09-07 VITALS
OXYGEN SATURATION: 98 % | DIASTOLIC BLOOD PRESSURE: 70 MMHG | HEIGHT: 65 IN | BODY MASS INDEX: 22.99 KG/M2 | WEIGHT: 138 LBS | SYSTOLIC BLOOD PRESSURE: 124 MMHG | RESPIRATION RATE: 14 BRPM | HEART RATE: 73 BPM

## 2022-09-07 DIAGNOSIS — I48.0 PAROXYSMAL ATRIAL FIBRILLATION (HCC): ICD-10-CM

## 2022-09-07 DIAGNOSIS — E78.2 MIXED HYPERLIPIDEMIA: ICD-10-CM

## 2022-09-07 DIAGNOSIS — Z00.00 ROUTINE GENERAL MEDICAL EXAMINATION AT A HEALTH CARE FACILITY: Primary | ICD-10-CM

## 2022-09-07 DIAGNOSIS — E03.4 HYPOTHYROIDISM DUE TO ACQUIRED ATROPHY OF THYROID: ICD-10-CM

## 2022-09-07 DIAGNOSIS — E53.8 B12 DEFICIENCY: ICD-10-CM

## 2022-09-07 DIAGNOSIS — Z00.00 MEDICARE ANNUAL WELLNESS VISIT, SUBSEQUENT: ICD-10-CM

## 2022-09-07 DIAGNOSIS — E55.9 VITAMIN D DEFICIENCY: ICD-10-CM

## 2022-09-07 LAB — VITAMIN B-12: 437 PG/ML (ref 211–911)

## 2022-09-07 PROCEDURE — G8399 PT W/DXA RESULTS DOCUMENT: HCPCS | Performed by: INTERNAL MEDICINE

## 2022-09-07 PROCEDURE — 99214 OFFICE O/P EST MOD 30 MIN: CPT | Performed by: INTERNAL MEDICINE

## 2022-09-07 PROCEDURE — G0439 PPPS, SUBSEQ VISIT: HCPCS | Performed by: INTERNAL MEDICINE

## 2022-09-07 PROCEDURE — 1090F PRES/ABSN URINE INCON ASSESS: CPT | Performed by: INTERNAL MEDICINE

## 2022-09-07 PROCEDURE — G8420 CALC BMI NORM PARAMETERS: HCPCS | Performed by: INTERNAL MEDICINE

## 2022-09-07 PROCEDURE — G8427 DOCREV CUR MEDS BY ELIG CLIN: HCPCS | Performed by: INTERNAL MEDICINE

## 2022-09-07 PROCEDURE — 1036F TOBACCO NON-USER: CPT | Performed by: INTERNAL MEDICINE

## 2022-09-07 PROCEDURE — 1123F ACP DISCUSS/DSCN MKR DOCD: CPT | Performed by: INTERNAL MEDICINE

## 2022-09-07 RX ORDER — LEVOTHYROXINE SODIUM 0.15 MG/1
150 TABLET ORAL DAILY
Qty: 90 TABLET | Refills: 1 | Status: CANCELLED | OUTPATIENT
Start: 2022-09-07

## 2022-09-07 RX ORDER — LEVOTHYROXINE SODIUM 137 MCG
137 TABLET ORAL DAILY
Qty: 90 TABLET | Refills: 1 | Status: SHIPPED | OUTPATIENT
Start: 2022-09-07

## 2022-09-07 RX ORDER — LEVOTHYROXINE SODIUM 150 MCG
150 TABLET ORAL DAILY
Qty: 90 TABLET | Refills: 1 | Status: CANCELLED | OUTPATIENT
Start: 2022-09-07

## 2022-09-07 ASSESSMENT — PATIENT HEALTH QUESTIONNAIRE - PHQ9
SUM OF ALL RESPONSES TO PHQ QUESTIONS 1-9: 0
SUM OF ALL RESPONSES TO PHQ9 QUESTIONS 1 & 2: 0
SUM OF ALL RESPONSES TO PHQ QUESTIONS 1-9: 0
1. LITTLE INTEREST OR PLEASURE IN DOING THINGS: 0
2. FEELING DOWN, DEPRESSED OR HOPELESS: 0

## 2022-09-07 ASSESSMENT — LIFESTYLE VARIABLES
HOW MANY STANDARD DRINKS CONTAINING ALCOHOL DO YOU HAVE ON A TYPICAL DAY: PATIENT DOES NOT DRINK
HOW OFTEN DO YOU HAVE A DRINK CONTAINING ALCOHOL: NEVER

## 2022-09-07 NOTE — PROGRESS NOTES
Medicare Annual Wellness Visit    Mona Cerna is here for Medicare AWV  - addendum 10/18/22- Mona Cerna requires a 3 in 1 bedside commode due to being unable to use the toilet within the home  and   confined to a single room   confined to one level of the home. Assessment & Plan   Routine general medical examination at a health care facility - remains independent, functional and active, no indications/needs for other interventions noted at this time- except as noted below and also findings noted on screening medicare questions. Paroxysmal atrial fibrillation (HCC) - Pt clinically w/o evidence of cardiovascular instability, cont regimen. CONT F/U DR RANDLE TOO  -     Comprehensive Metabolic Panel; Future  -     CBC with Auto Differential; Future  -     Lipid Panel; Future  -     Comprehensive Metabolic Panel; Future  -     CBC with Auto Differential; Future  Hypothyroidism due to acquired atrophy of thyroid - we'll LOWER DOSE FROM 150-137MCG/DAY THEN RECHECK TFTS IN TWO MONTHS  -     SYNTHROID 137 MCG tablet; Take 1 tablet by mouth Daily Glen brand synthroid only, Disp-90 tablet, R-1, DAWNormal  -     TSH; Future  -     T4, Free; Future  -     TSH; Future  -     T4, Free; Future  -     TSH; Future  -     T4, Free; Future  -     TSH; Future  -     T4, Free; Future  B12 deficiency- MONITOR LEVELS  -     Vitamin B12; Future  -     Vitamin B12; Future  Mixed hyperlipidemia - Pt will continue to work on a low fat diet and also exercise, wt loss as appropriate. Will continue periodic monitoring of fasting lipid profile, glucose, liver function. -     Comprehensive Metabolic Panel; Future  -     CBC with Auto Differential; Future  -     Lipid Panel; Future  -     Comprehensive Metabolic Panel; Future  -     CBC with Auto Differential; Future  -     Lipid Panel; Future  Vitamin D deficiency- MONITORING LEVELS  -     Vitamin D 25 Hydroxy; Future  -     Vitamin D 25 Hydroxy;  Future  Medicare annual wellness visit, subsequent    Recommendations for Preventive Services Due: see orders and patient instructions/AVS.  Recommended screening schedule for the next 5-10 years is provided to the patient in written form: see Patient Instructions/AVS.     Return in 6 months (on 3/7/2023) for Medicare Annual Wellness Visit in 1 year, routine. Subjective   The following acute and/or chronic problems were also addressed today:  HYPOTHYROID TSH WAS SL LOW AND T4 SL HIGH SO WE'LL DECR SYNTHROID    Patient's complete Health Risk Assessment and screening values have been reviewed and are found in Flowsheets. The following problems were reviewed today and where indicated follow up appointments were made and/or referrals ordered. Positive Risk Factor Screenings with Interventions:             General Health and ACP:  General  In general, how would you say your health is?: Very Good  In the past 7 days, have you experienced any of the following: New or Increased Pain, New or Increased Fatigue, Loneliness, Social Isolation, Stress or Anger?: No  Do you get the social and emotional support that you need?: Yes  Do you have a Living Will?: Yes    Advance Directives       Power of  Living Will ACP-Advance Directive ACP-Power of     Not on File Not on File Not on File Not on File        General Health Risk Interventions:  NO ISSUES ABOVE    Health Habits/Nutrition:  Physical Activity: Inactive    Days of Exercise per Week: 0 days    Minutes of Exercise per Session: 0 min     Have you lost any weight without trying in the past 3 months?: No  Body mass index: 23.32  Have you seen the dentist within the past year?: Appointment is scheduled  Health Habits/Nutrition Interventions:  DID ADVISE MORE REGULAR WALKING AS TOLERATED AND IS TO WORK ON THIS. Objective   Vitals:    09/07/22 1040   BP: 124/70   Pulse: 73   Resp: 14   SpO2: 98%   Weight: 138 lb (62.6 kg)   Height: 5' 4.5\" (1.638 m)      Body mass index is 23.32 kg/m². CareTe (Including outside providers/suppliers regularly involved in providing care):   Patient Care Team:  Myra Guallpa MD as PCP - General (Internal Medicine)  Myra Guallpa MD as PCP [facility]  Blaise Gonzalez MD as Consulting Physician (Cardiology)  Maureen Kawasaki, MD as Consulting Physician (Cardiology)     Reviewed and updated this visit:  Tobacco  Allergies  Meds  Problems  Med Hx  Surg Hx  Soc Hx  Fam Hx

## 2022-09-07 NOTE — PATIENT INSTRUCTIONS
Personalized Preventive Plan for Teresa Livingston - 9/7/2022  Medicare offers a range of preventive health benefits. Some of the tests and screenings are paid in full while other may be subject to a deductible, co-insurance, and/or copay. Some of these benefits include a comprehensive review of your medical history including lifestyle, illnesses that may run in your family, and various assessments and screenings as appropriate. After reviewing your medical record and screening and assessments performed today your provider may have ordered immunizations, labs, imaging, and/or referrals for you. A list of these orders (if applicable) as well as your Preventive Care list are included within your After Visit Summary for your review. Other Preventive Recommendations:    A preventive eye exam performed by an eye specialist is recommended every 1-2 years to screen for glaucoma; cataracts, macular degeneration, and other eye disorders. A preventive dental visit is recommended every 6 months. Try to get at least 150 minutes of exercise per week or 10,000 steps per day on a pedometer . Order or download the FREE \"Exercise & Physical Activity: Your Everyday Guide\" from [employer]. Call 6-847.849.6784 or search [employer] online. You need 6649-2011 mg of calcium and 2273-4112 IU of vitamin D per day. It is possible to meet your calcium requirement with diet alone, but a vitamin D supplement is usually necessary to meet this goal.  When exposed to the sun, use a sunscreen that protects against both UVA and UVB radiation with an SPF of 30 or greater. Reapply every 2 to 3 hours or after sweating, drying off with a towel, or swimming. Always wear a seat belt when traveling in a car. Always wear a helmet when riding a bicycle or motorcycle.

## 2022-10-06 ENCOUNTER — HOSPITAL ENCOUNTER (INPATIENT)
Age: 82
LOS: 4 days | Discharge: HOME HEALTH | DRG: 871 | End: 2022-10-10
Attending: EMERGENCY MEDICINE | Admitting: STUDENT IN AN ORGANIZED HEALTH CARE EDUCATION/TRAINING PROGRAM
Payer: MEDICARE

## 2022-10-06 ENCOUNTER — TELEMEDICINE (OUTPATIENT)
Dept: INTERNAL MEDICINE CLINIC | Age: 82
End: 2022-10-06
Payer: MEDICARE

## 2022-10-06 ENCOUNTER — APPOINTMENT (OUTPATIENT)
Dept: GENERAL RADIOLOGY | Age: 82
DRG: 871 | End: 2022-10-06
Payer: MEDICARE

## 2022-10-06 ENCOUNTER — APPOINTMENT (OUTPATIENT)
Dept: CT IMAGING | Age: 82
DRG: 871 | End: 2022-10-06
Payer: MEDICARE

## 2022-10-06 DIAGNOSIS — R74.8 ELEVATED LIPASE: ICD-10-CM

## 2022-10-06 DIAGNOSIS — U07.1 COVID-19 VIRUS INFECTION: Primary | ICD-10-CM

## 2022-10-06 DIAGNOSIS — E86.0 DEHYDRATION: ICD-10-CM

## 2022-10-06 DIAGNOSIS — R11.2 NAUSEA VOMITING AND DIARRHEA: ICD-10-CM

## 2022-10-06 DIAGNOSIS — E87.1 HYPONATREMIA: Primary | ICD-10-CM

## 2022-10-06 DIAGNOSIS — U07.1 COVID: ICD-10-CM

## 2022-10-06 DIAGNOSIS — R19.7 NAUSEA VOMITING AND DIARRHEA: ICD-10-CM

## 2022-10-06 DIAGNOSIS — N13.30 HYDRONEPHROSIS, UNSPECIFIED HYDRONEPHROSIS TYPE: ICD-10-CM

## 2022-10-06 LAB
ALBUMIN SERPL-MCNC: 3.7 GM/DL (ref 3.4–5)
ALP BLD-CCNC: 71 IU/L (ref 40–129)
ALT SERPL-CCNC: 20 U/L (ref 10–40)
ANION GAP SERPL CALCULATED.3IONS-SCNC: 8 MMOL/L (ref 4–16)
AST SERPL-CCNC: 29 IU/L (ref 15–37)
BACTERIA: NEGATIVE /HPF
BANDED NEUTROPHILS ABSOLUTE COUNT: 0.1 K/CU MM
BANDED NEUTROPHILS RELATIVE PERCENT: 10 % (ref 5–11)
BASOPHILS ABSOLUTE: 0 K/CU MM
BASOPHILS RELATIVE PERCENT: 2 % (ref 0–1)
BILIRUB SERPL-MCNC: 0.4 MG/DL (ref 0–1)
BILIRUBIN URINE: NEGATIVE
BLOOD, URINE: NEGATIVE
BUN BLDV-MCNC: 9 MG/DL (ref 6–23)
CALCIUM SERPL-MCNC: 7.8 MG/DL (ref 8.3–10.6)
CELLS COUNTED: 50
CHLORIDE BLD-SCNC: 83 MMOL/L (ref 99–110)
CLARITY: CLEAR
CO2: 23 MMOL/L (ref 21–32)
COLOR: YELLOW
CREAT SERPL-MCNC: 0.5 MG/DL (ref 0.6–1.1)
DIFFERENTIAL TYPE: ABNORMAL
ERYTHROCYTE SEDIMENTATION RATE: 1 MM/HR (ref 0–30)
GFR AFRICAN AMERICAN: >60 ML/MIN/1.73M2
GFR NON-AFRICAN AMERICAN: >60 ML/MIN/1.73M2
GLUCOSE BLD-MCNC: 115 MG/DL (ref 70–99)
GLUCOSE BLD-MCNC: 96 MG/DL (ref 70–99)
GLUCOSE, URINE: NEGATIVE MG/DL
HCT VFR BLD CALC: 37.2 % (ref 37–47)
HEMOGLOBIN: 13.3 GM/DL (ref 12.5–16)
KETONES, URINE: 40 MG/DL
LACTATE: 0.8 MMOL/L (ref 0.4–2)
LEUKOCYTE ESTERASE, URINE: NORMAL
LIPASE: 122 IU/L (ref 13–60)
LYMPHOCYTES ABSOLUTE: 0.4 K/CU MM
LYMPHOCYTES RELATIVE PERCENT: 38 % (ref 24–44)
MCH RBC QN AUTO: 30.9 PG (ref 27–31)
MCHC RBC AUTO-ENTMCNC: 35.8 % (ref 32–36)
MCV RBC AUTO: 86.3 FL (ref 78–100)
METAMYELOCYTES ABSOLUTE COUNT: 0.02 K/CU MM
METAMYELOCYTES PERCENT: 2 %
MONOCYTES ABSOLUTE: 0.1 K/CU MM
MONOCYTES RELATIVE PERCENT: 10 % (ref 0–4)
NITRITE URINE, QUANTITATIVE: NEGATIVE
OSMOLALITY: 258 MOS/L (ref 280–300)
PDW BLD-RTO: 11.4 % (ref 11.7–14.9)
PH, URINE: 6
PLATELET # BLD: 115 K/CU MM (ref 140–440)
PLT MORPHOLOGY: ABNORMAL
PMV BLD AUTO: 9.4 FL (ref 7.5–11.1)
POTASSIUM SERPL-SCNC: 4 MMOL/L (ref 3.5–5.1)
PRO-BNP: 481.9 PG/ML
PROTEIN UA: NEGATIVE MG/DL
RBC # BLD: 4.31 M/CU MM (ref 4.2–5.4)
RBC URINE: <1 /HPF
SARS-COV-2, NAAT: DETECTED
SEGMENTED NEUTROPHILS ABSOLUTE COUNT: 0.4 K/CU MM
SEGMENTED NEUTROPHILS RELATIVE PERCENT: 38 % (ref 36–66)
SODIUM BLD-SCNC: 114 MMOL/L (ref 135–145)
SODIUM BLD-SCNC: 123 MMOL/L (ref 135–145)
SODIUM URINE: 73 MMOL/L (ref 35–167)
SOURCE: ABNORMAL
SPECIFIC GRAVITY UA: 1.01
SQUAMOUS EPITHELIAL: 1 /HPF
TOTAL PROTEIN: 6.4 GM/DL (ref 6.4–8.2)
TRICHOMONAS: NORMAL /HPF
TROPONIN T: <0.01 NG/ML
UROBILINOGEN, URINE: 0.2 MG/DL
WBC # BLD: 1 K/CU MM (ref 4–10.5)
WBC UA: 6 /HPF

## 2022-10-06 PROCEDURE — G8427 DOCREV CUR MEDS BY ELIG CLIN: HCPCS | Performed by: INTERNAL MEDICINE

## 2022-10-06 PROCEDURE — 94761 N-INVAS EAR/PLS OXIMETRY MLT: CPT

## 2022-10-06 PROCEDURE — 87186 SC STD MICRODIL/AGAR DIL: CPT

## 2022-10-06 PROCEDURE — 1090F PRES/ABSN URINE INCON ASSESS: CPT | Performed by: INTERNAL MEDICINE

## 2022-10-06 PROCEDURE — 2580000003 HC RX 258: Performed by: PHYSICIAN ASSISTANT

## 2022-10-06 PROCEDURE — 85027 COMPLETE CBC AUTOMATED: CPT

## 2022-10-06 PROCEDURE — 1123F ACP DISCUSS/DSCN MKR DOCD: CPT | Performed by: INTERNAL MEDICINE

## 2022-10-06 PROCEDURE — G8484 FLU IMMUNIZE NO ADMIN: HCPCS | Performed by: INTERNAL MEDICINE

## 2022-10-06 PROCEDURE — 83935 ASSAY OF URINE OSMOLALITY: CPT

## 2022-10-06 PROCEDURE — 80053 COMPREHEN METABOLIC PANEL: CPT

## 2022-10-06 PROCEDURE — 83930 ASSAY OF BLOOD OSMOLALITY: CPT

## 2022-10-06 PROCEDURE — 93005 ELECTROCARDIOGRAM TRACING: CPT | Performed by: PHYSICIAN ASSISTANT

## 2022-10-06 PROCEDURE — 84295 ASSAY OF SERUM SODIUM: CPT

## 2022-10-06 PROCEDURE — 84484 ASSAY OF TROPONIN QUANT: CPT

## 2022-10-06 PROCEDURE — 83690 ASSAY OF LIPASE: CPT

## 2022-10-06 PROCEDURE — 82962 GLUCOSE BLOOD TEST: CPT

## 2022-10-06 PROCEDURE — 81001 URINALYSIS AUTO W/SCOPE: CPT

## 2022-10-06 PROCEDURE — 6360000004 HC RX CONTRAST MEDICATION: Performed by: PHYSICIAN ASSISTANT

## 2022-10-06 PROCEDURE — 87086 URINE CULTURE/COLONY COUNT: CPT

## 2022-10-06 PROCEDURE — 74177 CT ABD & PELVIS W/CONTRAST: CPT

## 2022-10-06 PROCEDURE — 2000000000 HC ICU R&B

## 2022-10-06 PROCEDURE — 96374 THER/PROPH/DIAG INJ IV PUSH: CPT

## 2022-10-06 PROCEDURE — 83605 ASSAY OF LACTIC ACID: CPT

## 2022-10-06 PROCEDURE — 99214 OFFICE O/P EST MOD 30 MIN: CPT | Performed by: INTERNAL MEDICINE

## 2022-10-06 PROCEDURE — 99285 EMERGENCY DEPT VISIT HI MDM: CPT

## 2022-10-06 PROCEDURE — G8420 CALC BMI NORM PARAMETERS: HCPCS | Performed by: INTERNAL MEDICINE

## 2022-10-06 PROCEDURE — 83880 ASSAY OF NATRIURETIC PEPTIDE: CPT

## 2022-10-06 PROCEDURE — 85007 BL SMEAR W/DIFF WBC COUNT: CPT

## 2022-10-06 PROCEDURE — 83036 HEMOGLOBIN GLYCOSYLATED A1C: CPT

## 2022-10-06 PROCEDURE — 87077 CULTURE AEROBIC IDENTIFY: CPT

## 2022-10-06 PROCEDURE — 86141 C-REACTIVE PROTEIN HS: CPT

## 2022-10-06 PROCEDURE — 84300 ASSAY OF URINE SODIUM: CPT

## 2022-10-06 PROCEDURE — 1036F TOBACCO NON-USER: CPT | Performed by: INTERNAL MEDICINE

## 2022-10-06 PROCEDURE — 85652 RBC SED RATE AUTOMATED: CPT

## 2022-10-06 PROCEDURE — 6370000000 HC RX 637 (ALT 250 FOR IP): Performed by: NURSE PRACTITIONER

## 2022-10-06 PROCEDURE — 87635 SARS-COV-2 COVID-19 AMP PRB: CPT

## 2022-10-06 PROCEDURE — G8399 PT W/DXA RESULTS DOCUMENT: HCPCS | Performed by: INTERNAL MEDICINE

## 2022-10-06 PROCEDURE — 71045 X-RAY EXAM CHEST 1 VIEW: CPT

## 2022-10-06 PROCEDURE — 6360000002 HC RX W HCPCS: Performed by: PHYSICIAN ASSISTANT

## 2022-10-06 RX ORDER — SODIUM CHLORIDE 9 MG/ML
INJECTION, SOLUTION INTRAVENOUS CONTINUOUS
Status: DISCONTINUED | OUTPATIENT
Start: 2022-10-06 | End: 2022-10-06

## 2022-10-06 RX ORDER — POLYETHYLENE GLYCOL 3350 17 G/17G
17 POWDER, FOR SOLUTION ORAL DAILY PRN
Status: DISCONTINUED | OUTPATIENT
Start: 2022-10-06 | End: 2022-10-10 | Stop reason: HOSPADM

## 2022-10-06 RX ORDER — INSULIN LISPRO 100 [IU]/ML
0-4 INJECTION, SOLUTION INTRAVENOUS; SUBCUTANEOUS NIGHTLY
Status: DISCONTINUED | OUTPATIENT
Start: 2022-10-06 | End: 2022-10-07

## 2022-10-06 RX ORDER — INSULIN LISPRO 100 [IU]/ML
0-8 INJECTION, SOLUTION INTRAVENOUS; SUBCUTANEOUS
Status: DISCONTINUED | OUTPATIENT
Start: 2022-10-07 | End: 2022-10-08

## 2022-10-06 RX ORDER — PREDNISONE 1 MG/1
TABLET ORAL
Qty: 21 TABLET | Refills: 0 | Status: ON HOLD | OUTPATIENT
Start: 2022-10-06 | End: 2022-10-10 | Stop reason: HOSPADM

## 2022-10-06 RX ORDER — NIRMATRELVIR AND RITONAVIR 300-100 MG
KIT ORAL
Qty: 30 TABLET | Refills: 0 | Status: ON HOLD | OUTPATIENT
Start: 2022-10-06 | End: 2022-10-10 | Stop reason: HOSPADM

## 2022-10-06 RX ORDER — 0.9 % SODIUM CHLORIDE 0.9 %
30 INTRAVENOUS SOLUTION INTRAVENOUS PRN
Status: DISCONTINUED | OUTPATIENT
Start: 2022-10-06 | End: 2022-10-10 | Stop reason: HOSPADM

## 2022-10-06 RX ORDER — FAMOTIDINE 20 MG/1
20 TABLET, FILM COATED ORAL 2 TIMES DAILY
Status: DISCONTINUED | OUTPATIENT
Start: 2022-10-06 | End: 2022-10-10 | Stop reason: HOSPADM

## 2022-10-06 RX ORDER — DIPHENOXYLATE HYDROCHLORIDE AND ATROPINE SULFATE 2.5; .025 MG/1; MG/1
1 TABLET ORAL 2 TIMES DAILY
Qty: 10 TABLET | Refills: 0 | Status: ON HOLD | OUTPATIENT
Start: 2022-10-06 | End: 2022-10-10 | Stop reason: HOSPADM

## 2022-10-06 RX ORDER — ONDANSETRON 2 MG/ML
4 INJECTION INTRAMUSCULAR; INTRAVENOUS ONCE
Status: COMPLETED | OUTPATIENT
Start: 2022-10-06 | End: 2022-10-06

## 2022-10-06 RX ORDER — 0.9 % SODIUM CHLORIDE 0.9 %
1000 INTRAVENOUS SOLUTION INTRAVENOUS ONCE
Status: COMPLETED | OUTPATIENT
Start: 2022-10-06 | End: 2022-10-06

## 2022-10-06 RX ORDER — DEXTROSE MONOHYDRATE 100 MG/ML
INJECTION, SOLUTION INTRAVENOUS CONTINUOUS PRN
Status: DISCONTINUED | OUTPATIENT
Start: 2022-10-06 | End: 2022-10-10 | Stop reason: HOSPADM

## 2022-10-06 RX ORDER — ENOXAPARIN SODIUM 100 MG/ML
40 INJECTION SUBCUTANEOUS DAILY
Status: DISCONTINUED | OUTPATIENT
Start: 2022-10-07 | End: 2022-10-06

## 2022-10-06 RX ORDER — GUAIFENESIN AND CODEINE PHOSPHATE 100; 10 MG/5ML; MG/5ML
5 SOLUTION ORAL 4 TIMES DAILY PRN
Qty: 120 ML | Refills: 0 | Status: SHIPPED | OUTPATIENT
Start: 2022-10-06 | End: 2022-10-13

## 2022-10-06 RX ORDER — ONDANSETRON 2 MG/ML
4 INJECTION INTRAMUSCULAR; INTRAVENOUS EVERY 6 HOURS PRN
Status: DISCONTINUED | OUTPATIENT
Start: 2022-10-06 | End: 2022-10-10 | Stop reason: HOSPADM

## 2022-10-06 RX ORDER — ONDANSETRON 4 MG/1
4 TABLET, ORALLY DISINTEGRATING ORAL EVERY 8 HOURS PRN
Status: DISCONTINUED | OUTPATIENT
Start: 2022-10-06 | End: 2022-10-10 | Stop reason: HOSPADM

## 2022-10-06 RX ADMIN — SODIUM CHLORIDE 500 ML: 9 INJECTION, SOLUTION INTRAVENOUS at 18:01

## 2022-10-06 RX ADMIN — IOPAMIDOL 75 ML: 755 INJECTION, SOLUTION INTRAVENOUS at 20:04

## 2022-10-06 RX ADMIN — ONDANSETRON 4 MG: 2 INJECTION INTRAMUSCULAR; INTRAVENOUS at 18:02

## 2022-10-06 RX ADMIN — FAMOTIDINE 20 MG: 20 TABLET ORAL at 22:11

## 2022-10-06 RX ADMIN — SODIUM CHLORIDE: 9 INJECTION, SOLUTION INTRAVENOUS at 19:36

## 2022-10-06 ASSESSMENT — PAIN SCALES - GENERAL: PAINLEVEL_OUTOF10: 0

## 2022-10-06 NOTE — ED PROVIDER NOTES
Paulina independently examined and evaluated Monisha David. In brief their history revealed a 78 y.o. female who presents with cough, nasal congestion, vomiting and diarrhea. Onset 5 days ago. Patient states the vomiting and diarrhea had improved until yesterday. Patient had a televisit with her primary care provider today after testing positive for COVID and medications were sent to the pharmacy. Patient had worsening vomiting and diarrhea and was recommended to come to the emergency department for evaluation. Patient has associated upper abdominal pain. Patient denies chest pain, shortness of breath, fever, sore throat, ear pain. Patient denies lower extremity pain or swelling. Patient has associated decreased urine output    Their focused exam revealed alert and oriented female resting in bed no distress normocephalic atraumatic sclera clear lungs clear heart regular rate and rhythm 2+ pulses throughout abdomen soft nontender diffusely rigidity. Bowel sounds present. 5-5 strength throughout skin has no rash or swelling cranial nerves grossly intact. ED course: Patient seen with PA please see his note. Patient here with abdominal pain nausea vomiting diarrhea difficulty urinating. I was involved in the case as she does have hyponatremia sodium of 114. He feels lightheaded and has not passed out or had a seizure. She has mild pain on exam and CT scan shows no acute problem but did have hydronephrosis she is not urinated in 2 days but she has been urinating here several times. Urine is unremarkable. White count of 1. She tested positive for COVID-19. Patient otherwise is stable work-up otherwise negative will admit to hospital medicine due to hyponatremia COVID-19 dehydration nausea vomiting diarrhea. She was started on IV fluids we will consult nephrology as well.         12 lead EKG per my interpretation:  Sinus Bradycardia 58  Axis is   Normal  QTc is  459  There is no specific T wave changes appreciated. There is no specific ST wave changes appreciated. Prior EKG to compare with was not available        Bishnu Stanley, DO  10/06/22 1650    All diagnostic, treatment, and disposition decisions were made by myself in conjunction with the Advanced Practice Provider. For all further details of the patient's emergency department visit, please see the Advanced Practice Provider's documentation.        Bishnu Stanley, DO  10/06/22 2053

## 2022-10-06 NOTE — ED PROVIDER NOTES
Sue      PCP: Rema Bermudez MD    CHIEF COMPLAINT    Chief Complaint   Patient presents with    Emesis    Nausea    Fatigue       Patient staffed with supervising physician Dr. Eduardo León is a 78 y.o. female who presents with cough, nasal congestion, vomiting and diarrhea. Onset 5 days ago. Patient states the vomiting and diarrhea had improved until yesterday. Patient had a televisit with her primary care provider today after testing positive for COVID and medications were sent to the pharmacy. Patient had worsening vomiting and diarrhea and was recommended to come to the emergency department for evaluation. Patient has associated upper abdominal pain. Patient denies chest pain, shortness of breath, fever, sore throat, ear pain. Patient denies lower extremity pain or swelling. Patient has associated decreased urine output        REVIEW OF SYSTEMS    Constitutional:  Denies fever  HENT:  see HPI Denies sore throat or ear pain   Cardiovascular:  Denies chest pain  Respiratory:  + cough Denies shortness of breath    GI:  see HPI  : See HPI  Musculoskeletal:  Denies back pain  Skin:  Denies rash  Neurologic:  Denies focal weakness or sensory changes   Lymphatic:  Denies swollen glands     All other review of systems are negative  See HPI and nursing notes for additional information     PAST MEDICAL AND SURGICAL HISTORY    Past Medical History:   Diagnosis Date    AF (atrial fibrillation) (HCC)     Allergic rhinitis due to other allergen     Anxiety     Arthritis     Arthritis of big toe 6/12    Dr Roxi Urbina- bilateral, treating w orthotics    B12 deficiency     BCC (basal cell carcinoma), face 8/13/13    L cheek- Dr Cazares Vidal tunnel syndrome     Congenital prolapse of bladder mucosa     DDD (degenerative disc disease), lumbar     Female incontinence     H/O cardiovascular stress test 1/14/2010, 2/6/2007 1/14/2010-Normal perfusion. Normal LVSF.  EF 70%;    H/O echocardiogram 1/7/2010, 2/6/2007 1/7/2010-Mild concentric LVH. LVSF normal. EF =>55%. H/O echocardiogram 03/09/2020    EF 23-98%, Grade I Diastolic Dysfunction, sclerotic but non stenotic aortic valve, Mild Pulm HTN, no pericardial effusion     Hiatal hernia     noted on UGI 10/2020    History of Doppler ultrasound     8/19/2009-Renal US- Normal    Homocysteinemia     HX OTHER MEDICAL     8/4/2010-48 HR HOLTER MONITOR-Pred rhythm is sinus. No episodes of sustained ectopy seen. 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 she is to watch for acquired risk factors-Dr Skye Yi    Osteoarthritis of foot, right     Osteoporosis     SHE REFUSED MEDS, FOSAMAX--- 8/21/17    Pain in shoulder     SBE (subacute bacterial endocarditis) prophylaxis candidate     VHD (valvular heart disease)     Severe Tricuspid Regurgitation (11/4/2005); Mild Mitral regurgitation    Vitamin D deficiency      Past Surgical History:   Procedure Laterality Date    BLADDER SUSPENSION  01/01/2009    Dr Joanie uJarez Bilateral 11/01/2012    Dr Meghan Tian  01/01/1965    FINGER TRIGGER RELEASE Right     HYSTERECTOMY (CERVIX STATUS UNKNOWN)  10/21/2009    and bladder repair    RECTOCELE REPAIR  07/01/2011    Dr. Gabi Manuel Left 08/13/2013    CRISTOFER soto, Dr Elaine Yanes for [facility]  01/01/1945    WRIST FRACTURE SURGERY Left 11/2020    Dr Stuart Holbrook    Current Outpatient Rx   Medication Sig Dispense Refill    nirmatrelvir/ritonavir (PAXLOVID, 300/100,) 20 x 150 MG & 10 x 100MG TBPK Take 3 tablets (two 150 mg nirmatrelvir and one 100 mg ritonavir tablets) by mouth every 12 hours for 5 days. 30 tablet 0    predniSONE (DELTASONE) 5 MG tablet Take 6 tablets by mouth on day 1, 5 on day 2, 4 on day 3, 3 on day 4, 2 on day 5, 1 on day 6. 21 tablet 0    guaiFENesin-codeine (TUSSI-ORGANIDIN NR) 100-10 MG/5ML syrup Take 5 mLs by mouth 4 times daily as needed for Cough or Congestion for up to 7 days. 120 mL 0    diphenoxylate-atropine (DIPHENATOL) 2.5-0.025 MG per tablet Take 1 tablet by mouth in the morning and at bedtime for 5 days. 10 tablet 0    SYNTHROID 137 MCG tablet Take 1 tablet by mouth Daily Glen brand synthroid only 90 tablet 1    propafenone (RYTHMOL) 225 MG tablet Take 1 tablet by mouth in the morning and 1 tablet before bedtime. 180 tablet 3    ELIQUIS 5 MG TABS tablet Take 1 tablet by mouth 2 times daily 180 tablet 3    levothyroxine (SYNTHROID) 150 MCG tablet Take 1 tablet by mouth daily 90 tablet 1    pantoprazole (PROTONIX) 20 MG tablet Take 1 tablet by mouth every morning (before breakfast) 90 tablet 1    acetaminophen (APAP EXTRA STRENGTH) 500 MG tablet Take 1 tablet by mouth every 6 hours as needed for Pain 30 tablet 0    Cyanocobalamin (B-12) 1000 MCG CAPS Take by mouth      Cholecalciferol (VITAMIN D3) 1000 UNITS TABS Take 1 tablet by mouth daily.  30 tablet 0       ALLERGIES    Allergies   Allergen Reactions    Vicodin [Hydrocodone-Acetaminophen] Anaphylaxis       SOCIAL AND FAMILY HISTORY    Social History     Socioeconomic History    Marital status:      Spouse name: None    Number of children: 6    Years of education: None    Highest education level: None   Occupational History    Occupation: retired     Comment: former teacher   Tobacco Use    Smoking status: Never    Smokeless tobacco: Never   Vaping Use    Vaping Use: Never used   Substance and Sexual Activity    Alcohol use: Yes     Comment: occassional wine    Drug use: No    Sexual activity: Yes     Partners: Male     Comment:      Social Determinants of Health     Financial Resource Strain: Low Risk     Difficulty 37.2 37 - 47 %    MCV 86.3 78 - 100 FL    MCH 30.9 27 - 31 PG    MCHC 35.8 32.0 - 36.0 %    RDW 11.4 (L) 11.7 - 14.9 %    Platelets 212 (L) 090 - 440 K/CU MM    MPV 9.4 7.5 - 11.1 FL    Cells Counted 50     Metamyelocytes Relative 2 (H) 0.0 %    Bands Relative 10 5 - 11 %    Segs Relative 38.0 36 - 66 %    Basophils % 2.0 (H) 0 - 1 %    Lymphocytes % 38.0 24 - 44 %    Monocytes % 10.0 (H) 0 - 4 %    Metamyelocytes Absolute 0.02 K/CU MM    Bands Absolute 0.10 K/CU MM    Segs Absolute 0.4 K/CU MM    Basophils Absolute 0.0 K/CU MM    Lymphocytes Absolute 0.4 K/CU MM    Monocytes Absolute 0.1 K/CU MM    Differential Type MANUAL DIFFERENTIAL     PLT Morphology LARGE    Comprehensive Metabolic Panel   Result Value Ref Range    Sodium 114 (LL) 135 - 145 MMOL/L    Potassium 4.0 3.5 - 5.1 MMOL/L    Chloride 83 (L) 99 - 110 mMol/L    CO2 23 21 - 32 MMOL/L    BUN 9 6 - 23 MG/DL    Creatinine 0.5 (L) 0.6 - 1.1 MG/DL    Glucose 115 (H) 70 - 99 MG/DL    Calcium 7.8 (L) 8.3 - 10.6 MG/DL    Albumin 3.7 3.4 - 5.0 GM/DL    Total Protein 6.4 6.4 - 8.2 GM/DL    Total Bilirubin 0.4 0.0 - 1.0 MG/DL    ALT 20 10 - 40 U/L    AST 29 15 - 37 IU/L    Alkaline Phosphatase 71 40 - 129 IU/L    GFR Non-African American >60 >60 mL/min/1.73m2    GFR African American >60 >60 mL/min/1.73m2    Anion Gap 8 4 - 16   Troponin   Result Value Ref Range    Troponin T <0.010 <0.01 NG/ML   Lipase   Result Value Ref Range    Lipase 122 (H) 13 - 60 IU/L   Urinalysis   Result Value Ref Range    Color, UA YELLOW     Clarity, UA CLEAR     Glucose, Urine NEGATIVE MG/DL    Bilirubin Urine NEGATIVE     Ketones, Urine 40 MG/DL    Specific Gravity, UA 1.015     Blood, Urine NEGATIVE     pH, Urine 6.0     Protein, UA NEGATIVE MG/DL    Urobilinogen, Urine 0.2 MG/DL    Nitrite Urine, Quantitative NEGATIVE     Leukocyte Esterase, Urine TRACE    Lactic Acid   Result Value Ref Range    Lactate 0.8 0.4 - 2.0 mMOL/L   Brain Natriuretic Peptide   Result Value Ref Range Pro-.9 (H) <300 PG/ML   Microscopic Urinalysis   Result Value Ref Range    RBC, UA <1 /HPF    WBC, UA 6 /HPF    Bacteria, UA NEGATIVE /HPF    Squam Epithel, UA 1 /HPF    Trichomonas, UA NONE SEEN /HPF   Osmolality   Result Value Ref Range    Osmolality 258 (L) 280 - 300 MOS/L           EKG      EKG Interpretation  Please see ED physician's note for EKG interpretation      RADIOLOGY    CT ABDOMEN PELVIS W IV CONTRAST Additional Contrast? None   Final Result   Moderate right and mild left hydronephrosis, which is likely secondary to   back pressure from urinary bladder distension. There is urothelial thickening, and therefore air tract   infection/inflammation should be considered. Small to moderate amount of free pelvic fluid, which is presumably reactive. Moderate diverticulosis of the large bowel, but without convincing CT   evidence of diverticulitis. Small hiatal hernia. XR CHEST PORTABLE   Final Result   1. Mild pulmonary vascular congestion. ED COURSE & MEDICAL DECISION MAKING      Patient presents as above. Patient provided IV fluids, Zofran. See physician note for EKG reading. CBC shows white blood count of 1, platelets 616. Troponin negative. Lipase 122. Lactic acid 0.8. Chest x-ray shows mild pulmonary vascular congestion. Normal saline bolus is stopped after 500 cc and switch to 75 mL an hour normal saline. Consult nephrologist Dr. Cydney Kanner who agrees with current plan, requests that metabolic panel be added every 6 hours for the next 2 days, urine osmolality, serum osmolality and urine sodium. CT abdomen pelvis with IV contrast shows moderate right and mild left hydronephrosis which is likely secondary to urinary bladder distention, urethral thickening infection/inflammation should be considered, small to moderate amount of free fluid in pelvis presumably reactive. Patient denies any pain with urination.   Urinalysis with negative nitrites, trace leukocytes, 6 white blood cells, negative bacteria, 1 squamous epithelial cells. Urine is sent for culture. Patient states she is now urinating without difficulty, denies back pain. There is no CVA tenderness. Consult hospitalist request that clinical care doctor be consulted. Consult to critical care Dr. Logan Izaguirre who accepts admission. CRITICAL CARE NOTE:  There was a high probability of clinically significant life-threatening deterioration of the patient's condition requiring my urgent intervention due to hyponatremia. IV fluids, nephrology consult was performed to address this. Total critical care time is at least  35 minutes. This includes vital sign monitoring, pulse oximetry monitoring, telemetry monitoring, clinical response to the IV medications, reviewing the nursing notes, consultation time, dictation/documentation time, and interpretation of the lab work. This time excludes time spent performing procedures and separately billable procedures and family discussion time. Clinical  IMPRESSION    1. Hyponatremia    2. Elevated lipase    3. COVID    4. Hydronephrosis, unspecified hydronephrosis type    5. Nausea vomiting and diarrhea          Patient admitted    Comment: Please note this report has been produced using speech recognition software and may contain errors related to that system including errors in grammar, punctuation, and spelling, as well as words and phrases that may be inappropriate. If there are any questions or concerns please feel free to contact the dictating provider for clarification.            Jennifer Randall PA-C  10/06/22 7945

## 2022-10-07 LAB
ALBUMIN SERPL-MCNC: 3.4 GM/DL (ref 3.4–5)
ALP BLD-CCNC: 70 IU/L (ref 40–129)
ALT SERPL-CCNC: 19 U/L (ref 10–40)
ANION GAP SERPL CALCULATED.3IONS-SCNC: 10 MMOL/L (ref 4–16)
ANION GAP SERPL CALCULATED.3IONS-SCNC: 6 MMOL/L (ref 4–16)
ANION GAP SERPL CALCULATED.3IONS-SCNC: 6 MMOL/L (ref 4–16)
ANION GAP SERPL CALCULATED.3IONS-SCNC: 7 MMOL/L (ref 4–16)
ANION GAP SERPL CALCULATED.3IONS-SCNC: 9 MMOL/L (ref 4–16)
AST SERPL-CCNC: 25 IU/L (ref 15–37)
BASOPHILS ABSOLUTE: 0 K/CU MM
BASOPHILS RELATIVE PERCENT: 0.4 % (ref 0–1)
BILIRUB SERPL-MCNC: 0.3 MG/DL (ref 0–1)
BILIRUBIN DIRECT: 0.2 MG/DL (ref 0–0.3)
BILIRUBIN, INDIRECT: 0.1 MG/DL (ref 0–0.7)
BUN BLDV-MCNC: 6 MG/DL (ref 6–23)
BUN BLDV-MCNC: 6 MG/DL (ref 6–23)
BUN BLDV-MCNC: 7 MG/DL (ref 6–23)
CALCIUM SERPL-MCNC: 7.5 MG/DL (ref 8.3–10.6)
CALCIUM SERPL-MCNC: 7.7 MG/DL (ref 8.3–10.6)
CALCIUM SERPL-MCNC: 7.7 MG/DL (ref 8.3–10.6)
CALCIUM SERPL-MCNC: 8.1 MG/DL (ref 8.3–10.6)
CALCIUM SERPL-MCNC: 8.1 MG/DL (ref 8.3–10.6)
CHLORIDE BLD-SCNC: 86 MMOL/L (ref 99–110)
CHLORIDE BLD-SCNC: 87 MMOL/L (ref 99–110)
CHLORIDE BLD-SCNC: 92 MMOL/L (ref 99–110)
CO2: 24 MMOL/L (ref 21–32)
CO2: 26 MMOL/L (ref 21–32)
CO2: 26 MMOL/L (ref 21–32)
CREAT SERPL-MCNC: 0.6 MG/DL (ref 0.6–1.1)
CREAT SERPL-MCNC: 0.6 MG/DL (ref 0.6–1.1)
CREAT SERPL-MCNC: 0.7 MG/DL (ref 0.6–1.1)
DIFFERENTIAL TYPE: ABNORMAL
EKG ATRIAL RATE: 58 BPM
EKG DIAGNOSIS: NORMAL
EKG P AXIS: 96 DEGREES
EKG P-R INTERVAL: 178 MS
EKG Q-T INTERVAL: 468 MS
EKG QRS DURATION: 94 MS
EKG QTC CALCULATION (BAZETT): 459 MS
EKG R AXIS: 40 DEGREES
EKG T AXIS: 67 DEGREES
EKG VENTRICULAR RATE: 58 BPM
EOSINOPHILS ABSOLUTE: 0 K/CU MM
EOSINOPHILS RELATIVE PERCENT: 0 % (ref 0–3)
ESTIMATED AVERAGE GLUCOSE: 94 MG/DL
GFR AFRICAN AMERICAN: >60 ML/MIN/1.73M2
GFR NON-AFRICAN AMERICAN: >60 ML/MIN/1.73M2
GLUCOSE BLD-MCNC: 125 MG/DL (ref 70–99)
GLUCOSE BLD-MCNC: 136 MG/DL (ref 70–99)
GLUCOSE BLD-MCNC: 139 MG/DL (ref 70–99)
GLUCOSE BLD-MCNC: 150 MG/DL (ref 70–99)
GLUCOSE BLD-MCNC: 88 MG/DL (ref 70–99)
GLUCOSE BLD-MCNC: 97 MG/DL (ref 70–99)
GLUCOSE BLD-MCNC: 98 MG/DL (ref 70–99)
GLUCOSE BLD-MCNC: 99 MG/DL (ref 70–99)
HBA1C MFR BLD: 4.9 % (ref 4.2–6.3)
HCT VFR BLD CALC: 37.6 % (ref 37–47)
HEMOGLOBIN: 13.3 GM/DL (ref 12.5–16)
HIGH SENSITIVE C-REACTIVE PROTEIN: 0.4 MG/L (ref 0–5)
IMMATURE NEUTROPHIL %: 0.4 % (ref 0–0.43)
LYMPHOCYTES ABSOLUTE: 0.8 K/CU MM
LYMPHOCYTES RELATIVE PERCENT: 29.4 % (ref 24–44)
MCH RBC QN AUTO: 30.5 PG (ref 27–31)
MCHC RBC AUTO-ENTMCNC: 35.4 % (ref 32–36)
MCV RBC AUTO: 86.2 FL (ref 78–100)
MONOCYTES ABSOLUTE: 0.3 K/CU MM
MONOCYTES RELATIVE PERCENT: 13 % (ref 0–4)
NUCLEATED RBC %: 0 %
OSMOLALITY URINE: 378 MOS/L (ref 292–1090)
PDW BLD-RTO: 11.4 % (ref 11.7–14.9)
PLATELET # BLD: 112 K/CU MM (ref 140–440)
PMV BLD AUTO: 9.1 FL (ref 7.5–11.1)
POTASSIUM SERPL-SCNC: 3.5 MMOL/L (ref 3.5–5.1)
POTASSIUM SERPL-SCNC: 3.6 MMOL/L (ref 3.5–5.1)
POTASSIUM SERPL-SCNC: 3.8 MMOL/L (ref 3.5–5.1)
POTASSIUM SERPL-SCNC: 3.9 MMOL/L (ref 3.5–5.1)
POTASSIUM SERPL-SCNC: 4.1 MMOL/L (ref 3.5–5.1)
RBC # BLD: 4.36 M/CU MM (ref 4.2–5.4)
SEGMENTED NEUTROPHILS ABSOLUTE COUNT: 1.5 K/CU MM
SEGMENTED NEUTROPHILS RELATIVE PERCENT: 56.8 % (ref 36–66)
SODIUM BLD-SCNC: 117 MMOL/L (ref 135–145)
SODIUM BLD-SCNC: 118 MMOL/L (ref 135–145)
SODIUM BLD-SCNC: 119 MMOL/L (ref 135–145)
SODIUM BLD-SCNC: 119 MMOL/L (ref 135–145)
SODIUM BLD-SCNC: 124 MMOL/L (ref 135–145)
SODIUM BLD-SCNC: 126 MMOL/L (ref 135–145)
SODIUM BLD-SCNC: 126 MMOL/L (ref 135–145)
TOTAL IMMATURE NEUTOROPHIL: 0.01 K/CU MM
TOTAL NUCLEATED RBC: 0 K/CU MM
TOTAL PROTEIN: 5.6 GM/DL (ref 6.4–8.2)
VITAMIN D 25-HYDROXY: 43.06 NG/ML
WBC # BLD: 2.6 K/CU MM (ref 4–10.5)

## 2022-10-07 PROCEDURE — 2580000003 HC RX 258: Performed by: INTERNAL MEDICINE

## 2022-10-07 PROCEDURE — 80048 BASIC METABOLIC PNL TOTAL CA: CPT

## 2022-10-07 PROCEDURE — 6370000000 HC RX 637 (ALT 250 FOR IP): Performed by: STUDENT IN AN ORGANIZED HEALTH CARE EDUCATION/TRAINING PROGRAM

## 2022-10-07 PROCEDURE — 84443 ASSAY THYROID STIM HORMONE: CPT

## 2022-10-07 PROCEDURE — 84295 ASSAY OF SERUM SODIUM: CPT

## 2022-10-07 PROCEDURE — 85025 COMPLETE CBC W/AUTO DIFF WBC: CPT

## 2022-10-07 PROCEDURE — 94761 N-INVAS EAR/PLS OXIMETRY MLT: CPT

## 2022-10-07 PROCEDURE — 84439 ASSAY OF FREE THYROXINE: CPT

## 2022-10-07 PROCEDURE — 83880 ASSAY OF NATRIURETIC PEPTIDE: CPT

## 2022-10-07 PROCEDURE — 6370000000 HC RX 637 (ALT 250 FOR IP): Performed by: NURSE PRACTITIONER

## 2022-10-07 PROCEDURE — 2000000000 HC ICU R&B

## 2022-10-07 PROCEDURE — 82248 BILIRUBIN DIRECT: CPT

## 2022-10-07 PROCEDURE — 82962 GLUCOSE BLOOD TEST: CPT

## 2022-10-07 PROCEDURE — 51702 INSERT TEMP BLADDER CATH: CPT

## 2022-10-07 PROCEDURE — 80053 COMPREHEN METABOLIC PANEL: CPT

## 2022-10-07 PROCEDURE — 93010 ELECTROCARDIOGRAM REPORT: CPT | Performed by: INTERNAL MEDICINE

## 2022-10-07 PROCEDURE — 82306 VITAMIN D 25 HYDROXY: CPT

## 2022-10-07 RX ORDER — LEVOTHYROXINE SODIUM 0.15 MG/1
150 TABLET ORAL DAILY
Status: DISCONTINUED | OUTPATIENT
Start: 2022-10-07 | End: 2022-10-07

## 2022-10-07 RX ORDER — SODIUM CHLORIDE 9 MG/ML
INJECTION, SOLUTION INTRAVENOUS CONTINUOUS
Status: DISCONTINUED | OUTPATIENT
Start: 2022-10-07 | End: 2022-10-09

## 2022-10-07 RX ORDER — DEXTROSE MONOHYDRATE 50 MG/ML
INJECTION, SOLUTION INTRAVENOUS CONTINUOUS
Status: DISCONTINUED | OUTPATIENT
Start: 2022-10-07 | End: 2022-10-07

## 2022-10-07 RX ORDER — PROPAFENONE HYDROCHLORIDE 225 MG/1
225 CAPSULE, EXTENDED RELEASE ORAL 2 TIMES DAILY
Status: DISCONTINUED | OUTPATIENT
Start: 2022-10-07 | End: 2022-10-07

## 2022-10-07 RX ORDER — PROPAFENONE HYDROCHLORIDE 225 MG/1
225 CAPSULE, EXTENDED RELEASE ORAL 2 TIMES DAILY
Status: DISCONTINUED | OUTPATIENT
Start: 2022-10-07 | End: 2022-10-10 | Stop reason: HOSPADM

## 2022-10-07 RX ADMIN — FAMOTIDINE 20 MG: 20 TABLET ORAL at 20:06

## 2022-10-07 RX ADMIN — PROPAFENONE HYDROCHLORIDE 225 MG: 225 CAPSULE, EXTENDED RELEASE ORAL at 03:30

## 2022-10-07 RX ADMIN — FAMOTIDINE 20 MG: 20 TABLET ORAL at 09:41

## 2022-10-07 RX ADMIN — APIXABAN 5 MG: 5 TABLET, FILM COATED ORAL at 20:06

## 2022-10-07 RX ADMIN — APIXABAN 5 MG: 5 TABLET, FILM COATED ORAL at 09:41

## 2022-10-07 RX ADMIN — SODIUM CHLORIDE: 9 INJECTION, SOLUTION INTRAVENOUS at 19:00

## 2022-10-07 RX ADMIN — LEVOTHYROXINE SODIUM 150 MCG: 0.15 TABLET ORAL at 06:06

## 2022-10-07 RX ADMIN — APIXABAN 5 MG: 5 TABLET, FILM COATED ORAL at 00:03

## 2022-10-07 RX ADMIN — DEXTROSE MONOHYDRATE: 50 INJECTION, SOLUTION INTRAVENOUS at 07:06

## 2022-10-07 RX ADMIN — PROPAFENONE HYDROCHLORIDE 225 MG: 225 CAPSULE, EXTENDED RELEASE ORAL at 18:21

## 2022-10-07 ASSESSMENT — ENCOUNTER SYMPTOMS
NAUSEA: 1
EYE PAIN: 0
NAUSEA: 0
SHORTNESS OF BREATH: 0
EYES NEGATIVE: 1
RESPIRATORY NEGATIVE: 1
VOMITING: 0
CHEST TIGHTNESS: 0
STRIDOR: 0
ALLERGIC/IMMUNOLOGIC NEGATIVE: 1
CONSTIPATION: 0
CHOKING: 0
DIARRHEA: 0
VOMITING: 1
BLOOD IN STOOL: 0
COUGH: 0
WHEEZING: 0
BACK PAIN: 0
ABDOMINAL PAIN: 0
ABDOMINAL DISTENTION: 0

## 2022-10-07 ASSESSMENT — PAIN SCALES - GENERAL
PAINLEVEL_OUTOF10: 0

## 2022-10-07 NOTE — PROGRESS NOTES
Critical Care Progress Note 10/7/2022        Sonya Lutz  1940  [id number]      Assessment/Plan:    Hyponatremia  ? Bladder outlet obstruction  COVID-19, mainly GI related symptoms  Mild leukopenia, thrombocytopenia (suspect COVID related)  PAF  Hypothyroidism  GERD    Defer management of hyponatremia, rate of correction to Nephrology/medicine services. From my perspective, the patient could trsnfer out of ICU setting. Transfer care to [facility] 145 not advocate RX for COVID at this time given absence of significant respiratory compromise, relatively mild symptoms and patient/reluctance    DVT prophylaxis-Eliquis, Pepcid Ulcer prophylaxis    Nothing further to add, call if questions. E Miguel A  [phone]    Subjective:    Notes improvement of GI distress, admits to hunger. No acute overnight issues per discussion with nursing staff      Review of Systems   Constitutional:  Positive for fatigue. HENT:  Positive for congestion and postnasal drip. Eyes: Negative. Respiratory: Negative. Cardiovascular: Negative. Gastrointestinal:  Positive for nausea and vomiting. Endocrine: Negative. Genitourinary: Negative. Musculoskeletal: Negative. Skin: Negative. Allergic/Immunologic: Negative. Neurological: Negative. Hematological: Negative. Psychiatric/Behavioral: Negative. Physical Exam:          BP 83/64   Pulse 76   Temp 99 °F (37.2 °C) (Oral)   Resp 17   Ht 5' 4.5\" (1.638 m)   Wt 135 lb (61.2 kg)   SpO2 93%   BMI 22.81 kg/m²       General: Eldelry female, awake, alert, no distress vitals reviewed. Eyes: Normal pupils  ENT: neck supple  Cardiovascular: Regular rate. Respiratory: Clear to auscultation  Gastrointestinal: Soft, non tender  Genitourinary: no suprapubic tenderness  Musculoskeletal: No edema. Skin: warm, dry  Neuro: Alert, oriented, normal mentation, no focal deficits  Psych: Mood appropriate.     Current Medications: levothyroxine  150 mcg Oral Daily    propafenone  225 mg Oral BID    famotidine  20 mg Oral BID    insulin lispro  0-8 Units SubCUTAneous TID WC    apixaban  5 mg Oral BID       Labs, Imaging and Studies reviewed:    CT ABDOMEN PELVIS W IV CONTRAST Additional Contrast? None    Result Date: 10/6/2022  EXAMINATION: CT OF THE ABDOMEN AND PELVIS WITH CONTRAST 10/6/2022 5:03 pm TECHNIQUE: CT of the abdomen and pelvis was performed with the administration of intravenous contrast. Multiplanar reformatted images are provided for review. Automated exposure control, iterative reconstruction, and/or weight based adjustment of the mA/kV was utilized to reduce the radiation dose to as low as reasonably achievable. COMPARISON: None available. HISTORY: ORDERING SYSTEM PROVIDED HISTORY: abd pain TECHNOLOGIST PROVIDED HISTORY: Reason for exam:->abd pain Additional Contrast?->None Decision Support Exception - unselect if not a suspected or confirmed emergency medical condition->Emergency Medical Condition (MA) Reason for Exam: abd pain FINDINGS: Lower Chest: Dependent atelectasis noted bilaterally. Visualized lungs otherwise clear. Base of the heart unremarkable. Organs: Liver enhances normally. Gallbladder unremarkable. Portal vein patent. Spleen unremarkable. Adrenals unremarkable. Pancreas unremarkable. There is moderate right and mild left hydronephrosis, likely secondary to urinary bladder distention. Urothelial thickening is seen bilaterally, greater on the right. GI/Bowel: Small hiatal hernia. Stomach otherwise unremarkable. No small bowel abnormalities are identified. Moderate diverticulosis of the large bowel is seen, without convincing CT evidence of diverticulitis. Appendix is elongated, but the tip is gas-filled, and no asymmetric periappendiceal fat stranding is identified. Pelvis: Small to moderate amount of free pelvic fluid is identified.   There is moderate to marked urinary bladder distention, with urinary bladder measuring approximately 14 cm in cranial caudal dimension. Pelvic floor laxity is noted uterus is surgically absent. Peritoneum/Retroperitoneum: Abdominal aorta normal in caliber. Superior mesenteric artery is enhancing. No lymphadenopathy. Bones/Soft Tissues: No acute or suspicious bony abnormality identified. The extra-abdominal and extra pelvic soft tissues are unremarkable. Moderate right and mild left hydronephrosis, which is likely secondary to back pressure from urinary bladder distension. There is urothelial thickening, and therefore air tract infection/inflammation should be considered. Small to moderate amount of free pelvic fluid, which is presumably reactive. Moderate diverticulosis of the large bowel, but without convincing CT evidence of diverticulitis. Small hiatal hernia. XR CHEST PORTABLE    Result Date: 10/6/2022  EXAMINATION: ONE XRAY VIEW OF THE CHEST 10/6/2022 5:38 pm COMPARISON: 11/01/2020. HISTORY: ORDERING SYSTEM PROVIDED HISTORY: cough TECHNOLOGIST PROVIDED HISTORY: Reason for exam:->cough Reason for Exam: cough FINDINGS: The heart is enlarged. Vascular calcifications are noted along the aortic arch. There is a dextroscoliosis in the mid-lower thoracic spine. There is pulmonary vascular congestion. There is blunting of the right lateral costophrenic angle, stable, likely related to pleural scar. Atelectasis is noted in the lung bases. The lung apices are clear. The bones are osteopenic. 1. Mild pulmonary vascular congestion.        Recent Results (from the past 24 hour(s))   Brain Natriuretic Peptide    Collection Time: 10/06/22  5:20 PM   Result Value Ref Range    Pro-.9 (H) <300 PG/ML   CBC with Auto Differential    Collection Time: 10/06/22  5:29 PM   Result Value Ref Range    WBC 1.0 (LL) 4.0 - 10.5 K/CU MM    RBC 4.31 4.2 - 5.4 M/CU MM    Hemoglobin 13.3 12.5 - 16.0 GM/DL    Hematocrit 37.2 37 - 47 %    MCV 86.3 78 - 100 FL    MCH 30.9 27 - 31 PG MCHC 35.8 32.0 - 36.0 %    RDW 11.4 (L) 11.7 - 14.9 %    Platelets 913 (L) 048 - 440 K/CU MM    MPV 9.4 7.5 - 11.1 FL    Cells Counted 50     Metamyelocytes Relative 2 (H) 0.0 %    Bands Relative 10 5 - 11 %    Segs Relative 38.0 36 - 66 %    Basophils % 2.0 (H) 0 - 1 %    Lymphocytes % 38.0 24 - 44 %    Monocytes % 10.0 (H) 0 - 4 %    Metamyelocytes Absolute 0.02 K/CU MM    Bands Absolute 0.10 K/CU MM    Segs Absolute 0.4 K/CU MM    Basophils Absolute 0.0 K/CU MM    Lymphocytes Absolute 0.4 K/CU MM    Monocytes Absolute 0.1 K/CU MM    Differential Type MANUAL DIFFERENTIAL     PLT Morphology LARGE    Comprehensive Metabolic Panel    Collection Time: 10/06/22  5:29 PM   Result Value Ref Range    Sodium 114 (LL) 135 - 145 MMOL/L    Potassium 4.0 3.5 - 5.1 MMOL/L    Chloride 83 (L) 99 - 110 mMol/L    CO2 23 21 - 32 MMOL/L    BUN 9 6 - 23 MG/DL    Creatinine 0.5 (L) 0.6 - 1.1 MG/DL    Glucose 115 (H) 70 - 99 MG/DL    Calcium 7.8 (L) 8.3 - 10.6 MG/DL    Albumin 3.7 3.4 - 5.0 GM/DL    Total Protein 6.4 6.4 - 8.2 GM/DL    Total Bilirubin 0.4 0.0 - 1.0 MG/DL    ALT 20 10 - 40 U/L    AST 29 15 - 37 IU/L    Alkaline Phosphatase 71 40 - 129 IU/L    GFR Non-African American >60 >60 mL/min/1.73m2    GFR African American >60 >60 mL/min/1.73m2    Anion Gap 8 4 - 16   Troponin    Collection Time: 10/06/22  5:29 PM   Result Value Ref Range    Troponin T <0.010 <0.01 NG/ML   Lipase    Collection Time: 10/06/22  5:29 PM   Result Value Ref Range    Lipase 122 (H) 13 - 60 IU/L   Lactic Acid    Collection Time: 10/06/22  5:29 PM   Result Value Ref Range    Lactate 0.8 0.4 - 2.0 mMOL/L   Hemoglobin A1c    Collection Time: 10/06/22  5:29 PM   Result Value Ref Range    Hemoglobin A1C 4.9 4.2 - 6.3 %    eAG 94 mg/dL   Urinalysis    Collection Time: 10/06/22  6:46 PM   Result Value Ref Range    Color, UA YELLOW     Clarity, UA CLEAR     Glucose, Urine NEGATIVE MG/DL    Bilirubin Urine NEGATIVE     Ketones, Urine 40 MG/DL    Specific Gravity, UA 1.015     Blood, Urine NEGATIVE     pH, Urine 6.0     Protein, UA NEGATIVE MG/DL    Urobilinogen, Urine 0.2 MG/DL    Nitrite Urine, Quantitative NEGATIVE     Leukocyte Esterase, Urine TRACE    Microscopic Urinalysis    Collection Time: 10/06/22  6:46 PM   Result Value Ref Range    RBC, UA <1 /HPF    WBC, UA 6 /HPF    Bacteria, UA NEGATIVE /HPF    Squam Epithel, UA 1 /HPF    Trichomonas, UA NONE SEEN /HPF   Osmolality, Urine    Collection Time: 10/06/22  6:46 PM   Result Value Ref Range    Osmolality, Ur 378 292 - 1090 MOS/L   Sodium, urine, random    Collection Time: 10/06/22  6:46 PM   Result Value Ref Range    Sodium, Ur 73 35 - 167 MMOL/L   COVID-19, Rapid    Collection Time: 10/06/22  8:05 PM    Specimen: Nasopharyngeal   Result Value Ref Range    Source UNKNOWN     SARS-CoV-2, NAAT DETECTED (A) NOT DETECTED   Osmolality    Collection Time: 10/06/22  8:09 PM   Result Value Ref Range    Osmolality 258 (L) 280 - 300 MOS/L   C-Reactive Protein    Collection Time: 10/06/22 10:16 PM   Result Value Ref Range    CRP, High Sensitivity 0.4 0.0 - 5.0 mg/L   Sedimentation Rate    Collection Time: 10/06/22 10:16 PM   Result Value Ref Range    Sed Rate 1 0 - 30 MM/HR   Sodium    Collection Time: 10/06/22 10:16 PM   Result Value Ref Range    Sodium 123 (L) 135 - 145 MMOL/L   POCT Glucose    Collection Time: 10/06/22 11:22 PM   Result Value Ref Range    POC Glucose 96 70 - 99 MG/DL   Sodium    Collection Time: 10/07/22  3:00 AM   Result Value Ref Range    Sodium 126 (L) 135 - 145 MMOL/L   Basic Metabolic Panel w/ Reflex to MG    Collection Time: 10/07/22  4:30 AM   Result Value Ref Range    Sodium 126 (L) 135 - 145 MMOL/L    Potassium 3.6 3.5 - 5.1 MMOL/L    Chloride 92 (L) 99 - 110 mMol/L    CO2 24 21 - 32 MMOL/L    Anion Gap 10 4 - 16    BUN 6 6 - 23 MG/DL    Creatinine 0.6 0.6 - 1.1 MG/DL    Glucose 88 70 - 99 MG/DL    Calcium 8.1 (L) 8.3 - 10.6 MG/DL    GFR Non-African American >60 >60 mL/min/1.73m2    GFR African American >60 >60 mL/min/1.73m2   CBC with Auto Differential    Collection Time: 10/07/22  4:30 AM   Result Value Ref Range    WBC 2.6 (L) 4.0 - 10.5 K/CU MM    RBC 4.36 4.2 - 5.4 M/CU MM    Hemoglobin 13.3 12.5 - 16.0 GM/DL    Hematocrit 37.6 37 - 47 %    MCV 86.2 78 - 100 FL    MCH 30.5 27 - 31 PG    MCHC 35.4 32.0 - 36.0 %    RDW 11.4 (L) 11.7 - 14.9 %    Platelets 446 (L) 719 - 440 K/CU MM    MPV 9.1 7.5 - 11.1 FL    Differential Type AUTOMATED DIFFERENTIAL     Segs Relative 56.8 36 - 66 %    Lymphocytes % 29.4 24 - 44 %    Monocytes % 13.0 (H) 0 - 4 %    Eosinophils % 0.0 0 - 3 %    Basophils % 0.4 0 - 1 %    Segs Absolute 1.5 K/CU MM    Lymphocytes Absolute 0.8 K/CU MM    Monocytes Absolute 0.3 K/CU MM    Eosinophils Absolute 0.0 K/CU MM    Basophils Absolute 0.0 K/CU MM    Nucleated RBC % 0.0 %    Total Nucleated RBC 0.0 K/CU MM    Total Immature Neutrophil 0.01 K/CU MM    Immature Neutrophil % 0.4 0 - 0.43 %   Vitamin D 25 Hydroxy    Collection Time: 10/07/22  4:30 AM   Result Value Ref Range    Vit D, 25-Hydroxy 43.06 >20 NG/ML   Hepatic Function Panel    Collection Time: 10/07/22  4:30 AM   Result Value Ref Range    Albumin 3.4 3.4 - 5.0 GM/DL    Total Bilirubin 0.3 0.0 - 1.0 MG/DL    Bilirubin, Direct 0.2 0.0 - 0.3 MG/DL    Bilirubin, Indirect 0.1 0 - 0.7 MG/DL    Alkaline Phosphatase 70 40 - 129 IU/L    AST 25 15 - 37 IU/L    ALT 19 10 - 40 U/L    Total Protein 5.6 (L) 6.4 - 8.2 GM/DL   Sodium    Collection Time: 10/07/22  7:10 AM   Result Value Ref Range    Sodium 124 (L) 135 - 145 MMOL/L

## 2022-10-07 NOTE — PROGRESS NOTES
During evaluation via [facility] with Dr. Herndon, patient agreed to [phone] treatment with Remdesivir  and pharmacy consulted for dosing. Medications is timed for tomorrow as patient's family (son) is requesting for more information and discussion with medical team in the morning before medication can be started. Pt's son refused medication dose administration tonight. CRP pending currently.  CC-attending updated     [id number] NP  [facility]

## 2022-10-07 NOTE — PROGRESS NOTES
Patient seen and examined and chart briefly  Hyponatremia-unknown duration-based on the history likely at least since Sunday-her sodium increased about 12 mEq less than 12 hours-likely from a normal saline bolus with suppression of ADH  Normal saline has been stopped  Start D5 water at 3 mL/kg/h-roughly 200 mL an hour  Will see the next sodium level-if still more than 120-then add 2 mcg IV DDAVP  Bottom line the goal would be to lower the sodium roughly  to 122 mEq/L(restricting sodium increase not more than 8 mEq/L per 24 hours-to reduce the risk of osmotic demyelination syndrome)-at 6: 30 PM  Full consult to come

## 2022-10-07 NOTE — ED NOTES
ED TO INPATIENT SBAR HANDOFF    Patient Name: Zhou Nowak   :  1940  78 y.o. MRN:  [id number]  Preferred Name  Erin Harris  ED Room #:  ED33/ED-33  Caregiver Present no   Restraints no   Sitter no   Sepsis Risk Score Sepsis Risk Score: 1.34    Situation  Code Status: Full Code    Allergies: Vicodin [hydrocodone-acetaminophen]  Weight: Patient Vitals for the past 96 hrs (Last 3 readings):   Weight   10/06/22 1637 135 lb (61.2 kg)     Arrived from: home  Chief Complaint:   Chief Complaint   Patient presents with    Emesis    Nausea    Fatigue     Hospital Problem/Diagnosis:  Active Problems:    * No active hospital problems. *  Resolved Problems:    * No resolved hospital problems. *    Imaging:   XR CHEST PORTABLE   Final Result   1. Mild pulmonary vascular congestion.          CT ABDOMEN PELVIS W IV CONTRAST Additional Contrast? None    (Results Pending)     Abnormal labs:   Abnormal Labs Reviewed   CBC WITH AUTO DIFFERENTIAL - Abnormal; Notable for the following components:       Result Value    WBC 1.0 (*)     RDW 11.4 (*)     Platelets 936 (*)     Metamyelocytes Relative 2 (*)     Basophils % 2.0 (*)     Monocytes % 10.0 (*)     All other components within normal limits   COMPREHENSIVE METABOLIC PANEL - Abnormal; Notable for the following components:    Sodium 114 (*)     Chloride 83 (*)     Creatinine 0.5 (*)     Glucose 115 (*)     Calcium 7.8 (*)     All other components within normal limits   LIPASE - Abnormal; Notable for the following components:    Lipase 122 (*)     All other components within normal limits   BRAIN NATRIURETIC PEPTIDE - Abnormal; Notable for the following components:    Pro-.9 (*)     All other components within normal limits     Critical values: yes     Abnormal Assessment Findings: Nausea, vomiting, diarrhea since [facility] admissions in last 30 days?  no   History:   Past Medical History:   Diagnosis Date    AF (atrial fibrillation) (HCC)     Allergic rhinitis due to other allergen     Anxiety     Arthritis     Arthritis of big toe 6/12    Dr Lexi Cortes- bilateral, treating w orthotics    B12 deficiency     BCC (basal cell carcinoma), face 8/13/13    L cheek- Dr Cintron Deal tunnel syndrome     Congenital prolapse of bladder mucosa     DDD (degenerative disc disease), lumbar     Female incontinence     H/O cardiovascular stress test 1/14/2010, 2/6/2007 1/14/2010-Normal perfusion. Normal LVSF. EF 70%;    H/O echocardiogram 1/7/2010, 2/6/2007 1/7/2010-Mild concentric LVH. LVSF normal. EF =>55%.  H/O echocardiogram 03/09/2020    EF 97-02%, Grade I Diastolic Dysfunction, sclerotic but non stenotic aortic valve, Mild Pulm HTN, no pericardial effusion     Hiatal hernia     noted on UGI 10/2020    History of Doppler ultrasound     8/19/2009-Renal US- Normal    Homocysteinemia     HX OTHER MEDICAL     8/4/2010-48 HR HOLTER MONITOR-Pred rhythm is sinus. No episodes of sustained ectopy seen. 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 she is to watch for acquired risk factors-Dr Nel Moon Osteoarthritis of foot, right     Osteoporosis     SHE REFUSED MEDS, FOSAMAX--- 8/21/17    Pain in shoulder     SBE (subacute bacterial endocarditis) prophylaxis candidate     VHD (valvular heart disease)     Severe Tricuspid Regurgitation (11/4/2005);  Mild Mitral regurgitation    Vitamin D deficiency        Assessment    Vitals/MEWS: MEWS Score: 1  Level of Consciousness: Alert (0)   Vitals:    10/06/22 1637   BP: (!) 143/93   Pulse: 59   Resp: 15   Temp: 98.3 °F (36.8 °C)   TempSrc: Oral   SpO2: 97%   Weight: 135 lb (61.2 kg)   Height: 5' 4.5\" (1.638 m)       O2 Flow Rate: O2 Device: None (Room air)    Cardiac Rhythm:    Pain Assessment: 2 [x] Verbal [] Jenene Alken Scale  Pain Scale:    Last documented pain score (0-10 scale)    Last documented pain medication administered: none  Mental Status: oriented, alert, coherent, logical, thought processes intact, and able to concentrate and follow conversation  C-SSRS: Risk of Suicide: No Risk  Bedside swallow:    Vinny Coma Scale (GCS): Vinny Coma Scale  Eye Opening: Spontaneous  Best Verbal Response: Oriented  Best Motor Response: Obeys commands  Vinny Coma Scale Score: 15  Active LDA's:   Peripheral IV 10/06/22 Right Antecubital (Active)     PO Status: Regular  Pertinent or High Risk Medications/Drips: no   o If Yes, please provide details: none  Pending Blood Product Administration: no     Blood Cultures: see ED pt care timeline or ED Event log    Recommendation    Pending orders Insulin, pepcid  Plan for Discharge (if known):    Additional Comments: none   If any further questions, please call Sending RN at [phone]    Electronically signed by: Electronically signed by Demarco Tejeda RN on 10/6/2022 at 8:25 PM       Demarco Tejeda RN  10/06/22 2027       Demarco Tejeda RN  10/06/22 2161

## 2022-10-07 NOTE — PROGRESS NOTES
Information given to son Srini Stevens per Patients approval. Srini Stevens was given update about patient status and requested what medicines she would be receiving. This RN explained that she will be receiving remdesivir via IV. According to Srini Stevens, patient will say yes to medications and might not understand what they're for. Srini Hilda advised me family wishes are not for her to receive medication as a \"Definite no. \" I informed Magaly Faust about situation. Dr. Dax De León was notified by Katie Mensah NP. Per Dr. Dax De León they will hold medication tonight until AM when family can talk to the attending Dr during rounds. This RN explained to [place]. Patient is alert and able to answer and follow simple commands at this time.

## 2022-10-07 NOTE — CARE COORDINATION
Pt has met [facility] inpatient criteria for hyponatremia.  ROBERT,RN/CM

## 2022-10-07 NOTE — H&P
I have seen this patient by tele medicine, Telemedicine visit conducted with me being in [facility] and patient in the hospital in [place]. I examined the patient with help with the bedside nurse. I have reviewed and agree with note by SYLVAIN as detailed below. Pt is a 79 yo F being admitted to the ICU for severe hyponatremia and COVID+ w/ pmh + for PAF on Eliquis and Propafenone, Hypothyroidism, GERD, and HLD. HPI:  Complains of 5-days of sinus congestion, cough, diarrhea c/b dehydration, and severe urinary retention over the past 24 hours leading her to call her PCP after testing positive for COVID on 10/6. She was given a script for Paxlovid, steroids, and Lomotil, however her vomiting and diarrhea worsened, and she was advised to come to the ED for further evaluation. She never picked up her scripts. She reports fevers initially with periodic chills and occasional abdominal tightness. Denies chest pain, LANGFORD, or bloody stools. Vitals:   Vitals:    10/06/22 1637   BP: (!) 143/93   Pulse: 59   Resp: 15   Temp: 98.3 °F (36.8 °C)   TempSrc: Oral   SpO2: 97%   Weight: 135 lb (61.2 kg)   Height: 5' 4.5\" (1.638 m)     Exam is performed through direct observation by video conferencing along with assistance from the bedside nurse and a bluetooth stethoscope. General: NAD  Eyes: EOMI  ENT: neck supple, Dry mucus membranes  Cardiovascular: Regular rate. Respiratory: Clear to auscultation  Gastrointestinal: Soft, non tender, No guarding   Genitourinary: no suprapubic tenderness   Musculoskeletal: No edema  Skin: warm, dry  Neuro: Alert. Psych: Mood appropriate.           Impression   Severe Hyponatremia, 114, s/p 500 cc NS in ED and 75 cc/hr per nephrology  -Stat labs now, Q4hr with goal correction of no more than 8  in 24 hours  -Stop FLuids  -Appreciate Nephrology recs    COVID +, Mild/Mod, Unvaccinated with High Risk of Progression  -initiate Remdesivir given >65, Un-vaccinated  -CRP, trend daily  -No D-dimer given on Eliquis  -monitor closely with plan to initiate dexamethasone if develops oxygen requirement    Leukopenia/Thrombocytopenia 2/2 above    Severe Diarrhea c/b dehydration likely 2/2 above with CTAP w/o evidence of active colitis    Urinary Retention w/ evidence of mild/mod hydronephrosis with improvement of voiding in ED and no THA  - bladder scan q4h with low threshold to place a escamilla    PAF on Eliquis and Propafenone, WC,   -EKG  -C/w home meds     I personally saw the patient and independently provided 35 minutes of non-concurrent critical care out of the total shared critical care time provided. V2.0  History and Physical      Name:  Peggy Martinez /Age/Sex: 1940  (78 y.o. female)   MRN & CSN:  [id number] & [id number] Encounter Date/Time: 10/6/2022 9:41 PM EDT   Location:  ED33/ED-33 PCP: Charlie Lozano MD       Hospital Day: 1    Assessment and Plan: Peggy Martinez is a 78 y.o. female with a pmh of A. fib on Eliquis who presents with Hyponatremia after several days of nausea associated with vomiting, as well as diarrhea, likely related to COVID 19 viral infection      Hospital Problems             Last Modified POA    * (Principal) Hyponatremia 10/6/2022 Yes       Hyponatremia  COVID viral infection  Dehydration, secondary to diarrhea, nausea with vomiting, and poor p.o. intake  Leukopenia, likely secondary to COVID infection  Thrombocytopenia, likely secondary to COVID infection  Urinary retention, as evidenced by recent blood on CT scan of abdomen pelvis, with frequent urination associated with poor bladder emptying  Chronic conditions:   Atrial fibrillation on Eliquis  Osteoarthritis, osteoporosis  Degenerative disc disease of lumbar spine s/p   S/p bladder suspension   Hysterectomy    Plan:  Admitted to ICU  Nephrology consulted by ER provider, patient received a bolus of 500 mL sodium chloride, followed by continuous IV fluids 0.9 NS at 75 mL/hr.   Check sodium level every 4 hours, adjust treatment accordingly. Goal not to exceed sodium correction/increase 8 to 10 mEq per 24 hours, appreciate nephrology input  Daily labs, replace electrolytes as needed  Resume Eliquis  Accu-Cheks with ISS/hypoglycemia protocol, patient with poor p.o. intake  Bladder scan, if persistent urinary retention plan for Nixon catheter insertion for bladder and left ureter decompression. Antiemetics as needed  Remdesivir x 5days  DVT prophylaxis: Patient on Eliquis which is resumed  GI prophylaxis: Pepcid  Level of care: full code  Continue supportive care    Critical care time, excluding time doing procedures, was 32 minutes      Disposition:   Current Living situation: Home   Expected Disposition: Home   Estimated D/C: TBD    Diet ADULT DIET; Regular   DVT Prophylaxis [] Lovenox, []  Heparin, [] SCDs, [] Ambulation,  [x] Eliquis, [] Xarelto, [] Coumadin   Code Status Full Code   Surrogate Decision Maker/ POA Self and her daughter      History from:     patient, family member - daughter      atrial fibrillation on Eliquis, Osteoarthritis, osteoporosis, degenerative disc disease of lumbar spine, s/p bladder suspension 2009, and hysterectomy    History of Present Illness:     Chief Complaint: Hyponatremia  Nancy Pineda is a 78 y.o. female with pmh of atrial fibrillation on Eliquis, Osteoarthritis, osteoporosis, degenerative disc disease of lumbar spine, s/p bladder suspension 2009, and hysterectomy who presents with complaints of several days of nausea, vomiting, and diarrhea. CT scan of abdomen/pelvis showed a distended bladder with left ureteral compression, without intra-abdominal acute process. Labs showed low sodium at 114, WBC 0.1, and low platelets 489. Serum osmolarity 258. Normal troponin, and . Lipase is elevated at 122. Nephrology consulted by ER provider, they recommended a bolus of 500 mm of 0.9 sodium chloride followed by 0.9 NS infusion at 75 mill per hour.   Critical care consulted to admit patient for close monitoring and serial labs. Repeat labs showed sodium of 123. Sodium infusion stopped. Nephrology being updated by nursing staff for plan of care adjustment. Labs, images, records reviewed, and patient is examined in the ER and on arrival to ICU. Of note, patient tested positive for COVID-19 viral infection, will be started on remdesivir treatment. Currently asymptomatic, no respiratory issues, no oxygen requirement. Review of Systems: Need 10 Elements     ROS completed as in HPI, otherwise negative    Objective:   No intake or output data in the 24 hours ending 10/06/22 2141   Vitals:   Vitals:    10/06/22 1637   BP: (!) 143/93   Pulse: 59   Resp: 15   Temp: 98.3 °F (36.8 °C)   TempSrc: Oral   SpO2: 97%   Weight: 135 lb (61.2 kg)   Height: 5' 4.5\" (1.638 m)       Medications Prior to Admission     Prior to Admission medications    Medication Sig Start Date End Date Taking? Authorizing Provider   nirmatrelvir/ritonavir (PAXLOVID, 300/100,) 20 x 150 MG & 10 x 100MG TBPK Take 3 tablets (two 150 mg nirmatrelvir and one 100 mg ritonavir tablets) by mouth every 12 hours for 5 days. 10/6/22   Tian Potter MD   predniSONE (DELTASONE) 5 MG tablet Take 6 tablets by mouth on day 1, 5 on day 2, 4 on day 3, 3 on day 4, 2 on day 5, 1 on day 6. 10/6/22   Tian Potter MD   guaiFENesin-codeine (TUSSI-ORGANIDIN NR) 100-10 MG/5ML syrup Take 5 mLs by mouth 4 times daily as needed for Cough or Congestion for up to 7 days. 10/6/22 10/13/22  Tian Potter MD   diphenoxylate-atropine (DIPHENATOL) 2.5-0.025 MG per tablet Take 1 tablet by mouth in the morning and at bedtime for 5 days. 10/6/22 10/11/22  Tian Potter MD   SYNTHROID 137 MCG tablet Take 1 tablet by mouth Daily [place] brand synthroid only 9/7/22   Tian Potter MD   propafenone (RYTHMOL) 225 MG tablet Take 1 tablet by mouth in the morning and 1 tablet before bedtime.  7/29/22 10/27/22  Tian Potter MD ELIQUIS 5 MG TABS tablet Take 1 tablet by mouth 2 times daily 3/14/22   RADHA Donnelly - CNP   levothyroxine (SYNTHROID) 150 MCG tablet Take 1 tablet by mouth daily 3/4/22   Jules Felipe MD   pantoprazole (PROTONIX) 20 MG tablet Take 1 tablet by mouth every morning (before breakfast) 2/24/21   Jules Felipe MD   acetaminophen (APAP EXTRA STRENGTH) 500 MG tablet Take 1 tablet by mouth every 6 hours as needed for Pain 11/1/20   TJ Flor   Cyanocobalamin (B-12) 1000 MCG CAPS Take by mouth    Historical Provider, MD   Cholecalciferol (VITAMIN D3) 1000 UNITS TABS Take 1 tablet by mouth daily. 2/10/14   Jules Felipe MD       Physical Exam: Need 8 Elements     General: NAD  Eyes: EOMI  ENT: neck supple  Cardiovascular: Regular rate. Respiratory: Clear to auscultation  Gastrointestinal: Soft, non tender  Genitourinary: no suprapubic tenderness  Musculoskeletal: No edema  Skin: warm, dry  Neuro: Alert. Psych: Mood appropriate. Past Medical History:   PMHx   Past Medical History:   Diagnosis Date    AF (atrial fibrillation) (HCC)     Allergic rhinitis due to other allergen     Anxiety     Arthritis     Arthritis of big toe 6/12    Dr Kirk Giraldo- bilateral, treating w orthotics    B12 deficiency     BCC (basal cell carcinoma), face 8/13/13    L cheek- Dr Foreman Kayser tunnel syndrome     Congenital prolapse of bladder mucosa     DDD (degenerative disc disease), lumbar     Female incontinence     H/O cardiovascular stress test 1/14/2010, 2/6/2007 1/14/2010-Normal perfusion. Normal LVSF. EF 70%;    H/O echocardiogram 1/7/2010, 2/6/2007 1/7/2010-Mild concentric LVH. LVSF normal. EF =>55%.     H/O echocardiogram 03/09/2020    EF 25-09%, Grade I Diastolic Dysfunction, sclerotic but non stenotic aortic valve, Mild Pulm HTN, no pericardial effusion     Hiatal hernia     noted on UGI 10/2020    History of Doppler ultrasound     8/19/2009-Renal US- Normal    Homocysteinemia     HX OTHER MEDICAL     2010-48 HR HOLTER MONITOR-Pred rhythm is sinus. No episodes of sustained ectopy seen. 2005-30 DAY EVENT MONITOR- Atrial fibrillation; Hyperlipidemia     Hypothyroid     Lumbar herniated disc 3/2011    on MRI L1-2 and L4-5    Menopause     seeing Dr Loyda Lowe    MTHFR mutation (methylenetetrahydrofolate reductase)     gene was heterozygous-maybe slightly  increased risk of DVT or clotting problem. she is to watch for acquired risk factors-Dr Lynsey Hernandez    Osteoarthritis of foot, right     Osteoporosis     SHE REFUSED MEDS, FOSAMAX--- 15    Pain in shoulder     SBE (subacute bacterial endocarditis) prophylaxis candidate     VHD (valvular heart disease)     Severe Tricuspid Regurgitation (2005); Mild Mitral regurgitation    Vitamin D deficiency      PSHX:  has a past surgical history that includes  section (1965); Tonsillectomy (1943); Hysterectomy (10/21/2009); Rectocele repair (2011); Cataract removal with implant (Bilateral, 2012); bladder suspension (2009); Finger trigger release (Right); Skin cancer excision (Left, 2013); and Wrist fracture surgery (Left, 2020). Allergies: Allergies   Allergen Reactions    Vicodin [Hydrocodone-Acetaminophen] Anaphylaxis     Fam HX:  family history includes Cancer in her maternal aunt, maternal uncle, mother, and another family member; Clotting Disorder in her paternal aunt, paternal uncle, paternal uncle, and paternal uncle; Diabetes in her father, mother, and paternal grandmother; Other in her father and another family member.   Soc HX:   Social History     Socioeconomic History    Marital status:      Spouse name: None    Number of children: 6    Years of education: None    Highest education level: None   Occupational History    Occupation: retired     Comment: former teacher   Tobacco Use    Smoking status: Never    Smokeless tobacco: Never   Vaping Use    Vaping Use: Never used   Substance and Sexual Activity    Alcohol use: Yes     Comment: occassional wine    Drug use: No    Sexual activity: Yes     Partners: Male     Comment:      Social Determinants of Health     Financial Resource Strain: Low Risk     Difficulty of Paying Living Expenses: Not hard at all   Food Insecurity: No Food Insecurity    Worried About Running Out of Food in the Last Year: Never true    Ran Out of Food in the Last Year: Never true   Physical Activity: Inactive    Days of Exercise per Week: 0 days    Minutes of Exercise per Session: 0 min       Medications:   Medications:    [START ON 10/7/2022] enoxaparin  40 mg SubCUTAneous Daily    famotidine  20 mg Oral BID    [START ON 10/7/2022] insulin lispro  0-8 Units SubCUTAneous TID WC    insulin lispro  0-4 Units SubCUTAneous Nightly      Infusions:    sodium chloride 75 mL/hr at 10/06/22 1936    dextrose       PRN Meds: glucose, 4 tablet, PRN  dextrose bolus, 125 mL, PRN   Or  dextrose bolus, 250 mL, PRN  glucagon (rDNA), 1 mg, PRN  dextrose, , Continuous PRN  ondansetron, 4 mg, Q8H PRN   Or  ondansetron, 4 mg, Q6H PRN  polyethylene glycol, 17 g, Daily PRN      Labs      CBC:   Recent Labs     10/06/22  1729   WBC 1.0*   HGB 13.3   *     BMP:    Recent Labs     10/06/22  1729   *   K 4.0   CL 83*   CO2 23   BUN 9   CREATININE 0.5*   GLUCOSE 115*     Hepatic:   Recent Labs     10/06/22  1729   AST 29   ALT 20   BILITOT 0.4   ALKPHOS 71     Lipids:   Lab Results   Component Value Date/Time    CHOL 166 09/06/2022 10:28 AM    HDL 49 09/06/2022 10:28 AM    TRIG 106 09/06/2022 10:28 AM     Hemoglobin A1C: No results found for: LABA1C  TSH:   Lab Results   Component Value Date/Time    TSH 0.16 09/06/2022 10:28 AM     Troponin:   Lab Results   Component Value Date/Time    TROPONINT <0.010 10/06/2022 05:29 PM    TROPONINT <0.010 12/25/2018 06:25 AM     Lactic Acid: No results for input(s): LACTA in the last 72 hours.   BNP:   Recent Labs     10/06/22  1720   PROBNP 481.9*     UA:  Lab Results   Component Value Date/Time    NITRU NEGATIVE 10/06/2022 06:46 PM    COLORU YELLOW 10/06/2022 06:46 PM    PHUR 6.5 07/22/2021 12:01 PM    WBCUA 6 10/06/2022 06:46 PM    RBCUA <1 10/06/2022 06:46 PM    TRICHOMONAS NONE SEEN 10/06/2022 06:46 PM    BACTERIA NEGATIVE 10/06/2022 06:46 PM    CLARITYU CLEAR 10/06/2022 06:46 PM    SPECGRAV 1.015 10/06/2022 06:46 PM    LEUKOCYTESUR TRACE 10/06/2022 06:46 PM    UROBILINOGEN 0.2 10/06/2022 06:46 PM    BILIRUBINUR NEGATIVE 10/06/2022 06:46 PM    BILIRUBINUR neg 07/22/2021 12:01 PM    BLOODU NEGATIVE 10/06/2022 06:46 PM    GLUCOSEU neg 07/22/2021 12:01 PM    KETUA 40 10/06/2022 06:46 PM     Urine Cultures: No results found for: LABURIN  Blood Cultures: No results found for: BC  No results found for: BLOODCULT2  Organism: No results found for: ORG    Imaging/Diagnostics Last 24 Hours   CT ABDOMEN PELVIS W IV CONTRAST Additional Contrast? None    Result Date: 10/6/2022  EXAMINATION: CT OF THE ABDOMEN AND PELVIS WITH CONTRAST 10/6/2022 5:03 pm TECHNIQUE: CT of the abdomen and pelvis was performed with the administration of intravenous contrast. Multiplanar reformatted images are provided for review. Automated exposure control, iterative reconstruction, and/or weight based adjustment of the mA/kV was utilized to reduce the radiation dose to as low as reasonably achievable. COMPARISON: None available. HISTORY: ORDERING SYSTEM PROVIDED HISTORY: abd pain TECHNOLOGIST PROVIDED HISTORY: Reason for exam:->abd pain Additional Contrast?->None Decision Support Exception - unselect if not a suspected or confirmed emergency medical condition->Emergency Medical Condition (MA) Reason for Exam: abd pain FINDINGS: Lower Chest: Dependent atelectasis noted bilaterally. Visualized lungs otherwise clear. Base of the heart unremarkable. Organs: Liver enhances normally. Gallbladder unremarkable. Portal vein patent. Spleen unremarkable. Adrenals unremarkable.   Pancreas unremarkable. There is moderate right and mild left hydronephrosis, likely secondary to urinary bladder distention. Urothelial thickening is seen bilaterally, greater on the right. GI/Bowel: Small hiatal hernia. Stomach otherwise unremarkable. No small bowel abnormalities are identified. Moderate diverticulosis of the large bowel is seen, without convincing CT evidence of diverticulitis. Appendix is elongated, but the tip is gas-filled, and no asymmetric periappendiceal fat stranding is identified. Pelvis: Small to moderate amount of free pelvic fluid is identified. There is moderate to marked urinary bladder distention, with urinary bladder measuring approximately 14 cm in cranial caudal dimension. Pelvic floor laxity is noted uterus is surgically absent. Peritoneum/Retroperitoneum: Abdominal aorta normal in caliber. Superior mesenteric artery is enhancing. No lymphadenopathy. Bones/Soft Tissues: No acute or suspicious bony abnormality identified. The extra-abdominal and extra pelvic soft tissues are unremarkable. Moderate right and mild left hydronephrosis, which is likely secondary to back pressure from urinary bladder distension. There is urothelial thickening, and therefore air tract infection/inflammation should be considered. Small to moderate amount of free pelvic fluid, which is presumably reactive. Moderate diverticulosis of the large bowel, but without convincing CT evidence of diverticulitis. Small hiatal hernia. XR CHEST PORTABLE    Result Date: 10/6/2022  EXAMINATION: ONE XRAY VIEW OF THE CHEST 10/6/2022 5:38 pm COMPARISON: 11/01/2020. HISTORY: ORDERING SYSTEM PROVIDED HISTORY: cough TECHNOLOGIST PROVIDED HISTORY: Reason for exam:->cough Reason for Exam: cough FINDINGS: The heart is enlarged. Vascular calcifications are noted along the aortic arch. There is a dextroscoliosis in the mid-lower thoracic spine. There is pulmonary vascular congestion.   There is blunting of the right lateral costophrenic angle, stable, likely related to pleural scar. Atelectasis is noted in the lung bases. The lung apices are clear. The bones are osteopenic. 1. Mild pulmonary vascular congestion.      Personally reviewed Lab Studies, Imaging, and discussed case with Dr. Margy Killian    Electronically signed by Corinne Ga DTR on 10/6/2022 at 9:41 PM

## 2022-10-07 NOTE — CONSULTS
Patient: Raza Baum    Date:  10/07/22  :  1940, 78 y.o. Nephrologist: Kaitlynn Hopson MD  Provider: Norma Buchanan MD    Reason for Consult: Hyponatremia    Consult requested by : Dr Sulaiman Moreno, PACynthiaC      Chief Complaint:   Nausea vomiting diarrhea cough and not feeling well since     [address]  AND  brief background history    Ms. Felicia Sorensen, is an 78-year-old female, who was brought to the emergency department by her  with above-mentioned symptoms since . Last  she started having some cough and congestion, followed by decreased urine output, also decreased oral intake. That was followed by nausea and vomiting on  also diarrhea later on. Because of her symptom she did test for COVID and was positive this Wednesday. I am unsure what kind of outpatient therapy she had for COVID-19. He did contact her primary care and was advised to drink more fluid, she was unable to keep things down. Finally her  drove her to the emergency department    On arrival in the emergency department, she was afebrile and hemodynamically stable, and her oxygen saturation by pulse oximetry was 97%, while breathing on ambient air. She underwent several diagnostic test, which include imaging and biochemical.  Imaging study mainly chest x-ray and CT of the abdomen and pelvis showed bilateral hydro more in the right than the left with distended bladder, and haziness in the chest x-ray likely from COVID pneumonitis. Biochemical testing showed sodium of 114 with concomitant low chloride of 83, BUN/creatinine 9 and 0.5 mg/dL respectively. Her most recent sodium was 141 back in 2022. Other pertinent abnormal lab include severe leukopenia with white count of 1000 and low platelet 406,574. Her most recent white count and platelet was within normal range.   Additional abnormal lab include slightly elevated proBNP of 481, lipase of 122 and her UA was rather bland with specific gravity 1.015. No active sediment. She was given 1 L of crystalloid solution mainly normal saline, followed by 75 mL an hour. But as her sodium increased to 123 the normal saline was stopped. Unfortunately this morning sodium is still 126 mEq/L. PMH :   Atrial fibrillation on Rythmol and direct oral anticoagulation therapy  Hypothyroidism        PSH :  Only significant for  and hysterectomy      OB GYN Hx:   9 para 7 2 miscarriages, no history of eclampsia, preeclampsia, gestational diabetes or hypertension    Habits :   No history of smoking, seizures , no history of illicit drug abuse    Soc Hx:  Patient was born and raised in [facility]. 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 She has been  for 60 years and has 6 children. She resides here in town with her     [address]   Mom  of some kind of malignancy in mid 76s, sister has some kind of medical condition but nobody has low sodium history in the family        REVIEW OF SYSTEMS:     All pertinent ROS neg except   Nausea, vomiting, diarrhea, runny stuffy nose, cough, fatigue and tiredness and decreased oral intake    Medication :     Reviewed, see in epic    BP 83/64   Pulse 76   Temp 99 °F (37.2 °C) (Oral)   Resp 17   Ht 5' 4.5\" (1.638 m)   Wt 135 lb (61.2 kg)   SpO2 93%   BMI 22.81 kg/m²     PHYSICAL EXAM:  General appearance: Alert, awake and oriented  HEENT: No gross conjunctival pallor  Neck: Supple  Heart: Irregularly irregular  LUNGS: Positive adventitious breath sound  Abdomen: Soft, nontender  Extremities: No gross edema    LABS:  Reviewed, see in epic            IMPRESSION:  Hyponatremia-likely from volume depletion-i.e. hypovolemic hyponatremia-her urinary indicis were drawn after normal saline bolus-I suspect due to fluid bolus, her ADH acutely suppressed, causing increased free water clearance, and increased sodium. Also she does have acute bladder obstruction with bilateral hydro, with replied some role is quite possible  Acute bladder obstruction and bilateral hydronephrosis-insert Nixon catheter for now  COVID-19 with likely mild pneumonitis-also she is not requiring supplemental oxygen, underlying A. fib and thyroid disease  Leukopenia and thrombocytopenia likely related to COVID-19-but we might have to look for other etiology    PLAN:    Nixon catheter insertion  D5 water at 3 mL/kg/h-if the next sodium still more than 122, then at 2 mcg IV DDAVP, goal is to restrict sodium rise up to 8 mEq/L per 24 hours, to reduce the risk of osmotic demyelination syndrome, watch for organ involvement from COVID-19, follow clinically, BMP every 6-8 hours.   Watch for iatrogenic nosocomial complication

## 2022-10-07 NOTE — PROGRESS NOTES
This RN called lab and spoke to [facility]. NA redrawn at 3am still not resulted. NA should be posted soon.

## 2022-10-07 NOTE — PROGRESS NOTES
Notified lab about unresolved sodium lab that was sent at [facility]. Per Rush Coreas in lab tube was never received. New sodium sent at 0303.

## 2022-10-07 NOTE — ED NOTES
Report given to Aliya Munguia RN at this time, all questions answered.       Johana Miranda RN  10/06/22 3294

## 2022-10-07 NOTE — PROGRESS NOTES
In-Patient Progress Note    Patient: Keenan Louis 78 y.o. female MRN: [id number]     Date of Service: 10/7/2022    Hospital Day: 2      Chief complaint: had concerns including Emesis, Nausea, and Fatigue. Subjective   Patient seen and examined in the afternoon. Patient states she feels a little better. Still weak and fatigued. No longer nauseous or vomiting. No longer has diarrhea. No difficulty breathing. Son and  at bedside - all questions answered. See ROS    I have reviewed all pertinent PMHx, PSHx, FamHx, SocialHx, medications, and allergies and updated history as appropriate. I have reviewed images as appropriate. Assessment and Plan   Keenan Louis, a 78 y.o. female, with a history of A. Fib on apixaban, urinary retention, OA, osteoporosis, hypothyroidism, GERD DDD was admitted on 10/6/2022 with complaints of had concerns including Emesis, Nausea, and Fatigue. Assessment  Severe Hyponatremia 2/2 likely hypovolemic 2/2 #2  Initially presented with Na 114  Rapidly corrected to 126 (after fluid bolus) and now down to 119 after D5  Nephro on board   BMP Q4H    Vomiting and Diarrhea 2/2 #3 vs medication induced - Improving  Started Sunday  PCP recommended increasing fluid intake but patient unable to keep it down  On propafenone at home - currently on hold    COVID 19, unvaccinated   Tested positive 10/5/2022  Unvaccinated   Was given script for paxlovid, steroids and lomotil by PCP but she felt worse so came in   Family refused remdesivir  Not on O2  CRP -ve  Febrile with T-max 100    Leukopenia (Neutropenia) and Thrombocytopenia 2/2 likely #3  Initial WBC 1.0 improving to 2.6  Platelet 746    H/O Permanent A. Fib on apixaban  On propafenone at home    Acute urinary retention in the setting of H/O urinary retention   CT abdomen with moderate Rt. And mild Lt.  Hydronephrosis due to bladder distension Urothelial thickening +, UA is negative  Nixon placed    Hyperglycemia 2/2 likely stress induced  On MDSS   A1c 4.9% (10/6/2022)    H/O OA, Osteoporosis, DDD    H/O Hypothyroidism  On synthroid 137mcg OD at home   Most recent TSH and FT4 0.16 and 2.0 - 9/6/2022    H/O GERD    Diverticulosis without diverticulitis       Plan  F/U nephro recs   Hyponatremia management per nephro   Continue home meds   Change synthroid to 137mcg - looks like it was changed 9/6/2022 from 150mcg as outpatient. Send TSH and FT4  Defer to nephro for diet. Ok to defer remdesivir - not requiring O2 and CRP low. Continue escamilla for today - plan to remove tomorrow vs day after   Keep in ICU today - plan to transfer to step down tomorrow - discussed with Dr. Shawnee Yanez ([facility])  Seizure precaution       # Peptic ulcer prophylaxis: Famotidine  # DVT Prophylaxis: Apixaban  #CODE STATUS: Full      Current living situation: Home  Expected Disposition: Home  Estimated discharge date: 3-4 days. Slow correction of hyponatremia       Review of System     Review of Systems   Constitutional:  Positive for fatigue. Negative for chills, fever and unexpected weight change. Eyes:  Negative for pain and visual disturbance. Respiratory:  Negative for cough, choking, chest tightness, shortness of breath, wheezing and stridor. Cardiovascular:  Negative for chest pain, palpitations and leg swelling. Gastrointestinal:  Negative for abdominal distention, abdominal pain, blood in stool, constipation, diarrhea, nausea and vomiting. Genitourinary:  Negative for decreased urine volume, dysuria, frequency, hematuria and urgency. Musculoskeletal:  Negative for arthralgias, back pain and myalgias. Skin:  Negative for pallor and rash. Neurological:  Positive for weakness. Negative for dizziness, tremors, syncope, speech difficulty, light-headedness, numbness and headaches. Psychiatric/Behavioral:  Negative for agitation.       I have reviewed all pertinent PMHx, PSHx, FamHx, SocialHx, medications, and allergies and updated history as appropriate. Physical Exam   VITAL SIGNS:  BP (!) 107/57   Pulse 64   Temp 100 °F (37.8 °C) (Bladder)   Resp 18   Ht 5' 4.5\" (1.638 m)   Wt 135 lb (61.2 kg)   SpO2 96%   BMI 22.81 kg/m²   Tmax over 24 hours:  Temp (24hrs), Av.1 °F (37.3 °C), Min:98.2 °F (36.8 °C), Max:100 °F (37.8 °C)      Patient Vitals for the past 6 hrs:   BP Temp Temp src Pulse Resp SpO2   10/07/22 1400 (!) 107/57 -- -- 64 18 96 %   10/07/22 1300 (!) 86/56 -- -- 74 18 --   10/07/22 1200 (!) 92/57 100 °F (37.8 °C) Bladder 62 24 93 %   10/07/22 1100 96/60 -- -- 65 20 97 %   10/07/22 1000 (!) 101/58 -- -- 65 17 96 %         Intake/Output Summary (Last 24 hours) at 10/7/2022 1508  Last data filed at 10/7/2022 1325  Gross per 24 hour   Intake 634.78 ml   Output 1300 ml   Net -665.22 ml     Wt Readings from Last 2 Encounters:   10/06/22 135 lb (61.2 kg)   22 138 lb (62.6 kg)     Body mass index is 22.81 kg/m². Physical Exam  Vitals and nursing note reviewed. Constitutional:       General: She is not in acute distress. Appearance: She is not ill-appearing, toxic-appearing or diaphoretic. HENT:      Head: Normocephalic and atraumatic. Right Ear: External ear normal.      Left Ear: External ear normal.      Nose: Nose normal.   Eyes:      General: No scleral icterus. Right eye: No discharge. Left eye: No discharge. Cardiovascular:      Rate and Rhythm: Normal rate. Rhythm irregularly irregular. Pulses: Normal pulses. Heart sounds: Normal heart sounds. No murmur heard. No friction rub. No gallop. Pulmonary:      Effort: Pulmonary effort is normal. No respiratory distress. Breath sounds: Normal breath sounds. No stridor. No wheezing, rhonchi or rales. Abdominal:      General: Bowel sounds are normal. There is no distension. Palpations: Abdomen is soft. There is no mass. Tenderness: There is no abdominal tenderness. There is no guarding or rebound.       Hernia: No hernia is present. Musculoskeletal:      Right lower leg: No edema. Left lower leg: No edema. Skin:     General: Skin is warm. Capillary Refill: Capillary refill takes less than 2 seconds. Neurological:      Mental Status: She is alert and oriented to person, place, and time. Psychiatric:         Mood and Affect: Mood normal.         Current Medications      levothyroxine  150 mcg Oral Daily    propafenone  225 mg Oral BID    famotidine  20 mg Oral BID    insulin lispro  0-8 Units SubCUTAneous TID WC    apixaban  5 mg Oral BID         Labs and Imaging Studies   Laboratory findings:  CT ABDOMEN PELVIS W IV CONTRAST Additional Contrast? None    Result Date: 10/6/2022  EXAMINATION: CT OF THE ABDOMEN AND PELVIS WITH CONTRAST 10/6/2022 5:03 pm TECHNIQUE: CT of the abdomen and pelvis was performed with the administration of intravenous contrast. Multiplanar reformatted images are provided for review. Automated exposure control, iterative reconstruction, and/or weight based adjustment of the mA/kV was utilized to reduce the radiation dose to as low as reasonably achievable. COMPARISON: None available. HISTORY: ORDERING SYSTEM PROVIDED HISTORY: abd pain TECHNOLOGIST PROVIDED HISTORY: Reason for exam:->abd pain Additional Contrast?->None Decision Support Exception - unselect if not a suspected or confirmed emergency medical condition->Emergency Medical Condition (MA) Reason for Exam: abd pain FINDINGS: Lower Chest: Dependent atelectasis noted bilaterally. Visualized lungs otherwise clear. Base of the heart unremarkable. Organs: Liver enhances normally. Gallbladder unremarkable. Portal vein patent. Spleen unremarkable. Adrenals unremarkable. Pancreas unremarkable. There is moderate right and mild left hydronephrosis, likely secondary to urinary bladder distention. Urothelial thickening is seen bilaterally, greater on the right. GI/Bowel: Small hiatal hernia. Stomach otherwise unremarkable.   No small bowel abnormalities are identified. Moderate diverticulosis of the large bowel is seen, without convincing CT evidence of diverticulitis. Appendix is elongated, but the tip is gas-filled, and no asymmetric periappendiceal fat stranding is identified. Pelvis: Small to moderate amount of free pelvic fluid is identified. There is moderate to marked urinary bladder distention, with urinary bladder measuring approximately 14 cm in cranial caudal dimension. Pelvic floor laxity is noted uterus is surgically absent. Peritoneum/Retroperitoneum: Abdominal aorta normal in caliber. Superior mesenteric artery is enhancing. No lymphadenopathy. Bones/Soft Tissues: No acute or suspicious bony abnormality identified. The extra-abdominal and extra pelvic soft tissues are unremarkable. Moderate right and mild left hydronephrosis, which is likely secondary to back pressure from urinary bladder distension. There is urothelial thickening, and therefore air tract infection/inflammation should be considered. Small to moderate amount of free pelvic fluid, which is presumably reactive. Moderate diverticulosis of the large bowel, but without convincing CT evidence of diverticulitis. Small hiatal hernia. XR CHEST PORTABLE    Result Date: 10/6/2022  EXAMINATION: ONE XRAY VIEW OF THE CHEST 10/6/2022 5:38 pm COMPARISON: 11/01/2020. HISTORY: ORDERING SYSTEM PROVIDED HISTORY: cough TECHNOLOGIST PROVIDED HISTORY: Reason for exam:->cough Reason for Exam: cough FINDINGS: The heart is enlarged. Vascular calcifications are noted along the aortic arch. There is a dextroscoliosis in the mid-lower thoracic spine. There is pulmonary vascular congestion. There is blunting of the right lateral costophrenic angle, stable, likely related to pleural scar. Atelectasis is noted in the lung bases. The lung apices are clear. The bones are osteopenic. 1. Mild pulmonary vascular congestion.        Recent Results (from the past 24 hour(s))   EKG 12 Lead Collection Time: 10/06/22  4:45 PM   Result Value Ref Range    Ventricular Rate 58 BPM    Atrial Rate 58 BPM    P-R Interval 178 ms    QRS Duration 94 ms    Q-T Interval 468 ms    QTc Calculation (Bazett) 459 ms    P Axis 96 degrees    R Axis 40 degrees    T Axis 67 degrees    Diagnosis       Sinus bradycardia  Otherwise normal ECG  When compared with ECG of 25-DEC-2018 06:13,  No significant change was found     Brain Natriuretic Peptide    Collection Time: 10/06/22  5:20 PM   Result Value Ref Range    Pro-.9 (H) <300 PG/ML   CBC with Auto Differential    Collection Time: 10/06/22  5:29 PM   Result Value Ref Range    WBC 1.0 (LL) 4.0 - 10.5 K/CU MM    RBC 4.31 4.2 - 5.4 M/CU MM    Hemoglobin 13.3 12.5 - 16.0 GM/DL    Hematocrit 37.2 37 - 47 %    MCV 86.3 78 - 100 FL    MCH 30.9 27 - 31 PG    MCHC 35.8 32.0 - 36.0 %    RDW 11.4 (L) 11.7 - 14.9 %    Platelets 369 (L) 973 - 440 K/CU MM    MPV 9.4 7.5 - 11.1 FL    Cells Counted 50     Metamyelocytes Relative 2 (H) 0.0 %    Bands Relative 10 5 - 11 %    Segs Relative 38.0 36 - 66 %    Basophils % 2.0 (H) 0 - 1 %    Lymphocytes % 38.0 24 - 44 %    Monocytes % 10.0 (H) 0 - 4 %    Metamyelocytes Absolute 0.02 K/CU MM    Bands Absolute 0.10 K/CU MM    Segs Absolute 0.4 K/CU MM    Basophils Absolute 0.0 K/CU MM    Lymphocytes Absolute 0.4 K/CU MM    Monocytes Absolute 0.1 K/CU MM    Differential Type MANUAL DIFFERENTIAL     PLT Morphology LARGE    Comprehensive Metabolic Panel    Collection Time: 10/06/22  5:29 PM   Result Value Ref Range    Sodium 114 (LL) 135 - 145 MMOL/L    Potassium 4.0 3.5 - 5.1 MMOL/L    Chloride 83 (L) 99 - 110 mMol/L    CO2 23 21 - 32 MMOL/L    BUN 9 6 - 23 MG/DL    Creatinine 0.5 (L) 0.6 - 1.1 MG/DL    Glucose 115 (H) 70 - 99 MG/DL    Calcium 7.8 (L) 8.3 - 10.6 MG/DL    Albumin 3.7 3.4 - 5.0 GM/DL    Total Protein 6.4 6.4 - 8.2 GM/DL    Total Bilirubin 0.4 0.0 - 1.0 MG/DL    ALT 20 10 - 40 U/L    AST 29 15 - 37 IU/L    Alkaline Phosphatase 71 40 - 129 IU/L    GFR Non-African American >60 >60 mL/min/1.73m2    GFR African American >60 >60 mL/min/1.73m2    Anion Gap 8 4 - 16   Troponin    Collection Time: 10/06/22  5:29 PM   Result Value Ref Range    Troponin T <0.010 <0.01 NG/ML   Lipase    Collection Time: 10/06/22  5:29 PM   Result Value Ref Range    Lipase 122 (H) 13 - 60 IU/L   Lactic Acid    Collection Time: 10/06/22  5:29 PM   Result Value Ref Range    Lactate 0.8 0.4 - 2.0 mMOL/L   Hemoglobin A1c    Collection Time: 10/06/22  5:29 PM   Result Value Ref Range    Hemoglobin A1C 4.9 4.2 - 6.3 %    eAG 94 mg/dL   Urinalysis    Collection Time: 10/06/22  6:46 PM   Result Value Ref Range    Color, UA YELLOW     Clarity, UA CLEAR     Glucose, Urine NEGATIVE MG/DL    Bilirubin Urine NEGATIVE     Ketones, Urine 40 MG/DL    Specific Gravity, UA 1.015     Blood, Urine NEGATIVE     pH, Urine 6.0     Protein, UA NEGATIVE MG/DL    Urobilinogen, Urine 0.2 MG/DL    Nitrite Urine, Quantitative NEGATIVE     Leukocyte Esterase, Urine TRACE    Microscopic Urinalysis    Collection Time: 10/06/22  6:46 PM   Result Value Ref Range    RBC, UA <1 /HPF    WBC, UA 6 /HPF    Bacteria, UA NEGATIVE /HPF    Squam Epithel, UA 1 /HPF    Trichomonas, UA NONE SEEN /HPF   Osmolality, Urine    Collection Time: 10/06/22  6:46 PM   Result Value Ref Range    Osmolality, Ur 378 292 - 1090 MOS/L   Sodium, urine, random    Collection Time: 10/06/22  6:46 PM   Result Value Ref Range    Sodium, Ur 73 35 - 167 MMOL/L   COVID-19, Rapid    Collection Time: 10/06/22  8:05 PM    Specimen: Nasopharyngeal   Result Value Ref Range    Source UNKNOWN     SARS-CoV-2, NAAT DETECTED (A) NOT DETECTED   Osmolality    Collection Time: 10/06/22  8:09 PM   Result Value Ref Range    Osmolality 258 (L) 280 - 300 MOS/L   C-Reactive Protein    Collection Time: 10/06/22 10:16 PM   Result Value Ref Range    CRP, High Sensitivity 0.4 0.0 - 5.0 mg/L   Sedimentation Rate    Collection Time: 10/06/22 10:16 PM   Result Value Ref Range    Sed Rate 1 0 - 30 MM/HR   Sodium    Collection Time: 10/06/22 10:16 PM   Result Value Ref Range    Sodium 123 (L) 135 - 145 MMOL/L   POCT Glucose    Collection Time: 10/06/22 11:22 PM   Result Value Ref Range    POC Glucose 96 70 - 99 MG/DL   Sodium    Collection Time: 10/07/22  3:00 AM   Result Value Ref Range    Sodium 126 (L) 135 - 145 MMOL/L   Basic Metabolic Panel w/ Reflex to MG    Collection Time: 10/07/22  4:30 AM   Result Value Ref Range    Sodium 126 (L) 135 - 145 MMOL/L    Potassium 3.6 3.5 - 5.1 MMOL/L    Chloride 92 (L) 99 - 110 mMol/L    CO2 24 21 - 32 MMOL/L    Anion Gap 10 4 - 16    BUN 6 6 - 23 MG/DL    Creatinine 0.6 0.6 - 1.1 MG/DL    Glucose 88 70 - 99 MG/DL    Calcium 8.1 (L) 8.3 - 10.6 MG/DL    GFR Non-African American >60 >60 mL/min/1.73m2    GFR African American >60 >60 mL/min/1.73m2   CBC with Auto Differential    Collection Time: 10/07/22  4:30 AM   Result Value Ref Range    WBC 2.6 (L) 4.0 - 10.5 K/CU MM    RBC 4.36 4.2 - 5.4 M/CU MM    Hemoglobin 13.3 12.5 - 16.0 GM/DL    Hematocrit 37.6 37 - 47 %    MCV 86.2 78 - 100 FL    MCH 30.5 27 - 31 PG    MCHC 35.4 32.0 - 36.0 %    RDW 11.4 (L) 11.7 - 14.9 %    Platelets 250 (L) 933 - 440 K/CU MM    MPV 9.1 7.5 - 11.1 FL    Differential Type AUTOMATED DIFFERENTIAL     Segs Relative 56.8 36 - 66 %    Lymphocytes % 29.4 24 - 44 %    Monocytes % 13.0 (H) 0 - 4 %    Eosinophils % 0.0 0 - 3 %    Basophils % 0.4 0 - 1 %    Segs Absolute 1.5 K/CU MM    Lymphocytes Absolute 0.8 K/CU MM    Monocytes Absolute 0.3 K/CU MM    Eosinophils Absolute 0.0 K/CU MM    Basophils Absolute 0.0 K/CU MM    Nucleated RBC % 0.0 %    Total Nucleated RBC 0.0 K/CU MM    Total Immature Neutrophil 0.01 K/CU MM    Immature Neutrophil % 0.4 0 - 0.43 %   Vitamin D 25 Hydroxy    Collection Time: 10/07/22  4:30 AM   Result Value Ref Range    Vit D, 25-Hydroxy 43.06 >20 NG/ML   Hepatic Function Panel    Collection Time: 10/07/22  4:30 AM   Result Value Ref Range Albumin 3.4 3.4 - 5.0 GM/DL    Total Bilirubin 0.3 0.0 - 1.0 MG/DL    Bilirubin, Direct 0.2 0.0 - 0.3 MG/DL    Bilirubin, Indirect 0.1 0 - 0.7 MG/DL    Alkaline Phosphatase 70 40 - 129 IU/L    AST 25 15 - 37 IU/L    ALT 19 10 - 40 U/L    Total Protein 5.6 (L) 6.4 - 8.2 GM/DL   Sodium    Collection Time: 10/07/22  7:10 AM   Result Value Ref Range    Sodium 124 (L) 135 - 145 MMOL/L   Basic Metabolic Panel    Collection Time: 10/07/22  9:30 AM   Result Value Ref Range    Sodium 119 (LL) 135 - 145 MMOL/L    Potassium 4.1 3.5 - 5.1 MMOL/L    Chloride 87 (L) 99 - 110 mMol/L    CO2 26 21 - 32 MMOL/L    Anion Gap 6 4 - 16    BUN 7 6 - 23 MG/DL    Creatinine 0.7 0.6 - 1.1 MG/DL    Glucose 136 (H) 70 - 99 MG/DL    Calcium 8.1 (L) 8.3 - 10.6 MG/DL    GFR Non-African American >60 >60 mL/min/1.73m2    GFR African American >60 >60 mL/min/1.73m2   POCT Glucose    Collection Time: 10/07/22  9:42 AM   Result Value Ref Range    POC Glucose 125 (H) 70 - 99 MG/DL   POCT Glucose    Collection Time: 10/07/22  1:17 PM   Result Value Ref Range    POC Glucose 150 (H) 70 - 99 MG/DL   Basic Metabolic Panel    Collection Time: 10/07/22  1:50 PM   Result Value Ref Range    Sodium 119 (LL) 135 - 145 MMOL/L    Potassium 3.5 3.5 - 5.1 MMOL/L    Chloride 86 (L) 99 - 110 mMol/L    CO2 24 21 - 32 MMOL/L    Anion Gap 9 4 - 16    BUN 7 6 - 23 MG/DL    Creatinine 0.7 0.6 - 1.1 MG/DL    Glucose 139 (H) 70 - 99 MG/DL    Calcium 7.7 (L) 8.3 - 10.6 MG/DL    GFR Non-African American >60 >60 mL/min/1.73m2    GFR African American >60 >60 mL/min/1.73m2           Electronically signed by Angie Hsu MD on 10/7/2022 at 3:08 PM

## 2022-10-08 LAB
ALBUMIN SERPL-MCNC: 2.9 GM/DL (ref 3.4–5)
ALP BLD-CCNC: 67 IU/L (ref 40–129)
ALT SERPL-CCNC: 17 U/L (ref 10–40)
ANION GAP SERPL CALCULATED.3IONS-SCNC: 5 MMOL/L (ref 4–16)
ANION GAP SERPL CALCULATED.3IONS-SCNC: 6 MMOL/L (ref 4–16)
ANION GAP SERPL CALCULATED.3IONS-SCNC: 7 MMOL/L (ref 4–16)
ANION GAP SERPL CALCULATED.3IONS-SCNC: 7 MMOL/L (ref 4–16)
ANION GAP SERPL CALCULATED.3IONS-SCNC: 8 MMOL/L (ref 4–16)
AST SERPL-CCNC: 25 IU/L (ref 15–37)
BASOPHILS ABSOLUTE: 0 K/CU MM
BASOPHILS RELATIVE PERCENT: 0 % (ref 0–1)
BILIRUB SERPL-MCNC: 0.3 MG/DL (ref 0–1)
BILIRUBIN DIRECT: 0.2 MG/DL (ref 0–0.3)
BILIRUBIN, INDIRECT: 0.1 MG/DL (ref 0–0.7)
BUN BLDV-MCNC: 6 MG/DL (ref 6–23)
BUN BLDV-MCNC: 7 MG/DL (ref 6–23)
BUN BLDV-MCNC: 7 MG/DL (ref 6–23)
CALCIUM SERPL-MCNC: 7.4 MG/DL (ref 8.3–10.6)
CALCIUM SERPL-MCNC: 7.5 MG/DL (ref 8.3–10.6)
CALCIUM SERPL-MCNC: 7.6 MG/DL (ref 8.3–10.6)
CHLORIDE BLD-SCNC: 87 MMOL/L (ref 99–110)
CHLORIDE BLD-SCNC: 88 MMOL/L (ref 99–110)
CHLORIDE BLD-SCNC: 89 MMOL/L (ref 99–110)
CO2: 24 MMOL/L (ref 21–32)
CO2: 25 MMOL/L (ref 21–32)
CO2: 26 MMOL/L (ref 21–32)
CREAT SERPL-MCNC: 0.6 MG/DL (ref 0.6–1.1)
CREAT SERPL-MCNC: 0.6 MG/DL (ref 0.6–1.1)
CREAT SERPL-MCNC: 0.7 MG/DL (ref 0.6–1.1)
CREAT SERPL-MCNC: 0.7 MG/DL (ref 0.6–1.1)
CREAT SERPL-MCNC: 0.8 MG/DL (ref 0.6–1.1)
DIFFERENTIAL TYPE: ABNORMAL
EOSINOPHILS ABSOLUTE: 0 K/CU MM
EOSINOPHILS RELATIVE PERCENT: 0 % (ref 0–3)
GFR AFRICAN AMERICAN: >60 ML/MIN/1.73M2
GFR NON-AFRICAN AMERICAN: >60 ML/MIN/1.73M2
GLUCOSE BLD-MCNC: 104 MG/DL (ref 70–99)
GLUCOSE BLD-MCNC: 121 MG/DL (ref 70–99)
GLUCOSE BLD-MCNC: 132 MG/DL (ref 70–99)
GLUCOSE BLD-MCNC: 90 MG/DL (ref 70–99)
GLUCOSE BLD-MCNC: 91 MG/DL (ref 70–99)
GLUCOSE BLD-MCNC: 91 MG/DL (ref 70–99)
HCT VFR BLD CALC: 36.1 % (ref 37–47)
HEMOGLOBIN: 12.5 GM/DL (ref 12.5–16)
IMMATURE NEUTROPHIL %: 0.4 % (ref 0–0.43)
LYMPHOCYTES ABSOLUTE: 1.1 K/CU MM
LYMPHOCYTES RELATIVE PERCENT: 43.1 % (ref 24–44)
MAGNESIUM: 1.5 MG/DL (ref 1.8–2.4)
MCH RBC QN AUTO: 30 PG (ref 27–31)
MCHC RBC AUTO-ENTMCNC: 34.6 % (ref 32–36)
MCV RBC AUTO: 86.8 FL (ref 78–100)
MONOCYTES ABSOLUTE: 0.3 K/CU MM
MONOCYTES RELATIVE PERCENT: 13.4 % (ref 0–4)
NUCLEATED RBC %: 0 %
PDW BLD-RTO: 11.7 % (ref 11.7–14.9)
PHOSPHORUS: 2.9 MG/DL (ref 2.5–4.9)
PLATELET # BLD: 105 K/CU MM (ref 140–440)
PMV BLD AUTO: 10.1 FL (ref 7.5–11.1)
POTASSIUM SERPL-SCNC: 3.7 MMOL/L (ref 3.5–5.1)
POTASSIUM SERPL-SCNC: 3.8 MMOL/L (ref 3.5–5.1)
POTASSIUM SERPL-SCNC: 3.9 MMOL/L (ref 3.5–5.1)
POTASSIUM SERPL-SCNC: 4.1 MMOL/L (ref 3.5–5.1)
POTASSIUM SERPL-SCNC: 4.2 MMOL/L (ref 3.5–5.1)
PROCALCITONIN: 0.04
RBC # BLD: 4.16 M/CU MM (ref 4.2–5.4)
SEGMENTED NEUTROPHILS ABSOLUTE COUNT: 1.1 K/CU MM
SEGMENTED NEUTROPHILS RELATIVE PERCENT: 43.1 % (ref 36–66)
SODIUM BLD-SCNC: 119 MMOL/L (ref 135–145)
SODIUM BLD-SCNC: 120 MMOL/L (ref 135–145)
SODIUM BLD-SCNC: 121 MMOL/L (ref 135–145)
T4 FREE: 1.57 NG/DL (ref 0.9–1.8)
TOTAL IMMATURE NEUTOROPHIL: 0.01 K/CU MM
TOTAL NUCLEATED RBC: 0 K/CU MM
TOTAL PROTEIN: 4.9 GM/DL (ref 6.4–8.2)
TSH HIGH SENSITIVITY: 2.14 UIU/ML (ref 0.27–4.2)
WBC # BLD: 2.5 K/CU MM (ref 4–10.5)

## 2022-10-08 PROCEDURE — 6370000000 HC RX 637 (ALT 250 FOR IP): Performed by: NURSE PRACTITIONER

## 2022-10-08 PROCEDURE — 6370000000 HC RX 637 (ALT 250 FOR IP): Performed by: STUDENT IN AN ORGANIZED HEALTH CARE EDUCATION/TRAINING PROGRAM

## 2022-10-08 PROCEDURE — 2580000003 HC RX 258: Performed by: INTERNAL MEDICINE

## 2022-10-08 PROCEDURE — 36415 COLL VENOUS BLD VENIPUNCTURE: CPT

## 2022-10-08 PROCEDURE — 97535 SELF CARE MNGMENT TRAINING: CPT

## 2022-10-08 PROCEDURE — 80048 BASIC METABOLIC PNL TOTAL CA: CPT

## 2022-10-08 PROCEDURE — 85025 COMPLETE CBC W/AUTO DIFF WBC: CPT

## 2022-10-08 PROCEDURE — 83735 ASSAY OF MAGNESIUM: CPT

## 2022-10-08 PROCEDURE — 6360000002 HC RX W HCPCS: Performed by: NURSE PRACTITIONER

## 2022-10-08 PROCEDURE — 80053 COMPREHEN METABOLIC PANEL: CPT

## 2022-10-08 PROCEDURE — 2100000000 HC CCU R&B

## 2022-10-08 PROCEDURE — 51702 INSERT TEMP BLADDER CATH: CPT

## 2022-10-08 PROCEDURE — 84100 ASSAY OF PHOSPHORUS: CPT

## 2022-10-08 PROCEDURE — 87040 BLOOD CULTURE FOR BACTERIA: CPT

## 2022-10-08 PROCEDURE — 82248 BILIRUBIN DIRECT: CPT

## 2022-10-08 PROCEDURE — 97166 OT EVAL MOD COMPLEX 45 MIN: CPT

## 2022-10-08 PROCEDURE — 94761 N-INVAS EAR/PLS OXIMETRY MLT: CPT

## 2022-10-08 PROCEDURE — 84145 PROCALCITONIN (PCT): CPT

## 2022-10-08 PROCEDURE — 82962 GLUCOSE BLOOD TEST: CPT

## 2022-10-08 PROCEDURE — 6360000002 HC RX W HCPCS: Performed by: INTERNAL MEDICINE

## 2022-10-08 PROCEDURE — 6370000000 HC RX 637 (ALT 250 FOR IP): Performed by: INTERNAL MEDICINE

## 2022-10-08 RX ORDER — MAGNESIUM SULFATE 4 G/50ML
4000 INJECTION INTRAVENOUS ONCE
Status: COMPLETED | OUTPATIENT
Start: 2022-10-08 | End: 2022-10-08

## 2022-10-08 RX ADMIN — LEVOTHYROXINE SODIUM 137 MCG: 25 TABLET ORAL at 06:53

## 2022-10-08 RX ADMIN — CEFTRIAXONE SODIUM 1000 MG: 1 INJECTION, POWDER, FOR SOLUTION INTRAMUSCULAR; INTRAVENOUS at 16:25

## 2022-10-08 RX ADMIN — APIXABAN 5 MG: 5 TABLET, FILM COATED ORAL at 09:01

## 2022-10-08 RX ADMIN — FAMOTIDINE 20 MG: 20 TABLET ORAL at 20:03

## 2022-10-08 RX ADMIN — FAMOTIDINE 20 MG: 20 TABLET ORAL at 09:01

## 2022-10-08 RX ADMIN — SODIUM CHLORIDE: 9 INJECTION, SOLUTION INTRAVENOUS at 14:33

## 2022-10-08 RX ADMIN — ONDANSETRON 4 MG: 2 INJECTION INTRAMUSCULAR; INTRAVENOUS at 12:07

## 2022-10-08 RX ADMIN — MAGNESIUM SULFATE HEPTAHYDRATE 4000 MG: 4 INJECTION, SOLUTION INTRAVENOUS at 10:35

## 2022-10-08 RX ADMIN — PROPAFENONE HYDROCHLORIDE 225 MG: 225 CAPSULE, EXTENDED RELEASE ORAL at 06:56

## 2022-10-08 RX ADMIN — PROPAFENONE HYDROCHLORIDE 225 MG: 225 CAPSULE, EXTENDED RELEASE ORAL at 20:04

## 2022-10-08 RX ADMIN — APIXABAN 5 MG: 5 TABLET, FILM COATED ORAL at 20:04

## 2022-10-08 ASSESSMENT — ENCOUNTER SYMPTOMS
EYE PAIN: 0
ABDOMINAL DISTENTION: 0
SHORTNESS OF BREATH: 0
CONSTIPATION: 0
NAUSEA: 0
BLOOD IN STOOL: 0
BACK PAIN: 0
COUGH: 0
STRIDOR: 0
CHOKING: 0
CHEST TIGHTNESS: 0
WHEEZING: 0
VOMITING: 0
DIARRHEA: 0
ABDOMINAL PAIN: 0

## 2022-10-08 NOTE — PROGRESS NOTES
Occupational Therapy    [facility] CARE OCCUPATIONAL THERAPY EVALUATION  Alicia Brooks, 1940, -A, 10/8/2022    History  [place]:  The primary encounter diagnosis was Hyponatremia. Diagnoses of Elevated lipase, COVID, Hydronephrosis, unspecified hydronephrosis type, and Nausea vomiting and diarrhea were also pertinent to this visit. Patient  has a past medical history of AF (atrial fibrillation) ([address] Allergic rhinitis due to other allergen, Anxiety, Arthritis, Arthritis of big toe, B12 deficiency, BCC (basal cell carcinoma), face, Carpal tunnel syndrome, Congenital prolapse of bladder mucosa, COVID-19, DDD (degenerative disc disease), lumbar, Female incontinence, H/O cardiovascular stress test, H/O echocardiogram, H/O echocardiogram, Hiatal hernia, History of Doppler ultrasound, Homocysteinemia, HX OTHER MEDICAL, Hyperlipidemia, Hyponatremia, Hypothyroid, Lumbar herniated disc, Menopause, MTHFR mutation (methylenetetrahydrofolate reductase), Osteoarthritis of foot, right, Osteoporosis, Pain in shoulder, SBE (subacute bacterial endocarditis) prophylaxis candidate, VHD (valvular heart disease), and Vitamin D deficiency. Patient  has a past surgical history that includes  section (1965); Tonsillectomy (1943); Hysterectomy (10/21/2009); Rectocele repair (2011); Cataract removal with implant (Bilateral, 2012); bladder suspension (2009); Finger trigger release (Right); Skin cancer excision (Left, 2013); and Wrist fracture surgery (Left, 2020). Subjective:  Patient states:  \"I want to get up to the chair but I'm tired\".     Pain:  No.    Communication with other providers:  Handoff to RN  Restrictions: Droplet Plus, General Precautions, Fall Risk    Home Setup/Prior level of function  Social/Functional History  Lives With: Spouse  Type of Home: House  Home Layout: One level  Home Access: Stairs to enter with rails  Entrance Stairs - Number of Steps: 3  Bathroom Shower/Tub: Tub/Shower unit  Bathroom Toilet: Standard  Bathroom Equipment: Shower chair  Has the patient had two or more falls in the past year or any fall with injury in the past year?: No  ADL Assistance: [address]: Independent  Homemaking Responsibilities: Yes  Ambulation Assistance: Independent  Transfer Assistance: Independent  Active : Yes  Mode of Transportation: Car    Examination of body systems (includes body structures/functions, activity/participation limitations):  Observation:  Supine in bed upon arrival, agreeable to therapy, family in room  Vision:  Readers  Hearing:  [employer] [facility]  Cardiopulmonary:  No 02 needs      Body Systems and functions:  ROM R/L:  WFL. Strength R/L:  4/5,   Sensation: Numbness in bilateral fingers  Tone: Normal  Coordination: WFL  Perception: WNL    Activities of Daily Living (ADLs):  Feeding: Son  Grooming: CGA (washing hands/face w/ warm washcloth)  UB bathing: SBA  LB bathing: maxA (washing zena area/buttocks in stand after toileting)  UB dressing: SBA  LB dressing: maxA donning socks sitting EOB  Toileting: maxA (pt toileted on C this session, see LB bathing/dressing for details)    Cognitive and Psychosocial Functioning:  Overall cognitive status: WFL  Affect: Fatigued       Mobility:  Supine to sit:  modA  Transfers: Liz from EOB up to RW  Sitting balance:  SBA. Standing balance:  Liz w/ RW.   Functional Mobility Liz w/ RW ~10 feet before fatiguing  Toilet/Shower Transfers: Liz to/from [facility]             AM-[facility] Daily Activity Inpatient   How much help for putting on and taking off regular lower body clothing?: Total  How much help for Bathing?: A Lot  How much help for Toileting?: Total  How much help for putting on and taking off regular upper body clothing?: A Little  How much help for taking care of personal grooming?: A Little  How much help for eating meals?: None  AM-[facility] Inpatient Daily Activity Raw Score: 14  AM-[facility] Inpatient ADL T-Scale Score : 33.39  ADL Inpatient CMS 0-100% Score: 59.67  ADL Inpatient CMS G-Code Modifier : CK    Treatment:  Self Care Training:   Cues were given for safety, sequence, UE/LE placement, visual cues, and balance. Grooming, LB bathing/dressing, toileting, toilet transfer     Safety: patient left in chair with chair alarm, call light within reach, RN notified, gait belt used. Assessment:  Pt is a 79 yo female admitted from home for hyponatremia. Pt at baseline is Independent for ADLs Independent for high level IADLs and Independent for functional transfers/mobility. Pt currently presents w/ deficits in ADL and high level IADL independence, functional activity tolerance, dynamic sitting and standing balance and tolerance and functional transfers and BUE strength. Pt would benefit from continued acute care OT services w/ discharge to SNF  Complexity: Moderate  Prognosis: Good, no significant barriers to participation at this time. Occupational Therapy Plan  Times Per Week: 3x+  Times Per Day:  Once a day  Current Treatment Recommendations: Strengthening, ROM, Balance training, Functional mobility training, Endurance training, Patient/Caregiver education & training, Self-Care / ADL, Pain management, Equipment evaluation, education, & procurement, Safety education & training, Home management training, Positioning     Equipment: defer    Goals:  Pt goal: go home  Time Frame for STGs: discharge  Goal 1: Pt will perform UE ADLs Independent  Goal 2: Pt will perform LE ADLs Independent  Goal 3: Pt will perform toileting Independent  Goal 4: Pt will perform functional transfer w/ AD Son  Goal 5: Pt will perform functional mobility w/ AD Son  Goal 6: Pt will perform therex/theract in order to increase functional activity tolerance and dynamic standing balance    Treatment plan:  Pt will perform functional task in stand reaching in all 3 planes in order to increase dynamic standing balance and functional activity tolerance    Recommendations for NURSING activity: Up to chair for all 3 meals and up to [facility] for all toileting needs    Time:   Time in: 1136  Time out: 1201  Timed treatment minutes: 10 minutes  Total time: 25 minutes    Electronically signed by:    Bernadette BUSTILLO/L [id number]  12:08 PM,10/8/2022

## 2022-10-08 NOTE — PROGRESS NOTES
In-Patient Progress Note    Patient: Toni Alonso 78 y.o. female MRN: [id number]     Date of Service: 10/8/2022    Hospital Day: 3      Chief complaint: had concerns including Emesis, Nausea, and Fatigue. Subjective   Patient seen and examined in the afternoon. Patient states she feels a little better. Still weak and fatigued. No new complaints. Patient asked when she can go home - informed her it might be another 2-3 days. See ROS    I have reviewed all pertinent PMHx, PSHx, FamHx, SocialHx, medications, and allergies and updated history as appropriate. I have reviewed images as appropriate. Assessment and Plan   Toni Alonso, a 78 y.o. female, with a history of A. Fib on apixaban, urinary retention, OA, osteoporosis, hypothyroidism, GERD DDD was admitted on 10/6/2022 with complaints of had concerns including Emesis, Nausea, and Fatigue. Assessment  Severe Hyponatremia 2/2 likely hypovolemic 2/2 #2, #3  Initially presented with Na 114  Rapidly corrected to 126 (after fluid bolus) and now 119   Nephro on board   BMP Q4H  On NaCl drip    Vomiting and Diarrhea 2/2 #3 vs medication induced - Resolved  Started Sunday  PCP recommended increasing fluid intake but patient unable to keep it down    Sepsis (present on admission)  2/2 COVID 19 (unvaccinated) and acute cystitis without hematuria  Tested positive 10/5/2022  Unvaccinated   Was given script for paxlovid, steroids and lomotil by PCP but she felt worse so came in - did not even get to start taking them  Family refused remdesivir  Not on O2  CRP -ve  Febrile with T-max 100, urine culture with klebsiella, wbc 2.5 today     Leukopenia (Neutropenia) and Thrombocytopenia 2/2 likely #3  Initial WBC 1.0 improving to 2.5  Platelet 570  UA with trace leukocyte esterase and -ve nitrites. 6 wbc. She did come in with retention however. H/O Permanent A.  Fib on apixaban  On propafenone at home    Acute urinary retention in the setting of H/O urinary retention 2/2 possible cystitis without hematuria  CT abdomen with moderate Rt. And mild Lt. Hydronephrosis due to bladder distension Urothelial thickening +, UA with race leukocyte esterase and -ve nitrites. 6 wbc  Escamilla present    Hyperglycemia 2/2 likely stress induced  On MDSS   A1c 4.9% (10/6/2022)    H/O OA, Osteoporosis, DDD    H/O Hypothyroidism  On synthroid 137mcg OD at home   Most recent TSH and FT4 0.16 and 2.0 - 9/6/2022    H/O GERD    Diverticulosis without diverticulitis       Plan  F/U nephro recs   Hyponatremia management per nephro   Continue home meds   Ok to defer remdesivir - not requiring O2 and CRP low. Continue escamilla for now due to still severe hyponatermia and requiring fluid management with strict I/O  Will start on ceftriaxone 1gm Q24H for cystitis - patient with low WBC, urine culture growing klebsiella and came in with retention  Transfer to step down - Nephro is ok with it. PT/OT consult  Blood cultures   Mg and PO4 in the AM  Seizure precaution       # Peptic ulcer prophylaxis: Famotidine  # DVT Prophylaxis: Apixaban  #CODE STATUS: Full      Current living situation: Home  Expected Disposition: Home  Estimated discharge date: 2-3 days. Slow correction of hyponatremia. On IV abx      Review of System     Review of Systems   Constitutional:  Positive for fatigue. Negative for chills, fever and unexpected weight change. Eyes:  Negative for pain and visual disturbance. Respiratory:  Negative for cough, choking, chest tightness, shortness of breath, wheezing and stridor. Cardiovascular:  Negative for chest pain, palpitations and leg swelling. Gastrointestinal:  Negative for abdominal distention, abdominal pain, blood in stool, constipation, diarrhea, nausea and vomiting. Genitourinary:  Negative for decreased urine volume, dysuria, frequency, hematuria and urgency. Musculoskeletal:  Negative for arthralgias, back pain and myalgias. Skin:  Negative for pallor and rash. Neurological:  Positive for weakness. Negative for dizziness, tremors, syncope, speech difficulty, light-headedness, numbness and headaches. Psychiatric/Behavioral:  Negative for agitation. I have reviewed all pertinent PMHx, PSHx, FamHx, SocialHx, medications, and allergies and updated history as appropriate. Physical Exam   VITAL SIGNS:  /63   Pulse 72   Temp 99.3 °F (37.4 °C) (Bladder)   Resp 19   Ht 5' 4.5\" (1.638 m)   Wt 135 lb (61.2 kg)   SpO2 96%   BMI 22.81 kg/m²   Tmax over 24 hours:  Temp (24hrs), Av.7 °F (37.6 °C), Min:99.3 °F (37.4 °C), Max:100 °F (37.8 °C)      Patient Vitals for the past 6 hrs:   BP Temp Temp src Pulse Resp SpO2   10/08/22 1200 128/63 99.3 °F (37.4 °C) Bladder 72 19 --   10/08/22 1100 116/69 -- -- 72 15 --   10/08/22 1000 108/67 -- -- 64 14 --   10/08/22 0900 111/64 99.5 °F (37.5 °C) Bladder 65 13 --   10/08/22 0808 -- -- -- -- -- 96 %   10/08/22 0801 101/62 -- -- 61 14 --           Intake/Output Summary (Last 24 hours) at 10/8/2022 1343  Last data filed at 10/8/2022 1143  Gross per 24 hour   Intake --   Output 1150 ml   Net -1150 ml       Wt Readings from Last 2 Encounters:   10/06/22 135 lb (61.2 kg)   22 138 lb (62.6 kg)     Body mass index is 22.81 kg/m². Physical Exam  Vitals and nursing note reviewed. Constitutional:       General: She is not in acute distress. Appearance: She is not ill-appearing, toxic-appearing or diaphoretic. HENT:      Head: Normocephalic and atraumatic. Right Ear: External ear normal.      Left Ear: External ear normal.      Nose: Nose normal.   Eyes:      General: No scleral icterus. Right eye: No discharge. Left eye: No discharge. Cardiovascular:      Rate and Rhythm: Normal rate. Rhythm irregularly irregular. Pulses: Normal pulses. Heart sounds: Normal heart sounds. No murmur heard. No friction rub. No gallop.    Pulmonary:      Effort: Pulmonary effort is normal. No respiratory distress. Breath sounds: Normal breath sounds. No stridor. No wheezing, rhonchi or rales. Abdominal:      General: Bowel sounds are normal. There is no distension. Palpations: Abdomen is soft. There is no mass. Tenderness: There is no abdominal tenderness. There is no guarding or rebound. Hernia: No hernia is present. Musculoskeletal:      Right lower leg: No edema. Left lower leg: No edema. Skin:     General: Skin is warm. Capillary Refill: Capillary refill takes less than 2 seconds. Neurological:      Mental Status: She is alert and oriented to person, place, and time. Psychiatric:         Mood and Affect: Mood normal.         Current Medications      magnesium sulfate  4,000 mg IntraVENous Once    propafenone  225 mg Oral BID    levothyroxine  137 mcg Oral Daily    famotidine  20 mg Oral BID    insulin lispro  0-8 Units SubCUTAneous TID WC    apixaban  5 mg Oral BID         Labs and Imaging Studies   Laboratory findings:  CT ABDOMEN PELVIS W IV CONTRAST Additional Contrast? None    Result Date: 10/6/2022  EXAMINATION: CT OF THE ABDOMEN AND PELVIS WITH CONTRAST 10/6/2022 5:03 pm TECHNIQUE: CT of the abdomen and pelvis was performed with the administration of intravenous contrast. Multiplanar reformatted images are provided for review. Automated exposure control, iterative reconstruction, and/or weight based adjustment of the mA/kV was utilized to reduce the radiation dose to as low as reasonably achievable. COMPARISON: None available. HISTORY: ORDERING SYSTEM PROVIDED HISTORY: abd pain TECHNOLOGIST PROVIDED HISTORY: Reason for exam:->abd pain Additional Contrast?->None Decision Support Exception - unselect if not a suspected or confirmed emergency medical condition->Emergency Medical Condition (MA) Reason for Exam: abd pain FINDINGS: Lower Chest: Dependent atelectasis noted bilaterally. Visualized lungs otherwise clear. Base of the heart unremarkable.  Organs: Liver enhances normally. Gallbladder unremarkable. Portal vein patent. Spleen unremarkable. Adrenals unremarkable. Pancreas unremarkable. There is moderate right and mild left hydronephrosis, likely secondary to urinary bladder distention. Urothelial thickening is seen bilaterally, greater on the right. GI/Bowel: Small hiatal hernia. Stomach otherwise unremarkable. No small bowel abnormalities are identified. Moderate diverticulosis of the large bowel is seen, without convincing CT evidence of diverticulitis. Appendix is elongated, but the tip is gas-filled, and no asymmetric periappendiceal fat stranding is identified. Pelvis: Small to moderate amount of free pelvic fluid is identified. There is moderate to marked urinary bladder distention, with urinary bladder measuring approximately 14 cm in cranial caudal dimension. Pelvic floor laxity is noted uterus is surgically absent. Peritoneum/Retroperitoneum: Abdominal aorta normal in caliber. Superior mesenteric artery is enhancing. No lymphadenopathy. Bones/Soft Tissues: No acute or suspicious bony abnormality identified. The extra-abdominal and extra pelvic soft tissues are unremarkable. Moderate right and mild left hydronephrosis, which is likely secondary to back pressure from urinary bladder distension. There is urothelial thickening, and therefore air tract infection/inflammation should be considered. Small to moderate amount of free pelvic fluid, which is presumably reactive. Moderate diverticulosis of the large bowel, but without convincing CT evidence of diverticulitis. Small hiatal hernia. XR CHEST PORTABLE    Result Date: 10/6/2022  EXAMINATION: ONE XRAY VIEW OF THE CHEST 10/6/2022 5:38 pm COMPARISON: 11/01/2020. HISTORY: ORDERING SYSTEM PROVIDED HISTORY: cough TECHNOLOGIST PROVIDED HISTORY: Reason for exam:->cough Reason for Exam: cough FINDINGS: The heart is enlarged. Vascular calcifications are noted along the aortic arch.   There is a dextroscoliosis in the mid-lower thoracic spine. There is pulmonary vascular congestion. There is blunting of the right lateral costophrenic angle, stable, likely related to pleural scar. Atelectasis is noted in the lung bases. The lung apices are clear. The bones are osteopenic. 1. Mild pulmonary vascular congestion.        Recent Results (from the past 24 hour(s))   Basic Metabolic Panel    Collection Time: 10/07/22  1:50 PM   Result Value Ref Range    Sodium 119 (LL) 135 - 145 MMOL/L    Potassium 3.5 3.5 - 5.1 MMOL/L    Chloride 86 (L) 99 - 110 mMol/L    CO2 24 21 - 32 MMOL/L    Anion Gap 9 4 - 16    BUN 7 6 - 23 MG/DL    Creatinine 0.7 0.6 - 1.1 MG/DL    Glucose 139 (H) 70 - 99 MG/DL    Calcium 7.7 (L) 8.3 - 10.6 MG/DL    GFR Non-African American >60 >60 mL/min/1.73m2    GFR African American >60 >60 mL/min/1.73m2   POCT Glucose    Collection Time: 10/07/22  4:51 PM   Result Value Ref Range    POC Glucose 99 70 - 99 MG/DL   Basic Metabolic Panel    Collection Time: 10/07/22  4:54 PM   Result Value Ref Range    Sodium 118 (LL) 135 - 145 MMOL/L    Potassium 3.9 3.5 - 5.1 MMOL/L    Chloride 86 (L) 99 - 110 mMol/L    CO2 26 21 - 32 MMOL/L    Anion Gap 6 4 - 16    BUN 7 6 - 23 MG/DL    Creatinine 0.7 0.6 - 1.1 MG/DL    Glucose 98 70 - 99 MG/DL    Calcium 7.7 (L) 8.3 - 10.6 MG/DL    GFR Non-African American >60 >60 mL/min/1.73m2    GFR African American >60 >60 mL/min/1.73m2   TSH    Collection Time: 10/07/22  4:54 PM   Result Value Ref Range    TSH, High Sensitivity 2.140 0.270 - 4.20 uIu/ml   T4, Free    Collection Time: 10/07/22  4:54 PM   Result Value Ref Range    T4 Free 1.57 0.9 - 1.8 NG/DL   Basic Metabolic Panel    Collection Time: 10/07/22  8:17 PM   Result Value Ref Range    Sodium 117 (LL) 135 - 145 MMOL/L    Potassium 3.8 3.5 - 5.1 MMOL/L    Chloride 86 (L) 99 - 110 mMol/L    CO2 24 21 - 32 MMOL/L    Anion Gap 7 4 - 16    BUN 6 6 - 23 MG/DL    Creatinine 0.6 0.6 - 1.1 MG/DL    Glucose 97 70 - 99 MG/DL Calcium 7.5 (L) 8.3 - 10.6 MG/DL    GFR Non-African American >60 >60 mL/min/1.73m2    GFR  >60 >60 FM/PIF/4.09Y3   Basic Metabolic Panel    Collection Time: 10/08/22 12:25 AM   Result Value Ref Range    Sodium 119 (LL) 135 - 145 MMOL/L    Potassium 3.7 3.5 - 5.1 MMOL/L    Chloride 88 (L) 99 - 110 mMol/L    CO2 26 21 - 32 MMOL/L    Anion Gap 5 4 - 16    BUN 6 6 - 23 MG/DL    Creatinine 0.7 0.6 - 1.1 MG/DL    Glucose 90 70 - 99 MG/DL    Calcium 7.4 (L) 8.3 - 10.6 MG/DL    GFR Non-African American >60 >60 mL/min/1.73m2    GFR  >60 >60 XR/MUG/5.10L1   Basic Metabolic Panel w/ Reflex to MG    Collection Time: 10/08/22  4:08 AM   Result Value Ref Range    Sodium 119 (LL) 135 - 145 MMOL/L    Potassium 3.8 3.5 - 5.1 MMOL/L    Chloride 88 (L) 99 - 110 mMol/L    CO2 25 21 - 32 MMOL/L    Anion Gap 6 4 - 16    BUN 6 6 - 23 MG/DL    Creatinine 0.7 0.6 - 1.1 MG/DL    Glucose 91 70 - 99 MG/DL    Calcium 7.5 (L) 8.3 - 10.6 MG/DL    GFR Non-African American >60 >60 mL/min/1.73m2    GFR African American >60 >60 mL/min/1.73m2   CBC with Auto Differential    Collection Time: 10/08/22  4:08 AM   Result Value Ref Range    WBC 2.5 (L) 4.0 - 10.5 K/CU MM    RBC 4.16 (L) 4.2 - 5.4 M/CU MM    Hemoglobin 12.5 12.5 - 16.0 GM/DL    Hematocrit 36.1 (L) 37 - 47 %    MCV 86.8 78 - 100 FL    MCH 30.0 27 - 31 PG    MCHC 34.6 32.0 - 36.0 %    RDW 11.7 11.7 - 14.9 %    Platelets 984 (L) 792 - 440 K/CU MM    MPV 10.1 7.5 - 11.1 FL    Differential Type AUTOMATED DIFFERENTIAL     Segs Relative 43.1 36 - 66 %    Lymphocytes % 43.1 24 - 44 %    Monocytes % 13.4 (H) 0 - 4 %    Eosinophils % 0.0 0 - 3 %    Basophils % 0.0 0 - 1 %    Segs Absolute 1.1 K/CU MM    Lymphocytes Absolute 1.1 K/CU MM    Monocytes Absolute 0.3 K/CU MM    Eosinophils Absolute 0.0 K/CU MM    Basophils Absolute 0.0 K/CU MM    Nucleated RBC % 0.0 %    Total Nucleated RBC 0.0 K/CU MM    Total Immature Neutrophil 0.01 K/CU MM    Immature Neutrophil % 0.4 0 - 0.43 %   Hepatic Function Panel    Collection Time: 10/08/22  4:08 AM   Result Value Ref Range    Albumin 2.9 (L) 3.4 - 5.0 GM/DL    Total Bilirubin 0.3 0.0 - 1.0 MG/DL    Bilirubin, Direct 0.2 0.0 - 0.3 MG/DL    Bilirubin, Indirect 0.1 0 - 0.7 MG/DL    Alkaline Phosphatase 67 40 - 129 IU/L    AST 25 15 - 37 IU/L    ALT 17 10 - 40 U/L    Total Protein 4.9 (L) 6.4 - 8.2 GM/DL   Magnesium    Collection Time: 10/08/22  4:08 AM   Result Value Ref Range    Magnesium 1.5 (L) 1.8 - 2.4 mg/dl   Phosphorus    Collection Time: 10/08/22  4:08 AM   Result Value Ref Range    Phosphorus 2.9 2.5 - 4.9 MG/DL   Basic Metabolic Panel    Collection Time: 10/08/22  9:10 AM   Result Value Ref Range    Sodium 120 (L) 135 - 145 MMOL/L    Potassium 4.1 3.5 - 5.1 MMOL/L    Chloride 88 (L) 99 - 110 mMol/L    CO2 24 21 - 32 MMOL/L    Anion Gap 8 4 - 16    BUN 6 6 - 23 MG/DL    Creatinine 0.6 0.6 - 1.1 MG/DL    Glucose 91 70 - 99 MG/DL    Calcium 7.5 (L) 8.3 - 10.6 MG/DL    GFR Non-African American >60 >60 mL/min/1.73m2    GFR African American >60 >60 mL/min/1.73m2   Procalcitonin    Collection Time: 10/08/22  9:10 AM   Result Value Ref Range    Procalcitonin 7.839    Basic Metabolic Panel    Collection Time: 10/08/22  1:10 PM   Result Value Ref Range    Sodium 119 (LL) 135 - 145 MMOL/L    Potassium 3.9 3.5 - 5.1 MMOL/L    Chloride 87 (L) 99 - 110 mMol/L    CO2 25 21 - 32 MMOL/L    Anion Gap 7 4 - 16    BUN 7 6 - 23 MG/DL    Creatinine 0.6 0.6 - 1.1 MG/DL    Glucose 121 (H) 70 - 99 MG/DL    Calcium 7.5 (L) 8.3 - 10.6 MG/DL    GFR Non-African American >60 >60 mL/min/1.73m2    GFR African American >60 >60 mL/min/1.73m2           Electronically signed by Jeromy Fox MD on 10/8/2022 at 1:43 PM

## 2022-10-08 NOTE — PROGRESS NOTES
Nephrology Progress Note  10/8/2022 8:35 AM  Subjective: Interval History: Sonya Lutz is a 78 y.o. female with weakness some mild confusion resting in bed        Data:   Scheduled Meds:   magnesium sulfate  4,000 mg IntraVENous Once    propafenone  225 mg Oral BID    levothyroxine  137 mcg Oral Daily    famotidine  20 mg Oral BID    insulin lispro  0-8 Units SubCUTAneous TID WC    apixaban  5 mg Oral BID     Continuous Infusions:   sodium chloride 50 mL/hr at 10/07/22 1900    dextrose           CBC   Recent Labs     10/06/22  1729 10/07/22  0430 10/08/22  0408   WBC 1.0* 2.6* 2.5*   HGB 13.3 13.3 12.5   HCT 37.2 37.6 36.1*   * 112* 105*      BMP   Recent Labs     10/07/22  2017 10/08/22  0025 10/08/22  0408   * 119* 119*   K 3.8 3.7 3.8   CL 86* 88* 88*   CO2 24 26 25   PHOS  --   --  2.9   BUN 6 6 6   CREATININE 0.6 0.7 0.7     Hepatic:   Recent Labs     10/06/22  1729 10/07/22  0430 10/08/22  0408   AST 29 25 25   ALT 20 19 17   BILITOT 0.4 0.3 0.3   ALKPHOS 71 70 67     Troponin: No results for input(s): TROPONINI in the last 72 hours. BNP: No results for input(s): BNP in the last 72 hours. Lipids: No results for input(s): CHOL, HDL in the last 72 hours. Invalid input(s): LDLCALCU  ABGs: No results found for: PHART, PO2ART, AUF7KXK  INR: No results for input(s): INR in the last 72 hours.   Renal Labs  Albumin:    Lab Results   Component Value Date/Time    LABALBU 2.9 10/08/2022 04:08 AM     Calcium:    Lab Results   Component Value Date/Time    CALCIUM 7.5 10/08/2022 04:08 AM     Phosphorus:    Lab Results   Component Value Date/Time    PHOS 2.9 10/08/2022 04:08 AM     U/A:    Lab Results   Component Value Date/Time    NITRU NEGATIVE 10/06/2022 06:46 PM    COLORU YELLOW 10/06/2022 06:46 PM    PHUR 6.5 07/22/2021 12:01 PM    WBCUA 6 10/06/2022 06:46 PM    RBCUA <1 10/06/2022 06:46 PM    TRICHOMONAS NONE SEEN 10/06/2022 06:46 PM    BACTERIA NEGATIVE 10/06/2022 06:46 PM    CLARITYU CLEAR 10/06/2022 06:46 PM    SPECGRAV 1.015 10/06/2022 06:46 PM    UROBILINOGEN 0.2 10/06/2022 06:46 PM    BILIRUBINUR NEGATIVE 10/06/2022 06:46 PM    BILIRUBINUR neg 07/22/2021 12:01 PM    BLOODU NEGATIVE 10/06/2022 06:46 PM    GLUCOSEU neg 07/22/2021 12:01 PM    KETUA 40 10/06/2022 06:46 PM     ABG:  No results found for: PHART, SSC2NVD, PO2ART, CFK7UTX, BEART, THGBART, TVA3YAX, U6HDCGQX  HgBA1c:    Lab Results   Component Value Date/Time    LABA1C 4.9 10/06/2022 05:29 PM     Microalbumen/Creatinine ratio:  No components found for: RUCREAT  TSH:    Lab Results   Component Value Date/Time    TSH 0.16 09/06/2022 10:28 AM     IRON:  No results found for: IRON  Iron Saturation:  No components found for: PERCENTFE  TIBC:  No results found for: TIBC  FERRITIN:  No results found for: FERRITIN  RPR:  No results found for: RPR  DINESH:  No results found for: ANATITER, DINESH  24 Hour Urine for Creatinine Clearance:  No components found for: CREAT4, UHRS10, UTV10      Objective:   I/O: 10/07 0701 - 10/08 0700  In: 634.8 [P.O.:120; I.V.:514.8]  Out: 1050 [Urine:1050]  I/O last 3 completed shifts: In: 634.8 [P.O.:120; I.V.:514.8]  Out: 1500 [Urine:1500]  I/O this shift:  In: -   Out: 550 [Urine:550]  Vitals: /62   Pulse 61   Temp 99.7 °F (37.6 °C) (Bladder)   Resp 14   Ht 5' 4.5\" (1.638 m)   Wt 135 lb (61.2 kg)   SpO2 96%   BMI 22.81 kg/m²  {  General appearance: awake weak  HEENT: Head: Normal, normocephalic, atraumatic.   Neck: supple, symmetrical, trachea midline  Lungs: diminished breath sounds bilaterally  Heart: S1, S2 normal  Abdomen: abnormal findings:  soft nt  Extremities: edema trace  Neurologic: Mental status: alertness: Awake mild confusion        Assessment and Plan:      IMP:  #1 hyponatremia  #2 possible bladder obstruction bilateral hydronephrosis  #3 COVID-19 positive  #4 hypotension    Plan     #1 sodium which has been increased to rapidly has dropped down to 117 started normal saline IV fluids and sodium holding at 119 this morning and affect appears slightly better will maintain current fluids and monitor  #2 status post Nixon stable urine output renal function stable  #3 treat in above setting for COVID  #4 blood pressure low stable albumin only 2.9 work on nutrition monitor with supportive care for now  Will follow appears slightly better           Glynn Rondon MD, MD

## 2022-10-09 LAB
ANION GAP SERPL CALCULATED.3IONS-SCNC: 10 MMOL/L (ref 4–16)
ANION GAP SERPL CALCULATED.3IONS-SCNC: 12 MMOL/L (ref 4–16)
ANION GAP SERPL CALCULATED.3IONS-SCNC: 5 MMOL/L (ref 4–16)
BASOPHILS ABSOLUTE: 0 K/CU MM
BASOPHILS RELATIVE PERCENT: 0 % (ref 0–1)
BUN BLDV-MCNC: 5 MG/DL (ref 6–23)
CALCIUM SERPL-MCNC: 7.6 MG/DL (ref 8.3–10.6)
CALCIUM SERPL-MCNC: 7.8 MG/DL (ref 8.3–10.6)
CALCIUM SERPL-MCNC: 8 MG/DL (ref 8.3–10.6)
CHLORIDE BLD-SCNC: 94 MMOL/L (ref 99–110)
CHLORIDE BLD-SCNC: 94 MMOL/L (ref 99–110)
CHLORIDE BLD-SCNC: 97 MMOL/L (ref 99–110)
CO2: 24 MMOL/L (ref 21–32)
CO2: 26 MMOL/L (ref 21–32)
CO2: 29 MMOL/L (ref 21–32)
CREAT SERPL-MCNC: 0.6 MG/DL (ref 0.6–1.1)
CREAT SERPL-MCNC: 0.7 MG/DL (ref 0.6–1.1)
CREAT SERPL-MCNC: 0.7 MG/DL (ref 0.6–1.1)
CULTURE: ABNORMAL
CULTURE: ABNORMAL
DIFFERENTIAL TYPE: ABNORMAL
EOSINOPHILS ABSOLUTE: 0 K/CU MM
EOSINOPHILS RELATIVE PERCENT: 0 % (ref 0–3)
GFR AFRICAN AMERICAN: >60 ML/MIN/1.73M2
GFR NON-AFRICAN AMERICAN: >60 ML/MIN/1.73M2
GLUCOSE BLD-MCNC: 90 MG/DL (ref 70–99)
GLUCOSE BLD-MCNC: 92 MG/DL (ref 70–99)
GLUCOSE BLD-MCNC: 95 MG/DL (ref 70–99)
HCT VFR BLD CALC: 36.1 % (ref 37–47)
HEMOGLOBIN: 12.4 GM/DL (ref 12.5–16)
IMMATURE NEUTROPHIL %: 0.4 % (ref 0–0.43)
LYMPHOCYTES ABSOLUTE: 0.9 K/CU MM
LYMPHOCYTES RELATIVE PERCENT: 33 % (ref 24–44)
Lab: ABNORMAL
MAGNESIUM: 2.1 MG/DL (ref 1.8–2.4)
MCH RBC QN AUTO: 30.5 PG (ref 27–31)
MCHC RBC AUTO-ENTMCNC: 34.3 % (ref 32–36)
MCV RBC AUTO: 88.7 FL (ref 78–100)
MONOCYTES ABSOLUTE: 0.3 K/CU MM
MONOCYTES RELATIVE PERCENT: 11.3 % (ref 0–4)
NUCLEATED RBC %: 0 %
PDW BLD-RTO: 11.7 % (ref 11.7–14.9)
PHOSPHORUS: 2.7 MG/DL (ref 2.5–4.9)
PLATELET # BLD: 101 K/CU MM (ref 140–440)
PMV BLD AUTO: 9.6 FL (ref 7.5–11.1)
POTASSIUM SERPL-SCNC: 3.8 MMOL/L (ref 3.5–5.1)
POTASSIUM SERPL-SCNC: 3.8 MMOL/L (ref 3.5–5.1)
POTASSIUM SERPL-SCNC: 3.9 MMOL/L (ref 3.5–5.1)
RBC # BLD: 4.07 M/CU MM (ref 4.2–5.4)
REASON FOR REJECTION: NORMAL
REJECTED TEST: NORMAL
SEGMENTED NEUTROPHILS ABSOLUTE COUNT: 1.6 K/CU MM
SEGMENTED NEUTROPHILS RELATIVE PERCENT: 55.3 % (ref 36–66)
SODIUM BLD-SCNC: 130 MMOL/L (ref 135–145)
SODIUM BLD-SCNC: 130 MMOL/L (ref 135–145)
SODIUM BLD-SCNC: 131 MMOL/L (ref 135–145)
SPECIMEN: ABNORMAL
TOTAL IMMATURE NEUTOROPHIL: 0.01 K/CU MM
TOTAL NUCLEATED RBC: 0 K/CU MM
WBC # BLD: 2.8 K/CU MM (ref 4–10.5)

## 2022-10-09 PROCEDURE — 6360000002 HC RX W HCPCS: Performed by: INTERNAL MEDICINE

## 2022-10-09 PROCEDURE — 83735 ASSAY OF MAGNESIUM: CPT

## 2022-10-09 PROCEDURE — 2100000000 HC CCU R&B

## 2022-10-09 PROCEDURE — 85025 COMPLETE CBC W/AUTO DIFF WBC: CPT

## 2022-10-09 PROCEDURE — 6370000000 HC RX 637 (ALT 250 FOR IP): Performed by: STUDENT IN AN ORGANIZED HEALTH CARE EDUCATION/TRAINING PROGRAM

## 2022-10-09 PROCEDURE — 2580000003 HC RX 258: Performed by: INTERNAL MEDICINE

## 2022-10-09 PROCEDURE — 6370000000 HC RX 637 (ALT 250 FOR IP): Performed by: INTERNAL MEDICINE

## 2022-10-09 PROCEDURE — 84100 ASSAY OF PHOSPHORUS: CPT

## 2022-10-09 PROCEDURE — 6370000000 HC RX 637 (ALT 250 FOR IP): Performed by: NURSE PRACTITIONER

## 2022-10-09 PROCEDURE — 80048 BASIC METABOLIC PNL TOTAL CA: CPT

## 2022-10-09 PROCEDURE — 94761 N-INVAS EAR/PLS OXIMETRY MLT: CPT

## 2022-10-09 PROCEDURE — 36415 COLL VENOUS BLD VENIPUNCTURE: CPT

## 2022-10-09 RX ORDER — DEXTROSE MONOHYDRATE 50 MG/ML
INJECTION, SOLUTION INTRAVENOUS CONTINUOUS
Status: DISPENSED | OUTPATIENT
Start: 2022-10-09 | End: 2022-10-09

## 2022-10-09 RX ADMIN — PROPAFENONE HYDROCHLORIDE 225 MG: 225 CAPSULE, EXTENDED RELEASE ORAL at 18:29

## 2022-10-09 RX ADMIN — FAMOTIDINE 20 MG: 20 TABLET ORAL at 08:15

## 2022-10-09 RX ADMIN — DEXTROSE MONOHYDRATE: 50 INJECTION, SOLUTION INTRAVENOUS at 06:50

## 2022-10-09 RX ADMIN — APIXABAN 5 MG: 5 TABLET, FILM COATED ORAL at 08:15

## 2022-10-09 RX ADMIN — FAMOTIDINE 20 MG: 20 TABLET ORAL at 20:38

## 2022-10-09 RX ADMIN — CEFTRIAXONE SODIUM 1000 MG: 1 INJECTION, POWDER, FOR SOLUTION INTRAMUSCULAR; INTRAVENOUS at 16:03

## 2022-10-09 RX ADMIN — LEVOTHYROXINE SODIUM 137 MCG: 25 TABLET ORAL at 05:24

## 2022-10-09 RX ADMIN — PROPAFENONE HYDROCHLORIDE 225 MG: 225 CAPSULE, EXTENDED RELEASE ORAL at 05:24

## 2022-10-09 RX ADMIN — APIXABAN 5 MG: 5 TABLET, FILM COATED ORAL at 20:38

## 2022-10-09 ASSESSMENT — ENCOUNTER SYMPTOMS
CHEST TIGHTNESS: 0
VOMITING: 0
DIARRHEA: 0
COUGH: 0
NAUSEA: 0
WHEEZING: 0
BLOOD IN STOOL: 0
ABDOMINAL DISTENTION: 0
CONSTIPATION: 0
STRIDOR: 0
SHORTNESS OF BREATH: 0
EYE PAIN: 0
ABDOMINAL PAIN: 0
CHOKING: 0
BACK PAIN: 0

## 2022-10-09 NOTE — PROGRESS NOTES
Nephrology Progress Note  10/9/2022 10:11 AM  Subjective: Interval History: Dereje Meier is a 78 y.o. female doing better overall today no distress using bedside commode      Data:   Scheduled Meds:   cefTRIAXone (ROCEPHIN) IV  1,000 mg IntraVENous Q24H    propafenone  225 mg Oral BID    levothyroxine  137 mcg Oral Daily    famotidine  20 mg Oral BID    apixaban  5 mg Oral BID     Continuous Infusions:   dextrose 50 mL/hr at 10/09/22 0650    dextrose           CBC   Recent Labs     10/07/22  0430 10/08/22  0408 10/09/22  0603   WBC 2.6* 2.5* 2.8*   HGB 13.3 12.5 12.4*   HCT 37.6 36.1* 36.1*   * 105* 101*      BMP   Recent Labs     10/08/22  0408 10/08/22  0910 10/08/22  1831 10/08/22  2339 10/09/22  0603   *   < > 121* 130* 131*   K 3.8   < > 4.2 3.8 3.9   CL 88*   < > 89* 94* 97*   CO2 25   < > 25 26 29   PHOS 2.9  --   --   --  2.7   BUN 6   < > 7 5* 5*   CREATININE 0.7   < > 0.8 0.7 0.6    < > = values in this interval not displayed. Hepatic:   Recent Labs     10/06/22  1729 10/07/22  0430 10/08/22  0408   AST 29 25 25   ALT 20 19 17   BILITOT 0.4 0.3 0.3   ALKPHOS 71 70 67     Troponin: No results for input(s): TROPONINI in the last 72 hours. BNP: No results for input(s): BNP in the last 72 hours. Lipids: No results for input(s): CHOL, HDL in the last 72 hours. Invalid input(s): LDLCALCU  ABGs: No results found for: PHART, PO2ART, QLU3XYZ  INR: No results for input(s): INR in the last 72 hours.   Renal Labs  Albumin:    Lab Results   Component Value Date/Time    LABALBU 2.9 10/08/2022 04:08 AM     Calcium:    Lab Results   Component Value Date/Time    CALCIUM 7.6 10/09/2022 06:03 AM     Phosphorus:    Lab Results   Component Value Date/Time    PHOS 2.7 10/09/2022 06:03 AM     U/A:    Lab Results   Component Value Date/Time    NITRU NEGATIVE 10/06/2022 06:46 PM    COLORU YELLOW 10/06/2022 06:46 PM    PHUR 6.5 07/22/2021 12:01 PM    WBCUA 6 10/06/2022 06:46 PM    RBCUA <1 10/06/2022 06:46 PM    TRICHOMONAS NONE SEEN 10/06/2022 06:46 PM    BACTERIA NEGATIVE 10/06/2022 06:46 PM    CLARITYU CLEAR 10/06/2022 06:46 PM    SPECGRAV 1.015 10/06/2022 06:46 PM    UROBILINOGEN 0.2 10/06/2022 06:46 PM    BILIRUBINUR NEGATIVE 10/06/2022 06:46 PM    BILIRUBINUR neg 07/22/2021 12:01 PM    BLOODU NEGATIVE 10/06/2022 06:46 PM    GLUCOSEU neg 07/22/2021 12:01 PM    KETUA 40 10/06/2022 06:46 PM     ABG:  No results found for: PHART, OMT8TUY, PO2ART, CIU7KRV, BEART, THGBART, LJP1ZNC, Z2KCGOYP  HgBA1c:    Lab Results   Component Value Date/Time    LABA1C 4.9 10/06/2022 05:29 PM     Microalbumen/Creatinine ratio:  No components found for: RUCREAT  TSH:    Lab Results   Component Value Date/Time    TSH 0.16 09/06/2022 10:28 AM     IRON:  No results found for: IRON  Iron Saturation:  No components found for: PERCENTFE  TIBC:  No results found for: TIBC  FERRITIN:  No results found for: FERRITIN  RPR:  No results found for: RPR  DINESH:  No results found for: ANATITER, DINESH  24 Hour Urine for Creatinine Clearance:  No components found for: CREAT4, UHRS10, UTV10      Objective:   I/O: 10/08 0701 - 10/09 0700  In: 2117.6 [P.O.:360; I.V.:1661.7]  Out: 5450 [Urine:5450]  I/O last 3 completed shifts: In: 2117.6 [P.O.:360; I.V.:1661.7; IV Piggyback:95.9]  Out: 5450 [Urine:5450]  No intake/output data recorded. Vitals: BP (!) 118/53   Pulse 66   Temp 97.7 °F (36.5 °C) (Oral)   Resp 17   Ht 5' 4.5\" (1.638 m)   Wt 142 lb 6.4 oz (64.6 kg)   SpO2 97%   BMI 24.07 kg/m²  {  General appearance: awake weak  HEENT: Head: Normal, normocephalic, atraumatic.   Neck: supple, symmetrical, trachea midline  Lungs: diminished breath sounds bilaterally  Heart: S1, S2 normal  Abdomen: abnormal findings:  soft nt  Extremities: edema trace  Neurologic: Mental status: alertness: Awake more lucid today        Assessment and Plan:      IMP:  #1 hyponatremia  #2 possible bladder obstruction bilateral hydronephrosis  #3 COVID-19 positive  #4 hypotension    Plan     #1 sodium low stable monitor  With increased give D5W for 4 hours and had overcorrected and then resume oral hydration normally and she should do well  #2 stable urination good urine output renal function stable  #3 treat in the setting of COVID overall improved  #4 blood pressure holding stable  Can work on discharge probably in the morning with medical management           Rosanna Peres MD, MD

## 2022-10-09 NOTE — PROGRESS NOTES
2.8  Platelet 041  UA with trace leukocyte esterase and -ve nitrites. 6 wbc. She did come in with retention however. Klebsiella on urine culture    H/O Permanent A. Fib on apixaban  On propafenone at home    Acute urinary retention in the setting of H/O urinary retention 2/2 possible cystitis without hematuria  CT abdomen with moderate Rt. And mild Lt. Hydronephrosis due to bladder distension Urothelial thickening +, UA with race leukocyte esterase and -ve nitrites. 6 wbc  Escamilla present     Hyperglycemia 2/2 likely stress induced  On MDSS   A1c 4.9% (10/6/2022)    H/O OA, Osteoporosis, DDD    H/O Hypothyroidism  On synthroid 137mcg OD at home   Most recent TSH and FT4 0.16 and 2.0 - 9/6/2022    H/O GERD    Diverticulosis without diverticulitis       Plan  F/U nephro recs   Hyponatremia management per nephro   Continue home meds   Ok to defer remdesivir - not requiring O2 and CRP low. Ok to discontinue escamilla   Ceftriaxone for 3 days - last dose tomorrow AM (10/10/2022 ) - ok to retime so can be discharged off abx  PT consult pending  Seizure precaution       # Peptic ulcer prophylaxis: Famotidine  # DVT Prophylaxis: Apixaban  #CODE STATUS: Full      Current living situation: Home  Expected Disposition: Home  Estimated discharge date: Plan for tomorrow AM . Slow correction of hyponatremia (underwent rapid correction again). On IV abx. Once ok with nephro. Will likely need CBC and BMP in a few days - for PCP to follow      Review of System     Review of Systems   Constitutional:  Positive for fatigue. Negative for chills, fever and unexpected weight change. Eyes:  Negative for pain and visual disturbance. Respiratory:  Negative for cough, choking, chest tightness, shortness of breath, wheezing and stridor. Cardiovascular:  Negative for chest pain, palpitations and leg swelling. Gastrointestinal:  Negative for abdominal distention, abdominal pain, blood in stool, constipation, diarrhea, nausea and vomiting. Genitourinary:  Negative for decreased urine volume, dysuria, frequency, hematuria and urgency. Musculoskeletal:  Negative for arthralgias, back pain and myalgias. Skin:  Negative for pallor and rash. Neurological:  Positive for weakness. Negative for dizziness, tremors, syncope, speech difficulty, light-headedness, numbness and headaches. Psychiatric/Behavioral:  Negative for agitation. I have reviewed all pertinent PMHx, PSHx, FamHx, SocialHx, medications, and allergies and updated history as appropriate. Physical Exam   VITAL SIGNS:  BP (!) 118/53   Pulse 66   Temp 97.7 °F (36.5 °C) (Oral)   Resp 17   Ht 5' 4.5\" (1.638 m)   Wt 142 lb 6.4 oz (64.6 kg)   SpO2 97%   BMI 24.07 kg/m²   Tmax over 24 hours:  Temp (24hrs), Av.2 °F (36.8 °C), Min:97.6 °F (36.4 °C), Max:99.3 °F (37.4 °C)      Patient Vitals for the past 6 hrs:   BP Temp Temp src Pulse Resp SpO2   10/09/22 0800 (!) 118/53 97.7 °F (36.5 °C) Oral 66 17 97 %   10/09/22 0700 -- -- -- 60 19 91 %           Intake/Output Summary (Last 24 hours) at 10/9/2022 1129  Last data filed at 10/9/2022 0531  Gross per 24 hour   Intake 2117.6 ml   Output 4900 ml   Net -2782.4 ml       Wt Readings from Last 2 Encounters:   10/09/22 142 lb 6.4 oz (64.6 kg)   22 138 lb (62.6 kg)     Body mass index is 24.07 kg/m². Physical Exam  Vitals and nursing note reviewed. Constitutional:       General: She is not in acute distress. Appearance: She is not ill-appearing, toxic-appearing or diaphoretic. HENT:      Head: Normocephalic and atraumatic. Right Ear: External ear normal.      Left Ear: External ear normal.      Nose: Nose normal.   Eyes:      General: No scleral icterus. Right eye: No discharge. Left eye: No discharge. Cardiovascular:      Rate and Rhythm: Normal rate. Rhythm irregularly irregular. Pulses: Normal pulses. Heart sounds: Normal heart sounds. No murmur heard. No friction rub. No gallop. Pulmonary:      Effort: Pulmonary effort is normal. No respiratory distress. Breath sounds: Normal breath sounds. No stridor. No wheezing, rhonchi or rales. Abdominal:      General: Bowel sounds are normal. There is no distension. Palpations: Abdomen is soft. There is no mass. Tenderness: There is no abdominal tenderness. There is no guarding or rebound. Hernia: No hernia is present. Musculoskeletal:      Right lower leg: No edema. Left lower leg: No edema. Skin:     General: Skin is warm. Capillary Refill: Capillary refill takes less than 2 seconds. Neurological:      Mental Status: She is alert and oriented to person, place, and time. Psychiatric:         Mood and Affect: Mood normal.         Current Medications      cefTRIAXone (ROCEPHIN) IV  1,000 mg IntraVENous Q24H    propafenone  225 mg Oral BID    levothyroxine  137 mcg Oral Daily    famotidine  20 mg Oral BID    apixaban  5 mg Oral BID         Labs and Imaging Studies   Laboratory findings:  CT ABDOMEN PELVIS W IV CONTRAST Additional Contrast? None    Result Date: 10/6/2022  EXAMINATION: CT OF THE ABDOMEN AND PELVIS WITH CONTRAST 10/6/2022 5:03 pm TECHNIQUE: CT of the abdomen and pelvis was performed with the administration of intravenous contrast. Multiplanar reformatted images are provided for review. Automated exposure control, iterative reconstruction, and/or weight based adjustment of the mA/kV was utilized to reduce the radiation dose to as low as reasonably achievable. COMPARISON: None available. HISTORY: ORDERING SYSTEM PROVIDED HISTORY: abd pain TECHNOLOGIST PROVIDED HISTORY: Reason for exam:->abd pain Additional Contrast?->None Decision Support Exception - unselect if not a suspected or confirmed emergency medical condition->Emergency Medical Condition (MA) Reason for Exam: abd pain FINDINGS: Lower Chest: Dependent atelectasis noted bilaterally. Visualized lungs otherwise clear.   Base of the heart unremarkable. Organs: Liver enhances normally. Gallbladder unremarkable. Portal vein patent. Spleen unremarkable. Adrenals unremarkable. Pancreas unremarkable. There is moderate right and mild left hydronephrosis, likely secondary to urinary bladder distention. Urothelial thickening is seen bilaterally, greater on the right. GI/Bowel: Small hiatal hernia. Stomach otherwise unremarkable. No small bowel abnormalities are identified. Moderate diverticulosis of the large bowel is seen, without convincing CT evidence of diverticulitis. Appendix is elongated, but the tip is gas-filled, and no asymmetric periappendiceal fat stranding is identified. Pelvis: Small to moderate amount of free pelvic fluid is identified. There is moderate to marked urinary bladder distention, with urinary bladder measuring approximately 14 cm in cranial caudal dimension. Pelvic floor laxity is noted uterus is surgically absent. Peritoneum/Retroperitoneum: Abdominal aorta normal in caliber. Superior mesenteric artery is enhancing. No lymphadenopathy. Bones/Soft Tissues: No acute or suspicious bony abnormality identified. The extra-abdominal and extra pelvic soft tissues are unremarkable. Moderate right and mild left hydronephrosis, which is likely secondary to back pressure from urinary bladder distension. There is urothelial thickening, and therefore air tract infection/inflammation should be considered. Small to moderate amount of free pelvic fluid, which is presumably reactive. Moderate diverticulosis of the large bowel, but without convincing CT evidence of diverticulitis. Small hiatal hernia. XR CHEST PORTABLE    Result Date: 10/6/2022  EXAMINATION: ONE XRAY VIEW OF THE CHEST 10/6/2022 5:38 pm COMPARISON: 11/01/2020. HISTORY: ORDERING SYSTEM PROVIDED HISTORY: cough TECHNOLOGIST PROVIDED HISTORY: Reason for exam:->cough Reason for Exam: cough FINDINGS: The heart is enlarged.   Vascular calcifications are noted along the aortic arch. There is a dextroscoliosis in the mid-lower thoracic spine. There is pulmonary vascular congestion. There is blunting of the right lateral costophrenic angle, stable, likely related to pleural scar. Atelectasis is noted in the lung bases. The lung apices are clear. The bones are osteopenic. 1. Mild pulmonary vascular congestion.        Recent Results (from the past 24 hour(s))   Basic Metabolic Panel    Collection Time: 10/08/22  1:10 PM   Result Value Ref Range    Sodium 119 (LL) 135 - 145 MMOL/L    Potassium 3.9 3.5 - 5.1 MMOL/L    Chloride 87 (L) 99 - 110 mMol/L    CO2 25 21 - 32 MMOL/L    Anion Gap 7 4 - 16    BUN 7 6 - 23 MG/DL    Creatinine 0.6 0.6 - 1.1 MG/DL    Glucose 121 (H) 70 - 99 MG/DL    Calcium 7.5 (L) 8.3 - 10.6 MG/DL    GFR Non-African American >60 >60 mL/min/1.73m2    GFR African American >60 >60 mL/min/1.73m2   POCT Glucose    Collection Time: 10/08/22  5:19 PM   Result Value Ref Range    POC Glucose 132 (H) 70 - 99 MG/DL   Basic Metabolic Panel    Collection Time: 10/08/22  6:31 PM   Result Value Ref Range    Sodium 121 (L) 135 - 145 MMOL/L    Potassium 4.2 3.5 - 5.1 MMOL/L    Chloride 89 (L) 99 - 110 mMol/L    CO2 25 21 - 32 MMOL/L    Anion Gap 7 4 - 16    BUN 7 6 - 23 MG/DL    Creatinine 0.8 0.6 - 1.1 MG/DL    Glucose 104 (H) 70 - 99 MG/DL    Calcium 7.6 (L) 8.3 - 10.6 MG/DL    GFR Non-African American >60 >60 mL/min/1.73m2    GFR  >60 >60 PL/XUN/6.30I1   Basic Metabolic Panel    Collection Time: 10/08/22 11:39 PM   Result Value Ref Range    Sodium 130 (L) 135 - 145 MMOL/L    Potassium 3.8 3.5 - 5.1 MMOL/L    Chloride 94 (L) 99 - 110 mMol/L    CO2 26 21 - 32 MMOL/L    Anion Gap 10 4 - 16    BUN 5 (L) 6 - 23 MG/DL    Creatinine 0.7 0.6 - 1.1 MG/DL    Glucose 92 70 - 99 MG/DL    Calcium 8.0 (L) 8.3 - 10.6 MG/DL    GFR Non-African American >60 >60 mL/min/1.73m2    GFR  >60 >60 KU/XCR/6.78Q4   Basic Metabolic Panel    Collection Time: 10/09/22 6:03 AM   Result Value Ref Range    Sodium 131 (L) 135 - 145 MMOL/L    Potassium 3.9 3.5 - 5.1 MMOL/L    Chloride 97 (L) 99 - 110 mMol/L    CO2 29 21 - 32 MMOL/L    Anion Gap 5 4 - 16    BUN 5 (L) 6 - 23 MG/DL    Creatinine 0.6 0.6 - 1.1 MG/DL    Glucose 90 70 - 99 MG/DL    Calcium 7.6 (L) 8.3 - 10.6 MG/DL    GFR Non-African American >60 >60 mL/min/1.73m2    GFR African American >60 >60 mL/min/1.73m2   CBC with Auto Differential    Collection Time: 10/09/22  6:03 AM   Result Value Ref Range    WBC 2.8 (L) 4.0 - 10.5 K/CU MM    RBC 4.07 (L) 4.2 - 5.4 M/CU MM    Hemoglobin 12.4 (L) 12.5 - 16.0 GM/DL    Hematocrit 36.1 (L) 37 - 47 %    MCV 88.7 78 - 100 FL    MCH 30.5 27 - 31 PG    MCHC 34.3 32.0 - 36.0 %    RDW 11.7 11.7 - 14.9 %    Platelets 997 (L) 304 - 440 K/CU MM    MPV 9.6 7.5 - 11.1 FL    Differential Type AUTOMATED DIFFERENTIAL     Segs Relative 55.3 36 - 66 %    Lymphocytes % 33.0 24 - 44 %    Monocytes % 11.3 (H) 0 - 4 %    Eosinophils % 0.0 0 - 3 %    Basophils % 0.0 0 - 1 %    Segs Absolute 1.6 K/CU MM    Lymphocytes Absolute 0.9 K/CU MM    Monocytes Absolute 0.3 K/CU MM    Eosinophils Absolute 0.0 K/CU MM    Basophils Absolute 0.0 K/CU MM    Nucleated RBC % 0.0 %    Total Nucleated RBC 0.0 K/CU MM    Total Immature Neutrophil 0.01 K/CU MM    Immature Neutrophil % 0.4 0 - 0.43 %   Magnesium    Collection Time: 10/09/22  6:03 AM   Result Value Ref Range    Magnesium 2.1 1.8 - 2.4 mg/dl   Phosphorus    Collection Time: 10/09/22  6:03 AM   Result Value Ref Range    Phosphorus 2.7 2.5 - 4.9 MG/DL           Electronically signed by Abhijeet Yun MD on 10/9/2022 at 11:29 AM

## 2022-10-10 ENCOUNTER — TELEPHONE (OUTPATIENT)
Dept: INTERNAL MEDICINE CLINIC | Age: 82
End: 2022-10-10

## 2022-10-10 VITALS
WEIGHT: 142.4 LBS | HEIGHT: 65 IN | TEMPERATURE: 97.7 F | OXYGEN SATURATION: 95 % | HEART RATE: 82 BPM | RESPIRATION RATE: 12 BRPM | SYSTOLIC BLOOD PRESSURE: 122 MMHG | BODY MASS INDEX: 23.72 KG/M2 | DIASTOLIC BLOOD PRESSURE: 71 MMHG

## 2022-10-10 LAB
ANION GAP SERPL CALCULATED.3IONS-SCNC: 8 MMOL/L (ref 4–16)
BASOPHILS ABSOLUTE: 0 K/CU MM
BASOPHILS RELATIVE PERCENT: 0.4 % (ref 0–1)
BUN BLDV-MCNC: 5 MG/DL (ref 6–23)
CALCIUM SERPL-MCNC: 8.1 MG/DL (ref 8.3–10.6)
CHLORIDE BLD-SCNC: 99 MMOL/L (ref 99–110)
CO2: 26 MMOL/L (ref 21–32)
CREAT SERPL-MCNC: 0.5 MG/DL (ref 0.6–1.1)
DIFFERENTIAL TYPE: ABNORMAL
EOSINOPHILS ABSOLUTE: 0 K/CU MM
EOSINOPHILS RELATIVE PERCENT: 0 % (ref 0–3)
GFR AFRICAN AMERICAN: >60 ML/MIN/1.73M2
GFR NON-AFRICAN AMERICAN: >60 ML/MIN/1.73M2
GLUCOSE BLD-MCNC: 92 MG/DL (ref 70–99)
HCT VFR BLD CALC: 40.9 % (ref 37–47)
HEMOGLOBIN: 14 GM/DL (ref 12.5–16)
IMMATURE NEUTROPHIL %: 0.4 % (ref 0–0.43)
LYMPHOCYTES ABSOLUTE: 1.1 K/CU MM
LYMPHOCYTES RELATIVE PERCENT: 45.9 % (ref 24–44)
MAGNESIUM: 2 MG/DL (ref 1.8–2.4)
MCH RBC QN AUTO: 30.7 PG (ref 27–31)
MCHC RBC AUTO-ENTMCNC: 34.2 % (ref 32–36)
MCV RBC AUTO: 89.7 FL (ref 78–100)
MONOCYTES ABSOLUTE: 0.3 K/CU MM
MONOCYTES RELATIVE PERCENT: 11.3 % (ref 0–4)
NUCLEATED RBC %: 0 %
PDW BLD-RTO: 11.9 % (ref 11.7–14.9)
PHOSPHORUS: 2.9 MG/DL (ref 2.5–4.9)
PLATELET # BLD: 111 K/CU MM (ref 140–440)
PMV BLD AUTO: 9.7 FL (ref 7.5–11.1)
POTASSIUM SERPL-SCNC: 4.2 MMOL/L (ref 3.5–5.1)
RBC # BLD: 4.56 M/CU MM (ref 4.2–5.4)
SEGMENTED NEUTROPHILS ABSOLUTE COUNT: 1 K/CU MM
SEGMENTED NEUTROPHILS RELATIVE PERCENT: 42 % (ref 36–66)
SODIUM BLD-SCNC: 133 MMOL/L (ref 135–145)
TOTAL IMMATURE NEUTOROPHIL: 0.01 K/CU MM
TOTAL NUCLEATED RBC: 0 K/CU MM
WBC # BLD: 2.3 K/CU MM (ref 4–10.5)

## 2022-10-10 PROCEDURE — 84100 ASSAY OF PHOSPHORUS: CPT

## 2022-10-10 PROCEDURE — 80048 BASIC METABOLIC PNL TOTAL CA: CPT

## 2022-10-10 PROCEDURE — 97530 THERAPEUTIC ACTIVITIES: CPT

## 2022-10-10 PROCEDURE — 94761 N-INVAS EAR/PLS OXIMETRY MLT: CPT

## 2022-10-10 PROCEDURE — 97162 PT EVAL MOD COMPLEX 30 MIN: CPT

## 2022-10-10 PROCEDURE — 2580000003 HC RX 258: Performed by: INTERNAL MEDICINE

## 2022-10-10 PROCEDURE — 6370000000 HC RX 637 (ALT 250 FOR IP): Performed by: NURSE PRACTITIONER

## 2022-10-10 PROCEDURE — 6360000002 HC RX W HCPCS: Performed by: INTERNAL MEDICINE

## 2022-10-10 PROCEDURE — 85025 COMPLETE CBC W/AUTO DIFF WBC: CPT

## 2022-10-10 PROCEDURE — 83735 ASSAY OF MAGNESIUM: CPT

## 2022-10-10 PROCEDURE — 36415 COLL VENOUS BLD VENIPUNCTURE: CPT

## 2022-10-10 PROCEDURE — 97116 GAIT TRAINING THERAPY: CPT

## 2022-10-10 PROCEDURE — 6370000000 HC RX 637 (ALT 250 FOR IP): Performed by: STUDENT IN AN ORGANIZED HEALTH CARE EDUCATION/TRAINING PROGRAM

## 2022-10-10 PROCEDURE — 6370000000 HC RX 637 (ALT 250 FOR IP): Performed by: INTERNAL MEDICINE

## 2022-10-10 RX ORDER — CIPROFLOXACIN 500 MG/1
500 TABLET, FILM COATED ORAL 2 TIMES DAILY
Qty: 8 TABLET | Refills: 0 | Status: SHIPPED | OUTPATIENT
Start: 2022-10-10 | End: 2022-10-13

## 2022-10-10 RX ADMIN — CEFTRIAXONE SODIUM 1000 MG: 1 INJECTION, POWDER, FOR SOLUTION INTRAMUSCULAR; INTRAVENOUS at 11:28

## 2022-10-10 RX ADMIN — FAMOTIDINE 20 MG: 20 TABLET ORAL at 08:05

## 2022-10-10 RX ADMIN — PROPAFENONE HYDROCHLORIDE 225 MG: 225 CAPSULE, EXTENDED RELEASE ORAL at 06:43

## 2022-10-10 RX ADMIN — LEVOTHYROXINE SODIUM 137 MCG: 25 TABLET ORAL at 06:43

## 2022-10-10 RX ADMIN — APIXABAN 5 MG: 5 TABLET, FILM COATED ORAL at 08:05

## 2022-10-10 NOTE — PROGRESS NOTES
Outpatient Pharmacy Progress Note for Meds-to-Beds    Patient was written a prescription for ciprofloxacin. Patient's family came to the pharmacy stated that they do not need this medication. The Rx for ciprofloxacin was returned. Thank you for letting us serve your patients.   [address]    [address], [address] [facility]    Phone: [phone]    Fax: [phone]

## 2022-10-10 NOTE — CARE COORDINATION
CM spoke with pt to initiate discharge planning. Role of CM explained. Pt has insurance and is able to afford medication. Pt has PCP. Pt is from home with spouse and son, Keiko Gerard. She is independent PTA. She has a walker, grab bars and a shower bench that belonged to her husbands parents whom they cared for. She does not use those items. She is not on home O2. She feels that therapy at home would be helpful as she is weak from Covid and from being in bed since admission. She is refusing SNF. She did not have a preference of home care companies and is agreeable to [facility]. CM contact information given to pt. CM team available as needs arise. PS sent to [facility] with [facility]. CM received VM from pt's daughter Royal Fuentes. CM return her call and got her VM. Left VM.

## 2022-10-10 NOTE — CARE COORDINATION
[facility] Liaison spoke with pt and is aware of discharge & will initiate [facility].

## 2022-10-10 NOTE — PROGRESS NOTES
Physical Therapy  [facility] ACUTE CARE PHYSICAL THERAPY EVALUATION  Sina Mason, 1940, -A, 10/10/2022    History  [place]:  The primary encounter diagnosis was Hyponatremia. Diagnoses of Elevated lipase, COVID, Hydronephrosis, unspecified hydronephrosis type, and Nausea vomiting and diarrhea were also pertinent to this visit. Patient  has a past medical history of AF (atrial fibrillation) ([address] Allergic rhinitis due to other allergen, Anxiety, Arthritis, Arthritis of big toe, B12 deficiency, BCC (basal cell carcinoma), face, Carpal tunnel syndrome, Congenital prolapse of bladder mucosa, COVID-19, DDD (degenerative disc disease), lumbar, Female incontinence, H/O cardiovascular stress test, H/O echocardiogram, H/O echocardiogram, Hiatal hernia, History of Doppler ultrasound, Homocysteinemia, HX OTHER MEDICAL, Hyperlipidemia, Hyponatremia, Hypothyroid, Lumbar herniated disc, Menopause, MTHFR mutation (methylenetetrahydrofolate reductase), Osteoarthritis of foot, right, Osteoporosis, Pain in shoulder, SBE (subacute bacterial endocarditis) prophylaxis candidate, VHD (valvular heart disease), and Vitamin D deficiency. Patient  has a past surgical history that includes  section (1965); Tonsillectomy (1943); Hysterectomy (10/21/2009); Rectocele repair (2011); Cataract removal with implant (Bilateral, 2012); bladder suspension (2009); Finger trigger release (Right); Skin cancer excision (Left, 2013); and Wrist fracture surgery (Left, 2020).     Subjective:  Patient states: \"I need to pack before I leave\"     Pain:  denies   Restrictions: droplet plus, falls     Home Setup/Prior level of function  Social/Functional History  Lives With: Spouse, Son (home )  Type of Home: House  Home Layout: One level  Home Access: Stairs to enter with rails  Entrance Stairs - Number of Steps: 3  Bathroom Shower/Tub: Tub/Shower unit  Bathroom Toilet: Standard  Bathroom Equipment: Shower chair  Home Equipment: Walker, standard  Has the patient had two or more falls in the past year or any fall with injury in the past year?: No  ADL Assistance: [address]: Independent  Homemaking Responsibilities: Yes  Ambulation Assistance: Independent (no AD)  Transfer Assistance: Independent  Active : Yes  Mode of Transportation: Car    Examination of body systems (includes body structures/functions, activity/participation limitations):  Observation:  Sitting in recliner upon arrival.  and 2 sons visiting. Cooperative with therapy   Vision:  WFL  Hearing:  [employer]  Cardiopulmonary:  stable vitals on room air   Orientation: oriented to person, place, and time     Musculoskeletal  ROM R/L:  [facility] BLEs  Strength R/L:  minimal weakness observed in function and endurance. Mobility/treatment:   Rolling L/R:  NT   Supine to sit:  NT, sitting in recliner upon arrival   Transfers:   Sit to stand: SBA for safety from recliner and standard toilet  Stand to sit: SBA for safety to standard toilet and recliner. Minimal VCS for hand sequencing back to seat surface for support and control lowering self   Step pivot: SBA for safety with RW (2x). Minimal VCS for sequencing full turn with AD prior to initiating sit   Sitting balance:  SBA for safety at recliner and toilet, static and light dynamic while managing dressing and hygiene tasks without UE support   Standing balance:  SBA for safety at RW as well as at sink and toilet while managing hygiene and dressing tasks with single to no UE support x ~5 minutes   Gait: ~75ft with RW CGA progressing to SBA for safety. Fair and consistent pace with reciprocal gait pattern and no LOB noted. 0ft without AD today feeling unsteady. Educated pt on POC, role of PT, DME use, discharge.  Cues provided for sequencing to inc safety and indep with mobility     [facility] 6 Clicks Inpatient Mobility:  AM-PAC Inpatient Mobility Raw Score : 18    Safety: patient left in chair, call light within reach,  gait belt used. Assessment:  Pt is an 78year old female admitted with diarrhea, nausea, and vomiting. Diagnosed with covid  and hyponatremia. Recommend home with 24/7 support from family, [place] PT, and initial use of RW once medically stable for discharge. At baseline she is indep with gross mobility and ADLs. She performed well this date though slight below baseline with dec balance, strength, and activity tolerance. She would benefit from continued therapy to address her current deficits, dec potential fall risk, and restore PLOF. Complexity: Moderate  Prognosis: Good, no significant barriers to participation at this time.    Plan: 3-5 times per week  Discharge Recommendations: 24 hour supervision or assist, Home with Home health PT, initial use of RW   Equipment: RW     Goals:  Short Term Goals  Time Frame for Short Term Goals: 2 weeks  Short Term Goal 1: Pt will perform bed mobility Son  Short Term Goal 2: Pt will transfer Son  Short Term Goal 3: Pt will ambulate 100ft with LRAD Son  Short Term Goal 4: Pt will perform standing light dynamic activity without UE support Son x 5 minutes       Treatment plan:  Bed mobility, transfers, balance, gait, TA, TX, stairs   Recommendations for NURSING mobility: ambulate with RW     Time:   Time in: 1137  Time out: 1210  Timed treatment minutes: 23   Total time: 33 minutes     Electronically signed by:    Ralph Cruz [id number]  10/10/2022, 12:58 PM

## 2022-10-10 NOTE — CARE COORDINATION
Dr Pati Aparicio, this is the phrase that you need to put in your note/order. Alicia Brooks was evaluated today and a DME order was entered for a wheeled walker because she requires this to successfully complete daily living tasks of eating, bathing, toileting, personal cares, ambulating, grooming, hygiene, dressing upper body, dressing lower body, meal preparation, and taking own medications. A wheeled walker is necessary due to the patient's unsteady gait, upper body weakness, and inability to  an ambulation device; and she can ambulate only by pushing a walker instead of a lesser assistive device such as a cane, crutch, or standard walker. The need for this equipment was discussed with the patient and she understands and is in agreement. MIKO spoke with Chance Quijano at [address] [place] [address] who stated that she will watch for the order. Family can  today or it can be delivered tomorrow.

## 2022-10-10 NOTE — PROGRESS NOTES
Reviewed dc instructions with pt, pt verbalized understanding. Pt transported to [facility] via wheelchair with RN writer, released to family at this time.

## 2022-10-10 NOTE — TELEPHONE ENCOUNTER
[facility] Transitions Initial Follow Up Call    Outreach made within 2 business days of discharge: Yes    Patient: Sina Mason Patient : 1940   MRN: [id number]  Reason for Admission: There are no discharge diagnoses documented for the most recent discharge. Discharge Date: 10/10/22       Spoke with: Patient    Discharge department/facility: [facility]    TCM Interactive Patient Contact:  Was patient able to fill all prescriptions: Yes  Was patient instructed to bring all medications to the follow-up visit: Yes  Is patient taking all medications as directed in the discharge summary?  Yes  Does patient understand their discharge instructions: Yes  Does patient have questions or concerns that need addressed prior to 7-14 day follow up office visit: no    Scheduled appointment with PCP within 7-14 days    Follow Up  Future Appointments   Date Time Provider Spike Bill   10/18/2022 11:15 AM Ria Hutchinson MD [address] [facility]   2023  1:00 PM Sonu Gonzalez MD [facility]   3/7/2023 10:15 AM MD Marilee [address] [facility]       Leslie Wiley MA No

## 2022-10-10 NOTE — DISCHARGE SUMMARY
Discharge Summary    Name:  Juliana Feldman /Age/Sex: 1940  (78 y.o. female)   MRN & CSN:  [id number] & [id number] Admission Date/Time: 10/6/2022  5:06 PM   Attending:  Charles Grullon MD Discharging Physician: Charles Grullon MD       Admission Diagnosis:   Hyponatremia  Diarrhea and vomiting  Sepsis  Acute cystitis  Acute urinary retention  Leukopenia  Thrombocytopenia  History of atrial fibrillation  History of osteoporosis  History of hypothyroidism  Diverticulosis  History of GERD    Discharge Diagnosis:    Hyponatremia  Diarrhea and vomiting  Sepsis  Acute cystitis  Acute urinary retention  Leukopenia  Thrombocytopenia  History of atrial fibrillation  History of osteoporosis  History of hypothyroidism  Diverticulosis  History of GERD      Discharge Exam  Physical Exam  Constitutional:       Appearance: Normal appearance. HENT:      Head: Normocephalic and atraumatic. Mouth/Throat:      Mouth: Mucous membranes are moist.      Pharynx: Oropharynx is clear. Eyes:      Extraocular Movements: Extraocular movements intact. Pupils: Pupils are equal, round, and reactive to light. Cardiovascular:      Rate and Rhythm: Normal rate and regular rhythm. Pulses: Normal pulses. Heart sounds: Normal heart sounds. Pulmonary:      Effort: Pulmonary effort is normal.      Breath sounds: Normal breath sounds. Abdominal:      General: Abdomen is flat. Bowel sounds are normal.   Musculoskeletal:         General: Normal range of motion. Skin:     General: Skin is warm and dry. Neurological:      General: No focal deficit present. Mental Status: She is alert and oriented to person, place, and time. Hospital Course: Juliana Feldman is a 78 y.o.  female  who presents with Hyponatremia    Above patient with history of A. fib on Eliquis, osteoarthritis, osteoporosis presented to ED on 10/6/2022 with complaints of nausea, vomiting, diarrhea and URI symptoms.   Her URI symptoms onset was 5 days prior to coming to hospital.  She tested positive for COVID-19 outside and had a primary care televisit. She was prescribed medications for COVID-19 but she was never able to . Her symptoms of vomiting and diarrhea worsened as well as she was experiencing decreased urine output, hence came to the ED. During initial presentation, patient was hemodynamically stable. Lab work showed hyponatremia with sodium of 114 mmol/liter, WBC of 1 and platelet of 534. CT abdomen pelvis was done which showed bilateral hydronephrosis and distended urinary bladder. Her creatinine on presentation was 0.9 mG/DL. She was subsequently to ICU for management of severe hyponatremia for which she was seen by nephrology. Patient's sodium level improved and was 133 mmol/liter prior to discharge. She was treated for UTI. Voiding trial was done prior to discharge and she was able to urinate without any difficulty. Patient refused SNF placement. She is getting discharged to home with [place] [address] in a stable condition. She will complete outpatient therapy with ciprofloxacin for UTI. DME order was entered for a wheeled walker because she requires this to successfully complete daily living tasks of eating, bathing, toileting, personal cares, ambulating, grooming, hygiene, dressing upper body, dressing lower body, meal preparation, and taking own medications. A wheeled walker is necessary due to the patient's unsteady gait, upper body weakness, and inability to  an ambulation device; and she can ambulate only by pushing a walker instead of a lesser assistive device such as a cane, crutch, or standard walker. The need for this equipment was discussed with the patient and she understands and is in agreement. The patient expressed appropriate understanding of and agreement with the discharge recommendations, medications, and plan.      Consults this admission:  IP CONSULT TO NEPHROLOGY  IP CONSULT TO HOSPITALIST  ARACELI CONSULT TO NEPHROLOGY  IP CONSULT TO PHARMACY  IP CONSULT TO HOME CARE NEEDS      Discharge Instruction:   Handoff to PCP:     Follow up appointments: nephrology   Primary care physician:     Diet:  regular diet   Activity: activity as tolerated  Disposition: Discharged to:   [x]Home, [x]HHC, []SNF, []Acute Rehab, []Hospice   Condition on discharge: Stable    Discharge Medications:        Medication List        START taking these medications      ciprofloxacin 500 MG tablet  Commonly known as: CIPRO  Take 1 tablet by mouth 2 times daily for 4 days            CHANGE how you take these medications      Synthroid 137 MCG tablet  Generic drug: levothyroxine  Take 1 tablet by mouth Daily [place] brand synthroid only  What changed: Another medication with the same name was removed. Continue taking this medication, and follow the directions you see here. CONTINUE taking these medications      acetaminophen 500 MG tablet  Commonly known as: APAP Extra Strength  Take 1 tablet by mouth every 6 hours as needed for Pain     B-12 1000 MCG Caps     Eliquis 5 MG Tabs tablet  Generic drug: apixaban  Take 1 tablet by mouth 2 times daily     guaiFENesin-codeine 100-10 MG/5ML syrup  Commonly known as: TUSSI-ORGANIDIN NR  Take 5 mLs by mouth 4 times daily as needed for Cough or Congestion for up to 7 days. pantoprazole 20 MG tablet  Commonly known as: PROTONIX  Take 1 tablet by mouth every morning (before breakfast)     propafenone 225 MG tablet  Commonly known as: RYTHMOL  Take 1 tablet by mouth in the morning and 1 tablet before bedtime. vitamin D3 25 MCG (1000 UT) Tabs tablet  Commonly known as: CHOLECALCIFEROL  Take 1 tablet by mouth daily.             STOP taking these medications      diphenoxylate-atropine 2.5-0.025 MG per tablet  Commonly known as: Diphenatol     Paxlovid (300/100) 20 x 150 MG & 10 x 100MG Tbpk  Generic drug: nirmatrelvir/ritonavir     predniSONE 5 MG tablet  Commonly known as: Harry Anon Where to Get Your Medications        These medications were sent to [address] [facility], [address] [facility] [address], [facility] [address]      Phone: [phone]   ciprofloxacin 500 MG tablet         Objective Findings at Discharge:       BMP/CBC  Recent Labs     10/08/22  0408 10/08/22  0910 10/09/22  0603 10/09/22  1033 10/10/22  0804   *   < > 131* 130* 133*   K 3.8   < > 3.9 3.8 4.2   CL 88*   < > 97* 94* 99   CO2 25   < > 29 24 26   BUN 6   < > 5* 5* 5*   CREATININE 0.7   < > 0.6 0.7 0.5*   WBC 2.5*  --  2.8*  --  2.3*   HCT 36.1*  --  36.1*  --  40.9   *  --  101*  --  111*    < > = values in this interval not displayed. IMAGING:      Additional Information: Patient seen and examined day of discharge.  For more information regarding patient's care please contact [facility] records [phone]    Discharge Time of 30 minutes    Electronically signed by Rizwan Quigley MD on 10/10/2022 at 11:41 AM

## 2022-10-10 NOTE — PROGRESS NOTES
Nephrology Progress Note  10/10/2022 7:26 AM        Subjective:   Admit Date: 10/6/2022  PCP: Norma Buchanan MD    Interval History: Weekend events, briefly reviewed  Transferred to CVICU    Diet: Reasonable per patient    ROS: No shortness of breath or overt confusion  No fever and acceptable blood pressure  Her Nixon catheter is out urine output recorded 800 cc for the last shift  Data:     Current meds:    cefTRIAXone (ROCEPHIN) IV  1,000 mg IntraVENous Q24H    propafenone  225 mg Oral BID    levothyroxine  137 mcg Oral Daily    famotidine  20 mg Oral BID    apixaban  5 mg Oral BID      dextrose           I/O last 3 completed shifts:   In: 2117.6 [P.O.:360; I.V.:1661.7; IV Piggyback:95.9]  Out: 5300 [Urine:5300]    CBC:   Recent Labs     10/08/22  0408 10/09/22  0603   WBC 2.5* 2.8*   HGB 12.5 12.4*   * 101*          Recent Labs     10/08/22  2339 10/09/22  0603 10/09/22  1033   * 131* 130*   K 3.8 3.9 3.8   CL 94* 97* 94*   CO2 26 29 24   BUN 5* 5* 5*   CREATININE 0.7 0.6 0.7   GLUCOSE 92 90 95       Lab Results   Component Value Date    CALCIUM 7.8 (L) 10/09/2022    PHOS 2.7 10/09/2022       Objective:     Vitals: /67   Pulse 63   Temp 98.6 °F (37 °C) (Oral)   Resp 11   Ht 5' 4.5\" (1.638 m)   Wt 142 lb 6.4 oz (64.6 kg)   SpO2 95%   BMI 24.07 kg/m² ,    General appearance: Thin, alert, awake and oriented  HEENT: No gross conjunctival pallor or scleral icterus  Neck: Supple  Lungs: Bronchial breath sound but no crackles  Heart: Irregular  Abdomen: Soft nontender  Extremities: No edema      Problem List :         Impression :     Hyponatremia-mainly from hypovolemia combined with nausea and vomiting-rise of sodium was strictly regulated-acceptable range now  COVID-19-minimal involvement  Acute bladder obstruction and bilateral hydro-without Nixon now we will watch clinically  Underlying atrial fibrillation thyroid disease  She had only 6 WBC in the urine-also no specific symptoms-unlikely acute cystitis    Recommendation/Plan  :     May discharge from kidney standpoint  Healthy high-protein diet  Outpatient CM P, magnesium and phosphorus in 1 week  Follow-up with me in 2 weeks      Zack Trinh MD MD

## 2022-10-11 ENCOUNTER — CARE COORDINATION (OUTPATIENT)
Dept: CASE MANAGEMENT | Age: 82
End: 2022-10-11

## 2022-10-11 DIAGNOSIS — U07.1 COVID: Primary | ICD-10-CM

## 2022-10-11 PROCEDURE — 1111F DSCHRG MED/CURRENT MED MERGE: CPT | Performed by: INTERNAL MEDICINE

## 2022-10-11 NOTE — CARE COORDINATION
[facility] Care Transitions Initial Follow Up Call    Call within 2 business days of discharge: Yes    Care Transition Nurse contacted  Jennifer Corona (Patient can be heard answering questions in background)  by telephone to perform post hospital discharge assessment. Verified name and  with  Dtr  as identifiers. Provided introduction to self, and explanation of the Care Transition Nurse role. Patient: Laz Aragon Patient : 1940   MRN: [id number]  Reason for Admission: Covid19, UTI, Hyponatremia, Sepsis  Discharge Date: 10/10/22 RARS: Readmission Risk Score: 10.4    Last Discharge  Street       Date Complaint Diagnosis Description Type Department Provider    10/6/22 Emesis; Nausea; Fatigue Hyponatremia . .. ED to Hosp-Admission (Discharged) (ADMITTED) Nico Esteves MD; Ashok Cárdenas... Was this an external facility discharge? No Discharge Facility: [facility]    Challenges to be reviewed by the provider   Additional needs identified to be addressed with provider:   10/6/22-10/10/22 [facility] for Escalante Mendoza, UTI, Hyponatremia, Sepsis    Patient did not  Cipro upon discharge citing not needed as she received iv atb while inpt. Per d/c note:  She will complete outpatient therapy with ciprofloxacin for UTI. Please further advise Patient. Method of communication with provider:  message left Dr Bharat Harkins MA line requesting call to Patient for further advisement re: Cipro. Tal Peguero Phone Assessment: Reports Patient doing \"much better\" following hospitalization and treatment for Covid19, UTI, Hyponatremia, Sepsis. Denies cough, fever, chills, sob, malaise, n/v/d or other Covid19 related sx. Reports appetite, fluid intake good. + BM. Voiding qs. Denies urinary complaints. Reports  did not  Cipro upon discharge as felt not needed as she \"received shots\" while inpt.  Stressed importance of continued atb therapy as per discharging MD recommendation. CTN to follow up. Has not yet been contacted by [facility]--CTN to follow up. Advised of 24/7 RN coverage. Care Transition Nurse reviewed UTI/Covid19  discharge instructions, medical action plan, and red flags with  Dtr  who verbalized understanding. Dtr was given an opportunity to ask questions and does not have any further questions or concerns at this time. Were discharge instructions available to patient? Yes. Reviewed appropriate site of care based on symptoms and resources available to patient including:   PCP  Benefits related nurse triage line  [facility]  [facility] . Dtr agrees to contact PCP office for questions related to Patient's healthcare. Medication reconciliation was performed, verbalizes understanding of administration of home medications.  Medications reviewed, 1111F entered: yes    Was patient discharged with a pulse oximeter? no    Non-face-to-face services provided:  Obtained and reviewed discharge summary and/or continuity of care documents  [id number], UTI  education    Offered patient enrollment in the Remote Patient Monitoring (RPM) program for in-home monitoring: NA.    Care Transitions 24 Hour Call    Schedule Follow Up Appointment with PCP: Irene Siddiqui you have a copy of your discharge instructions?: Yes  Do you have all of your prescriptions and are they filled?: No  Have you been contacted by a [facility] Pharmacist?: No  Have you scheduled your follow up appointment?: Yes  How are you going to get to your appointment?: Car - family or friend to transport  Do you have support at home?: Partner/Spouse/SO  Do you feel like you have everything you need to keep you well at home?: Yes  Are you an active caregiver in your home?: No  Care Transitions Interventions       Future Appointments   Date Time Provider Spike Bill   10/18/2022 11:15 AM Susan Holley MD [address] [facility] [address] IM [facility]   2/21/2023  1:00 PM Luciana James MD [facility]   3/7/2023 10:15 AM Susan Holley MD [address] [facility] [address] [place]     Advance Care Planning:   Does patient have an Advance Directive: reviewed and current. Care Transition Nurse provided contact information. Plan for follow-up call in 1-2 days based on severity of symptoms and risk factors. Plan for next call:  f/u on Cipro--confirm obtained/taking if advised by PCP , confirm [facility] [address] visit, UTI education    T/C [facility]--notified agency of discharge.      Kaitlynn Matthews RN

## 2022-10-12 ENCOUNTER — CARE COORDINATION (OUTPATIENT)
Dept: CASE MANAGEMENT | Age: 82
End: 2022-10-12

## 2022-10-12 DIAGNOSIS — I48.0 PAROXYSMAL ATRIAL FIBRILLATION (HCC): ICD-10-CM

## 2022-10-12 DIAGNOSIS — E78.2 MIXED HYPERLIPIDEMIA: Primary | ICD-10-CM

## 2022-10-12 DIAGNOSIS — E86.0 DEHYDRATION: ICD-10-CM

## 2022-10-12 DIAGNOSIS — E78.2 HYPERLIPEMIA, MIXED: ICD-10-CM

## 2022-10-12 NOTE — CARE COORDINATION
[facility] Care Transitions Follow Up Call    Care Transition Nurse contacted Dtr by telephone to follow up after admission on 10/6/22-10/10/22 at [facility]. Verified name and  with  Dtr  as identifiers. Patient: Moy Lugo  Patient : 1940   MRN: [id number]  Reason for Admission:  Covid19, UTI, Hyponatremia, Sepsis  Discharge Date: 10/10/22 RARS: Readmission Risk Score: 10.4    Needs to be reviewed by the provider   Additional needs identified to be addressed with provider:   Patient did not  Cipro upon discharge as felt not needed as received iv atb while inpt  No bm since 10/8/22  Nausea w/ scant phlegm like emesis today after shower  Dtr wanting to recheck sodium level       Method of communication with provider:  call to office-- see below . Phone Assessment: Dtr reports Patient was not contacted by PCP office yesterday re: Cipro as per CTN request. Reviewed 10/6/22 discharge note-- 'She will complete outpatient therapy with ciprofloxacin for UTI'. Stressed importance of atb tx asap. Dtr to p/u rx at [place] [address] today--given contact number. Reports Patient's last bm 10/8/22. Denies abd bloating, distention, pain, fever, ac distress . Patient did experience episode of nausea today after shower w/ scant phlegm like emesis. Dtr concerned if repeat labs should be done to check sodium level as with hyponatremia while inpt. CTN to follow up w/ PCP. Advised starting Cipro today, pacing activity, increased water intake, encouraged Gatorade, bland diet, ambulation and notifying MD asap should sx not resolve or worsen. Transportation confirmed for upcoming PCP appt:   Future Appointments   Date Time Provider Spike Bill   10/18/2022 11:15 AM MD Seven Estevez [facility]   2023  1:00 PM Mell Santiago MD [facility] Heart [facility]   3/7/2023 10:15 AM MD Seven Estevez [facility]     Care Transition Nurse reviewed medical action plan and red flags with  Dtr  and discussed any barriers to care and/or understanding of plan of care after discharge. Discussed appropriate site of care based on symptoms and resources available to patient including: PCP  Urgent care clinics  [address] [place] [address] . Patients top risk factors for readmission: medical condition-ongoing sx as above  Interventions to address risk factors:  see dennis    Offered patient enrollment in the Remote Patient Monitoring (RPM) program for in-home monitoring: NA.     Care Transitions Subsequent and Final Call    Schedule Follow Up Appointment with PCP: Declined  Subsequent and Final Calls  Do you have any ongoing symptoms?: Yes  Onset of Patient-reported symptoms: In the past 7 days  Patient-reported symptoms: Other  Interventions for patient-reported symptoms: Notified PCP/Physician  Have your medications changed?: No  Do you have any questions related to your medications?: No  Do you currently have any active services?: No  Do you have any needs or concerns that I can assist you with?: Yes  Patient-reported Needs or Concerns: see note  Identified Barriers: Other  Care Transitions Interventions  Other Interventions:             Care Transition Nurse provided contact information for future needs. Plan for follow-up call in 1-2 days based on severity of symptoms and risk factors. Plan for next call:  confirm call to Patient from PCP office, taking cipro?, sx, bm?     T/C [facility]- Dr Brea Johnson. Discussed above Patient concerns, requested call to Patient for further advisement asap as 2nd call. [place] to call Patient.    Jurgen Lujan RN

## 2022-10-13 ENCOUNTER — HOSPITAL ENCOUNTER (OUTPATIENT)
Age: 82
Setting detail: SPECIMEN
Discharge: HOME | End: 2022-10-13
Payer: MEDICARE

## 2022-10-13 ENCOUNTER — TELEPHONE (OUTPATIENT)
Dept: INTERNAL MEDICINE CLINIC | Age: 82
End: 2022-10-13

## 2022-10-13 ENCOUNTER — CARE COORDINATION (OUTPATIENT)
Dept: CASE MANAGEMENT | Age: 82
End: 2022-10-13

## 2022-10-13 DIAGNOSIS — R10.13 DYSPEPSIA: Primary | ICD-10-CM

## 2022-10-13 LAB
ALBUMIN SERPL-MCNC: 4 GM/DL (ref 3.4–5)
ALP BLD-CCNC: 89 IU/L (ref 40–128)
ALT SERPL-CCNC: 15 U/L (ref 10–40)
ANION GAP SERPL CALCULATED.3IONS-SCNC: 10 MMOL/L (ref 4–16)
AST SERPL-CCNC: 17 IU/L (ref 15–37)
BASOPHILS ABSOLUTE: 0 K/CU MM
BASOPHILS RELATIVE PERCENT: 0.2 % (ref 0–1)
BILIRUB SERPL-MCNC: 0.9 MG/DL (ref 0–1)
BUN BLDV-MCNC: 7 MG/DL (ref 6–23)
CALCIUM SERPL-MCNC: 8.7 MG/DL (ref 8.3–10.6)
CHLORIDE BLD-SCNC: 88 MMOL/L (ref 99–110)
CO2: 24 MMOL/L (ref 21–32)
CREAT SERPL-MCNC: 0.5 MG/DL (ref 0.6–1.1)
CULTURE: NORMAL
CULTURE: NORMAL
DIFFERENTIAL TYPE: ABNORMAL
EOSINOPHILS ABSOLUTE: 0 K/CU MM
EOSINOPHILS RELATIVE PERCENT: 0 % (ref 0–3)
GFR AFRICAN AMERICAN: >60 ML/MIN/1.73M2
GFR NON-AFRICAN AMERICAN: >60 ML/MIN/1.73M2
GLUCOSE BLD-MCNC: 99 MG/DL (ref 70–99)
HCT VFR BLD CALC: 37.6 % (ref 37–47)
HEMOGLOBIN: 13 GM/DL (ref 12.5–16)
IMMATURE NEUTROPHIL %: 1.5 % (ref 0–0.43)
LYMPHOCYTES ABSOLUTE: 1.2 K/CU MM
LYMPHOCYTES RELATIVE PERCENT: 29.2 % (ref 24–44)
Lab: NORMAL
Lab: NORMAL
MCH RBC QN AUTO: 30.6 PG (ref 27–31)
MCHC RBC AUTO-ENTMCNC: 34.6 % (ref 32–36)
MCV RBC AUTO: 88.5 FL (ref 78–100)
MONOCYTES ABSOLUTE: 0.6 K/CU MM
MONOCYTES RELATIVE PERCENT: 14.1 % (ref 0–4)
NUCLEATED RBC %: 0 %
PDW BLD-RTO: 11.7 % (ref 11.7–14.9)
PLATELET # BLD: 203 K/CU MM (ref 140–440)
PMV BLD AUTO: 9.2 FL (ref 7.5–11.1)
POTASSIUM SERPL-SCNC: 4.6 MMOL/L (ref 3.5–5.1)
RBC # BLD: 4.25 M/CU MM (ref 4.2–5.4)
SEGMENTED NEUTROPHILS ABSOLUTE COUNT: 2.3 K/CU MM
SEGMENTED NEUTROPHILS RELATIVE PERCENT: 55 % (ref 36–66)
SODIUM BLD-SCNC: 122 MMOL/L (ref 135–145)
SPECIMEN: NORMAL
SPECIMEN: NORMAL
TOTAL IMMATURE NEUTOROPHIL: 0.06 K/CU MM
TOTAL NUCLEATED RBC: 0 K/CU MM
TOTAL PROTEIN: 6.3 GM/DL (ref 6.4–8.2)
WBC # BLD: 4.1 K/CU MM (ref 4–10.5)

## 2022-10-13 PROCEDURE — 85025 COMPLETE CBC W/AUTO DIFF WBC: CPT

## 2022-10-13 PROCEDURE — 80053 COMPREHEN METABOLIC PANEL: CPT

## 2022-10-13 RX ORDER — FAMOTIDINE 40 MG/1
40 TABLET, FILM COATED ORAL NIGHTLY PRN
Qty: 30 TABLET | Refills: 1 | Status: SHIPPED | OUTPATIENT
Start: 2022-10-13

## 2022-10-13 NOTE — CARE COORDINATION
[facility] Care Transitions Follow Up Call    Care Transition Nurse contacted Estrellita Melgar- Son by telephone to follow up after admission on 10/6/22-10/10/22 at [facility]. Verified name and   as identifiers. Patient: Batsheva Ivey  Patient : 1940   MRN: [id number]  Reason for Admission:  Covid19, UTI, Hyponatremia, Sepsis  Discharge Date: 10/10/22 RARS: Readmission Risk Score: 10.4    Needs to be reviewed by the provider   Additional needs identified to be addressed with provider: none     Method of communication with provider: none. Phone Assessment: Son provided the following information, Patient can be heard in background answering questions. Patient had labs drawn today w/ Na122, BUN 7, CR 0.5 and was seen urgently by Dr Kimberly Fatima- Nephrology. Patient scheduled to have ivf tomorrow at infusion clinic, advised increased protein and salty foods/drinks and rx for Nystatin for probable oral thrush. Stressed importance of ivf infusion tomorrow, strongly encouraged increased protein, salty foods and salty drinks such as Gatorade until otherwise directed by MD once infusion completed and recheck of sodium level. Son verbalizes understanding. Awaiting call from pharmacy to p/u Nystatin; med education provided. Patient voiding qs, had bm today. Denies n/v/d, muscle spasms, dizziness. Patient is experiencing generalized weakness. Has 24/7 supervision and assistance. Obtained Cipro but not taking as felt not needed--advised to further discuss w/ PCP. Denies urinary complaints, fever, chills, pain. Active w/ [facility] [address], denies need for additional in home support.      Confirmed transportation for the following upcoming appts:   Future Appointments   Date Time Provider Spike Bill   10/14/2022 12:00 PM INFUSION ROOM 1 - [facility]   10/18/2022 11:15 AM Neida Tran MD [facility] E S IM [facility]   2023  1:00 PM Reilly Munoz MD [facility]   3/7/2023 10:15 AM Neida Tran MD [facility] Evertt Gino [facility]     Care Transition Nurse reviewed medical action plan and red flags with  Son  and discussed any barriers to care and/or understanding of plan of care after discharge. Discussed appropriate site of care based on symptoms and resources available to patient including: PCP  Specialist  Urgent care clinics  [facility] . Agrees to contact the PCP office for questions related to Patient healthcare. Patients top risk factors for readmission: medical condition-recurrent hyponatremia , covid 19, uti, thrush  Interventions to address risk factors: Education of patient/family/caregiver/guardian to support self-management-see above    Offered patient enrollment in the Remote Patient Monitoring (RPM) program for in-home monitoring: NA.     Care Transitions Subsequent and Final Call    Schedule Follow Up Appointment with PCP: Declined  Subsequent and Final Calls  Do you have any ongoing symptoms?: Yes  Onset of Patient-reported symptoms: Other  Patient-reported symptoms: Nausea, Weakness  Interventions for patient-reported symptoms: Other  Have your medications changed?: Yes  Patient Reports: 10/13/22 + Nystatin, Pepcid; 10/14/22 IVF at infusion clinic  Do you have any questions related to your medications?: No  Do you currently have any active services?: Yes  Are you currently active with any services?: [address] [place] you have any needs or concerns that I can assist you with?: No  Identified Barriers: Other  Care Transitions Interventions  Other Interventions:             Care Transition Nurse provided contact information for future needs. Plan for follow-up call in 3-5 days based on severity of symptoms and risk factors.   Plan for next call:  sx, confirm ivf received,  f/u on na level , pcp appt reminder    Lilian Angelucci, RN

## 2022-10-13 NOTE — TELEPHONE ENCOUNTER
Patients daughter calls- wanted to know if Luis Carlos Thomas could have a prescription for Pepcid to help settle her stomach. She wants to be able to eat more but her stomach is upset. She states its not nausea. When she was in the hospital she ate more and she was given Pepcid. Please advise.

## 2022-10-14 ENCOUNTER — HOSPITAL ENCOUNTER (OUTPATIENT)
Dept: INFUSION THERAPY | Age: 82
Setting detail: INFUSION SERIES
Discharge: HOME | End: 2022-10-14
Payer: MEDICARE

## 2022-10-14 VITALS
OXYGEN SATURATION: 97 % | DIASTOLIC BLOOD PRESSURE: 77 MMHG | RESPIRATION RATE: 16 BRPM | HEART RATE: 68 BPM | TEMPERATURE: 97.3 F | SYSTOLIC BLOOD PRESSURE: 152 MMHG

## 2022-10-14 DIAGNOSIS — E86.0 DEHYDRATION: Primary | ICD-10-CM

## 2022-10-14 DIAGNOSIS — F41.9 ANXIETY: Primary | ICD-10-CM

## 2022-10-14 LAB
ALBUMIN SERPL-MCNC: 4.1 GM/DL (ref 3.4–5)
ALP BLD-CCNC: 84 IU/L (ref 40–128)
ALT SERPL-CCNC: 14 U/L (ref 10–40)
ANION GAP SERPL CALCULATED.3IONS-SCNC: 9 MMOL/L (ref 4–16)
AST SERPL-CCNC: 17 IU/L (ref 15–37)
BACTERIA: ABNORMAL /HPF
BILIRUB SERPL-MCNC: 0.9 MG/DL (ref 0–1)
BILIRUBIN URINE: NEGATIVE MG/DL
BLOOD, URINE: NEGATIVE
BUN BLDV-MCNC: 7 MG/DL (ref 6–23)
CALCIUM SERPL-MCNC: 8.8 MG/DL (ref 8.3–10.6)
CHLORIDE BLD-SCNC: 88 MMOL/L (ref 99–110)
CLARITY: CLEAR
CO2: 24 MMOL/L (ref 21–32)
COLOR: YELLOW
CREAT SERPL-MCNC: 0.6 MG/DL (ref 0.6–1.1)
GFR AFRICAN AMERICAN: >60 ML/MIN/1.73M2
GFR NON-AFRICAN AMERICAN: >60 ML/MIN/1.73M2
GLUCOSE BLD-MCNC: 101 MG/DL (ref 70–99)
GLUCOSE, URINE: NEGATIVE MG/DL
KETONES, URINE: NEGATIVE MG/DL
LEUKOCYTE ESTERASE, URINE: ABNORMAL
MUCUS: ABNORMAL HPF
NITRITE URINE, QUANTITATIVE: NEGATIVE
OSMOLALITY URINE: 238 MOS/L (ref 292–1090)
PH, URINE: 7.5 (ref 5–8)
POTASSIUM SERPL-SCNC: 4.7 MMOL/L (ref 3.5–5.1)
PROTEIN UA: NEGATIVE MG/DL
RBC URINE: ABNORMAL /HPF (ref 0–6)
SODIUM BLD-SCNC: 121 MMOL/L (ref 135–145)
SODIUM URINE: 66 MMOL/L (ref 35–167)
SPECIFIC GRAVITY UA: 1.01 (ref 1–1.03)
SQUAMOUS EPITHELIAL: 9 /HPF
TOTAL PROTEIN: 6.5 GM/DL (ref 6.4–8.2)
TRICHOMONAS: ABNORMAL /HPF
UROBILINOGEN, URINE: 1 MG/DL (ref 0.2–1)
WBC UA: 2 /HPF (ref 0–5)

## 2022-10-14 PROCEDURE — 84300 ASSAY OF URINE SODIUM: CPT

## 2022-10-14 PROCEDURE — 81001 URINALYSIS AUTO W/SCOPE: CPT

## 2022-10-14 PROCEDURE — 2580000003 HC RX 258: Performed by: INTERNAL MEDICINE

## 2022-10-14 PROCEDURE — 80053 COMPREHEN METABOLIC PANEL: CPT

## 2022-10-14 PROCEDURE — 99211 OFF/OP EST MAY X REQ PHY/QHP: CPT

## 2022-10-14 PROCEDURE — 96361 HYDRATE IV INFUSION ADD-ON: CPT

## 2022-10-14 PROCEDURE — 83935 ASSAY OF URINE OSMOLALITY: CPT

## 2022-10-14 PROCEDURE — 96360 HYDRATION IV INFUSION INIT: CPT

## 2022-10-14 RX ORDER — SODIUM CHLORIDE 0.9 % (FLUSH) 0.9 %
10 SYRINGE (ML) INJECTION PRN
Status: DISCONTINUED | OUTPATIENT
Start: 2022-10-14 | End: 2022-10-15 | Stop reason: HOSPADM

## 2022-10-14 RX ORDER — SODIUM CHLORIDE 9 MG/ML
INJECTION, SOLUTION INTRAVENOUS CONTINUOUS
Status: CANCELLED
Start: 2022-10-14

## 2022-10-14 RX ORDER — ALPRAZOLAM 0.25 MG/1
0.25 TABLET ORAL 2 TIMES DAILY PRN
Qty: 14 TABLET | Refills: 0 | Status: SHIPPED | OUTPATIENT
Start: 2022-10-14 | End: 2022-10-21

## 2022-10-14 RX ORDER — SODIUM CHLORIDE 9 MG/ML
INJECTION, SOLUTION INTRAVENOUS CONTINUOUS
Status: ACTIVE | OUTPATIENT
Start: 2022-10-14 | End: 2022-10-14

## 2022-10-14 RX ADMIN — SODIUM CHLORIDE, PRESERVATIVE FREE 10 ML: 5 INJECTION INTRAVENOUS at 11:20

## 2022-10-14 RX ADMIN — SODIUM CHLORIDE: 9 INJECTION, SOLUTION INTRAVENOUS at 11:23

## 2022-10-14 NOTE — PROGRESS NOTES
Juliana Feldman requires a 3 in 1 bedside commode due to being unable to use the toilet within the home  and   confined to a single room   confined to one level of the home.

## 2022-10-14 NOTE — DISCHARGE INSTRUCTIONS
DISCHARGE INSTRUCTIONS:    Other Instructions  Continue home meds, diet and activity  Call your Doctor for any specific questions or problems  If you have any problems and are unable to contact your doctor, go to the Emergency Room. Thank you for choosing [facility]. It is a pleasure to serve you.         Infusion Unit  [phone]  8AM-5PM

## 2022-10-14 NOTE — PROGRESS NOTES
Tolerated IV Hydration well. Reviewed discharge instruction, voiced understanding. Copies of AVS given. Pt discharged home. Pt to exit via [facility] per  and son at side.     Orders Placed This Encounter   Medications    0.9 % sodium chloride infusion    sodium chloride flush 0.9 % injection 10 mL

## 2022-10-15 ENCOUNTER — HOSPITAL ENCOUNTER (OUTPATIENT)
Age: 82
Setting detail: SPECIMEN
Discharge: HOME | End: 2022-10-15
Payer: MEDICARE

## 2022-10-15 PROCEDURE — 80053 COMPREHEN METABOLIC PANEL: CPT

## 2022-10-17 LAB
ALBUMIN SERPL-MCNC: 4 GM/DL (ref 3.4–5)
ALP BLD-CCNC: 82 IU/L (ref 40–128)
ALT SERPL-CCNC: 16 U/L (ref 10–40)
ANION GAP SERPL CALCULATED.3IONS-SCNC: 9 MMOL/L (ref 4–16)
AST SERPL-CCNC: 19 IU/L (ref 15–37)
BILIRUB SERPL-MCNC: 0.6 MG/DL (ref 0–1)
BUN BLDV-MCNC: 13 MG/DL (ref 6–23)
CALCIUM SERPL-MCNC: 8.8 MG/DL (ref 8.3–10.6)
CHLORIDE BLD-SCNC: 94 MMOL/L (ref 99–110)
CO2: 25 MMOL/L (ref 21–32)
CREAT SERPL-MCNC: 0.7 MG/DL (ref 0.6–1.1)
GFR AFRICAN AMERICAN: >60 ML/MIN/1.73M2
GFR NON-AFRICAN AMERICAN: >60 ML/MIN/1.73M2
GLUCOSE BLD-MCNC: 85 MG/DL (ref 70–99)
POTASSIUM SERPL-SCNC: 4.5 MMOL/L (ref 3.5–5.1)
SODIUM BLD-SCNC: 128 MMOL/L (ref 135–145)
TOTAL PROTEIN: 6.6 GM/DL (ref 6.4–8.2)

## 2022-10-18 ENCOUNTER — HOSPITAL ENCOUNTER (OUTPATIENT)
Dept: INFUSION THERAPY | Age: 82
Setting detail: INFUSION SERIES
Discharge: HOME | End: 2022-10-18
Payer: MEDICARE

## 2022-10-18 ENCOUNTER — CARE COORDINATION (OUTPATIENT)
Dept: CASE MANAGEMENT | Age: 82
End: 2022-10-18

## 2022-10-18 VITALS
TEMPERATURE: 97.3 F | RESPIRATION RATE: 16 BRPM | SYSTOLIC BLOOD PRESSURE: 135 MMHG | DIASTOLIC BLOOD PRESSURE: 72 MMHG | HEART RATE: 78 BPM | OXYGEN SATURATION: 99 %

## 2022-10-18 DIAGNOSIS — E86.0 DEHYDRATION: ICD-10-CM

## 2022-10-18 DIAGNOSIS — E86.0 DEHYDRATION: Primary | ICD-10-CM

## 2022-10-18 DIAGNOSIS — E87.1 HYPONATREMIA: ICD-10-CM

## 2022-10-18 LAB
ALBUMIN SERPL-MCNC: 4.1 GM/DL (ref 3.4–5)
ALP BLD-CCNC: 78 IU/L (ref 40–129)
ALT SERPL-CCNC: 14 U/L (ref 10–40)
ANION GAP SERPL CALCULATED.3IONS-SCNC: 8 MMOL/L (ref 4–16)
AST SERPL-CCNC: 16 IU/L (ref 15–37)
BILIRUB SERPL-MCNC: 0.8 MG/DL (ref 0–1)
BUN BLDV-MCNC: 12 MG/DL (ref 6–23)
CALCIUM SERPL-MCNC: 8.9 MG/DL (ref 8.3–10.6)
CHLORIDE BLD-SCNC: 102 MMOL/L (ref 99–110)
CO2: 25 MMOL/L (ref 21–32)
CREAT SERPL-MCNC: 0.8 MG/DL (ref 0.6–1.1)
GFR SERPL CREATININE-BSD FRML MDRD: >60 ML/MIN/1.73M2
GLUCOSE BLD-MCNC: 90 MG/DL (ref 70–99)
POTASSIUM SERPL-SCNC: 4.7 MMOL/L (ref 3.5–5.1)
SODIUM BLD-SCNC: 135 MMOL/L (ref 135–145)
TOTAL PROTEIN: 6.5 GM/DL (ref 6.4–8.2)

## 2022-10-18 PROCEDURE — 99211 OFF/OP EST MAY X REQ PHY/QHP: CPT

## 2022-10-18 PROCEDURE — 80053 COMPREHEN METABOLIC PANEL: CPT

## 2022-10-18 PROCEDURE — 96360 HYDRATION IV INFUSION INIT: CPT

## 2022-10-18 PROCEDURE — 96361 HYDRATE IV INFUSION ADD-ON: CPT

## 2022-10-18 PROCEDURE — 2580000003 HC RX 258: Performed by: INTERNAL MEDICINE

## 2022-10-18 RX ORDER — SODIUM CHLORIDE 0.9 % (FLUSH) 0.9 %
5-40 SYRINGE (ML) INJECTION PRN
OUTPATIENT
Start: 2022-10-18

## 2022-10-18 RX ORDER — 0.9 % SODIUM CHLORIDE 0.9 %
1000 INTRAVENOUS SOLUTION INTRAVENOUS ONCE
Status: CANCELLED | OUTPATIENT
Start: 2022-10-19 | End: 2022-10-19

## 2022-10-18 RX ORDER — SODIUM CHLORIDE 0.9 % (FLUSH) 0.9 %
5-40 SYRINGE (ML) INJECTION PRN
Status: CANCELLED | OUTPATIENT
Start: 2022-10-19

## 2022-10-18 RX ORDER — SODIUM CHLORIDE 0.9 % (FLUSH) 0.9 %
5-40 SYRINGE (ML) INJECTION PRN
Status: DISCONTINUED | OUTPATIENT
Start: 2022-10-18 | End: 2022-10-19 | Stop reason: HOSPADM

## 2022-10-18 RX ORDER — 0.9 % SODIUM CHLORIDE 0.9 %
1000 INTRAVENOUS SOLUTION INTRAVENOUS ONCE
Status: CANCELLED | OUTPATIENT
Start: 2022-10-18 | End: 2022-10-18

## 2022-10-18 RX ORDER — SODIUM CHLORIDE 0.9 % (FLUSH) 0.9 %
5-40 SYRINGE (ML) INJECTION PRN
Status: CANCELLED | OUTPATIENT
Start: 2022-10-18

## 2022-10-18 RX ORDER — 0.9 % SODIUM CHLORIDE 0.9 %
1000 INTRAVENOUS SOLUTION INTRAVENOUS ONCE
Status: COMPLETED | OUTPATIENT
Start: 2022-10-18 | End: 2022-10-18

## 2022-10-18 RX ADMIN — SODIUM CHLORIDE 1000 ML: 9 INJECTION, SOLUTION INTRAVENOUS at 09:57

## 2022-10-18 RX ADMIN — SODIUM CHLORIDE, PRESERVATIVE FREE 10 ML: 5 INJECTION INTRAVENOUS at 09:55

## 2022-10-18 NOTE — DISCHARGE INSTRUCTIONS
Hydration Therapy  Make sure you come as directed by your doctor. Do not miss a dose. Other Instructions  Continue home meds, diet and activity  Call your Doctor for any specific questions or problems  If you have any problems and are unable to contact your doctor, go to the Emergency Room. Thank you for choosing [facility]. It is a pleasure to serve you.         Infusion Unit  [phone]  8AM-5PM

## 2022-10-18 NOTE — CARE COORDINATION
[facility] Care Transitions Follow Up Call    Care Transition Nurse contacted the patient by telephone to follow up after admission on 10/6/22-10/10/22 at [facility]. Verified name and  with patient as identifiers. Patient: Batsheva Ivey  Patient : 1940   MRN: [id number]  Reason for Admission: Covid19, UTI, Hyponatremia, Sepsis  Discharge Date: 10/10/22 RARS: Readmission Risk Score: 10.4    Needs to be reviewed by the provider   Additional needs identified to be addressed with provider: No  no     Method of communication with provider: none. Phone Assessment: Patient reports doing \"so much better! \" After receiving iv hydration x 2. Na 135 today, up from 128 on 10/15/22 and 121 on 10/14/22. Reports feeling stronger, less fatigued. Patient drinking Pedialyte, consuming salty foods as per MD recommendation (until further advised). Denies edema. Encouraged to monitor bp. Patient to schedule follow up appts w/ PCP and Dr Kimberly Fatima; politely declines CTN offer to do so. Reports loose stool which she attributes to eating beef&barley soup x 3 days. Denies n/v/d. Discussed interventions. Reports voiding qs; denies urinary complaints. Recovering from [id number]--sj acute sx voiced. Noted to be speaking in complete unlabored sentences. Reports thrush improving, completing Nystatin rx. Active w/ [facility]. Has been seen by SN, LSW, OT and plan for PT visit. Reports LSW, OT signed off as services not needed. To obtain bsc. No other resource, rx needs voiced. Future Appointments   Date Time Provider Spike Bill   2023  1:00 PM Reilly Munoz MD [facility]   3/7/2023 10:15 AM Neida Tran MD [address] [facility] [address] [facility]     Care Transition Nurse reviewed medical action plan and red flags with patient and discussed any barriers to care and/or understanding of plan of care after discharge. Discussed appropriate site of care based on symptoms and resources available to patient including: PCP  Specialist  [facility]  [facility] . The patient agrees to contact the PCP office for questions related to their healthcare. Patients top risk factors for readmission: medical condition-Covid19, Hyponatremia, UTI, Thrush  Interventions to address risk factors: Education of patient/family/caregiver/guardian to support self-management-. Offered patient enrollment in the Remote Patient Monitoring (RPM) program for in-home monitoring: NA.     Care Transitions Subsequent and Final Call    Schedule Follow Up Appointment with PCP: Declined  Subsequent and Final Calls  Do you have any ongoing symptoms?: Yes  Onset of Patient-reported symptoms: Other  Patient-reported symptoms: Other  Interventions for patient-reported symptoms: Other  Have your medications changed?: No  Do you have any questions related to your medications?: No  Do you currently have any active services?: Yes  Are you currently active with any services?: Home Health  Do you have any needs or concerns that I can assist you with?: No  Identified Barriers: Other  Care Transitions Interventions  Other Interventions:             Care Transition Nurse provided contact information for future needs. Plan for f/u call in 2-3 days based on severity of symptoms and risk factors. Plan for next call: symptom management-weakness, diarrhea?  follow-up appointment-confirm f/u appts scheduled w/ pcp/neph, when is next lab draw to check na?     Jamal Shook RN

## 2022-10-24 ENCOUNTER — CARE COORDINATION (OUTPATIENT)
Dept: CASE MANAGEMENT | Age: 82
End: 2022-10-24

## 2022-10-24 NOTE — CARE COORDINATION
Care Transitions Outreach Attempt    Patient: Dereje Meier Patient : 1940 MRN: [id number]    Reason for Admission: Covid19, UTI, Hyponatremia, Sepsis  Discharge Date: 10/10/22     RARS: Readmission Risk Score: 10.4  Facility: 10/6/22-10/10/22 at [facility]. Last Discharge  Street       Date Complaint Diagnosis Description Type Department Provider    10/6/22 Emesis; Nausea; Fatigue Hyponatremia . .. ED to Hosp-Admission (Discharged) (ADMITTED) Laci Mcgrath MD; Ollie Ramsay. .. Noted following upcoming appointments from discharge chart review:   [facility] follow up appointment(s):   Future Appointments   Date Time Provider Spike Bill   2023  1:00 PM Mau Davalos MD [facility]   3/7/2023 10:15 AM Brain Sampson MD [facility]     Attempt to reach for [place] Monitoring follow up call unsuccessful.     [address] [place] [address] [phone]'

## 2022-10-25 ENCOUNTER — HOSPITAL ENCOUNTER (OUTPATIENT)
Age: 82
Setting detail: SPECIMEN
Discharge: HOME | End: 2022-10-25
Payer: MEDICARE

## 2022-10-25 LAB
ALBUMIN SERPL-MCNC: 4.1 GM/DL (ref 3.4–5)
ALP BLD-CCNC: 75 IU/L (ref 40–129)
ALT SERPL-CCNC: 9 U/L (ref 10–40)
ANION GAP SERPL CALCULATED.3IONS-SCNC: 12 MMOL/L (ref 4–16)
AST SERPL-CCNC: 13 IU/L (ref 15–37)
BILIRUB SERPL-MCNC: 0.8 MG/DL (ref 0–1)
BUN BLDV-MCNC: 15 MG/DL (ref 6–23)
CALCIUM SERPL-MCNC: 8.9 MG/DL (ref 8.3–10.6)
CHLORIDE BLD-SCNC: 105 MMOL/L (ref 99–110)
CO2: 25 MMOL/L (ref 21–32)
CREAT SERPL-MCNC: 0.8 MG/DL (ref 0.6–1.1)
GFR SERPL CREATININE-BSD FRML MDRD: >60 ML/MIN/1.73M2
GLUCOSE BLD-MCNC: 65 MG/DL (ref 70–99)
POTASSIUM SERPL-SCNC: 4 MMOL/L (ref 3.5–5.1)
SODIUM BLD-SCNC: 142 MMOL/L (ref 135–145)
TOTAL PROTEIN: 6.6 GM/DL (ref 6.4–8.2)

## 2022-10-25 PROCEDURE — 80053 COMPREHEN METABOLIC PANEL: CPT

## 2022-10-26 ENCOUNTER — CARE COORDINATION (OUTPATIENT)
Dept: CASE MANAGEMENT | Age: 82
End: 2022-10-26

## 2022-10-26 NOTE — CARE COORDINATION
[facility] Care Transitions Follow Up Call    Care Transition Nurse contacted the patient by telephone to follow up after admission on 10/6/22. Verified name and  with patient as identifiers. Patient: Zee Greene  Patient : 1940   MRN: [id number]  Reason for Admission: Covid19, UTI, Hyponatremia, Sepsis  Discharge Date: 10/10/22 RARS: Readmission Risk Score: 10.4      Needs to be reviewed by the provider   Additional needs identified to be addressed with provider: No  none             Method of communication with provider: none. Spoke with Angel Wong, very pleasant, said she is feeling well. Had PT this morning and gave her some things to do to increase her stamina. [facility] nurse will be drawing blood weekly for awhile, Na+ 142 yesterday. Denies any Covid symptoms. She did not f/u with PCP but has been in contact with him. Ariadna Huddlestonmichelle has resolved. Very appreciative of all the care and concern from all. Denies any needs. No other questions or concerns at this time. Will continue to follow. Addressed changes since last contact:  none  Discussed follow-up appointments. If no appointment was previously scheduled, appointment scheduling offered: Yes. Is follow up appointment scheduled within 7 days of discharge? Yes. Follow Up  Future Appointments   Date Time Provider Spike Bill   2023  1:00 PM Kristina Junior MD [facility]   3/7/2023 10:15 AM Remedios Snyder MD [facility]     [facility] follow up appointment(s): luis    Care Transition Nurse reviewed medical action plan and red flags with patient and discussed any barriers to care and/or understanding of plan of care after discharge. Discussed appropriate site of care based on symptoms and resources available to patient including: PCP  Specialist  Urgent care clinics  [place] [facility]  When to call [address] The patient agrees to contact the PCP office for questions related to their healthcare.      Advance Care Planning:   not on file.     Patients top risk factors for readmission: lack of knowledge about disease and medical condition-hyponatremia, Afib  Interventions to address risk factors: Education of patient/family/caregiver/guardian to support self-management  Will reach out to PCP with issues    Offered patient enrollment in the Remote Patient Monitoring (RPM) program for in-home monitoring: NA.     Care Transitions Subsequent and Final Call    Subsequent and Final Calls  Do you have any ongoing symptoms?: No  Have your medications changed?: No  Do you have any questions related to your medications?: No  Do you currently have any active services?: Yes  Are you currently active with any services?: Home Health  Do you have any needs or concerns that I can assist you with?: No  Identified Barriers: Lack of Education  Care Transitions Interventions   Home Care Waiver: Declined        Transportation Support: Declined    Meals on Wheels: Declined  DME Assistance: Declined     Senior Services: Declined    Other Interventions:             Care Transition Nurse provided contact information for future needs. Plan for follow-up call in 7-10 days based on severity of symptoms and risk factors. Plan for next call:  routine call, Na level?, Final call?     Irene Kraft RN

## 2022-11-01 ENCOUNTER — HOSPITAL ENCOUNTER (OUTPATIENT)
Age: 82
Setting detail: SPECIMEN
Discharge: HOME | End: 2022-11-01
Payer: MEDICARE

## 2022-11-01 LAB
ALBUMIN SERPL-MCNC: 3.8 GM/DL (ref 3.4–5)
ALP BLD-CCNC: 78 IU/L (ref 40–129)
ALT SERPL-CCNC: 10 U/L (ref 10–40)
ANION GAP SERPL CALCULATED.3IONS-SCNC: 9 MMOL/L (ref 4–16)
AST SERPL-CCNC: 17 IU/L (ref 15–37)
BILIRUB SERPL-MCNC: 0.5 MG/DL (ref 0–1)
BUN BLDV-MCNC: 18 MG/DL (ref 6–23)
CALCIUM SERPL-MCNC: 8.6 MG/DL (ref 8.3–10.6)
CHLORIDE BLD-SCNC: 105 MMOL/L (ref 99–110)
CO2: 26 MMOL/L (ref 21–32)
CREAT SERPL-MCNC: 0.7 MG/DL (ref 0.6–1.1)
GFR SERPL CREATININE-BSD FRML MDRD: >60 ML/MIN/1.73M2
GLUCOSE FASTING: 95 MG/DL (ref 70–99)
POTASSIUM SERPL-SCNC: 4.2 MMOL/L (ref 3.5–5.1)
SODIUM BLD-SCNC: 140 MMOL/L (ref 135–145)
TOTAL PROTEIN: 6.2 GM/DL (ref 6.4–8.2)

## 2022-11-01 PROCEDURE — 80053 COMPREHEN METABOLIC PANEL: CPT

## 2022-11-04 ENCOUNTER — CARE COORDINATION (OUTPATIENT)
Dept: CASE MANAGEMENT | Age: 82
End: 2022-11-04

## 2022-11-04 DIAGNOSIS — R10.13 DYSPEPSIA: ICD-10-CM

## 2022-11-04 RX ORDER — FAMOTIDINE 40 MG/1
40 TABLET, FILM COATED ORAL NIGHTLY PRN
Qty: 30 TABLET | Refills: 1 | OUTPATIENT
Start: 2022-11-04

## 2022-11-04 NOTE — CARE COORDINATION
[facility] Care Transitions Follow Up Call    Care Transition Nurse contacted the patient by telephone to follow up after admission on 10/6/22-10/10/22. Verified name and  with patient as identifiers. Patient: Katya Dubois  Patient : 1940   MRN: [id number]  Reason for Admission:  Covid19, UTI, Hyponatremia, Sepsis  Discharge Date: 10/10/22 RARS: Readmission Risk Score: 10.4  Facility: [facility]    Needs to be reviewed by the provider   Additional needs identified to be addressed with provider: none       Method of communication with provider: none. Phone Assessment: Patient reports doing much better since last call. Recovering well from Covid19--only w/ rare cough. Denies urinary c/o r/t recent UTI. Voiding qs, + bm. Reports thrush sx resolved. Strength improving. Denies dizziness, weakness. Still limiting diet to bland, soft foods ---beef bone broth,, pedialyte, scrambled eggs, cottage cheese, apple sauce etc. Has added Ensure supplement as well. Remains active w/ [facility] [address] with weekly lab draws. 10/15 Na 128  10/25 Na 142     Na 140    Discussed follow-up appointments. Patient has cancelled scheduled PCP appts x 2. Confirmed transportation for 22 PCP appt. Patient prefers to schedule appt w/ Dr Avila Duff importance of nephrology appt. Follow Up  Future Appointments   Date Time Provider Spike Bill   2022  2:00 PM MD [address] [facility]   2023  1:00 PM Corey Kwong MD [facility]   3/7/2023 10:15 AM MD [address] [facility]     Care Transition Nurse reviewed medical action plan and red flags with patient and discussed any barriers to care and/or understanding of plan of care after discharge. Discussed appropriate site of care based on symptoms and resources available to patient including: PCP  Specialist  Urgent care clinics  [address] . Agrees to contact the PCP office for questions related to healthcare.      Patients top risk factors for readmission: medical condition-Covid19, Hyponatremia, UTI, Thrush  Interventions to address risk factors: Education of patient/family/caregiver/guardian to support self-management-. Offered patient enrollment in the Remote Patient Monitoring (RPM) program for in-home monitoring: NA.     Care Transitions Subsequent and Final Call    Schedule Follow Up Appointment with PCP: Declined  Subsequent and Final Calls  Do you have any ongoing symptoms?: No  Have your medications changed?: No  Do you have any questions related to your medications?: No  Do you currently have any active services?: Yes  Are you currently active with any services?: Home Health  Do you have any needs or concerns that I can assist you with?: No  Identified Barriers: Other  Care Transitions Interventions   Home Care Waiver: Declined        Transportation Support: Declined    Meals on Wheels: Declined Registered Dietician: Completed DME Assistance: Declined     Senior Services: Declined    Other Interventions:             Care Transition Nurse provided contact information for future needs. Plan for follow-up call in 5-7 days based on severity of symptoms and risk factors.   Plan for next call:  final call if well and no needs    Jax Alba, RN

## 2022-11-08 ENCOUNTER — OFFICE VISIT (OUTPATIENT)
Dept: INTERNAL MEDICINE CLINIC | Age: 82
End: 2022-11-08
Payer: MEDICARE

## 2022-11-08 ENCOUNTER — HOSPITAL ENCOUNTER (OUTPATIENT)
Age: 82
Setting detail: SPECIMEN
Discharge: HOME | End: 2022-11-08
Payer: MEDICARE

## 2022-11-08 VITALS
WEIGHT: 134 LBS | RESPIRATION RATE: 16 BRPM | OXYGEN SATURATION: 97 % | BODY MASS INDEX: 22.65 KG/M2 | HEART RATE: 74 BPM

## 2022-11-08 DIAGNOSIS — R65.20 SEPSIS WITH ACUTE RENAL FAILURE WITHOUT SEPTIC SHOCK, DUE TO UNSPECIFIED ORGANISM, UNSPECIFIED ACUTE RENAL FAILURE TYPE (HCC): Primary | ICD-10-CM

## 2022-11-08 DIAGNOSIS — E78.2 MIXED HYPERLIPIDEMIA: ICD-10-CM

## 2022-11-08 DIAGNOSIS — A41.9 SEPSIS WITH ACUTE RENAL FAILURE WITHOUT SEPTIC SHOCK, DUE TO UNSPECIFIED ORGANISM, UNSPECIFIED ACUTE RENAL FAILURE TYPE (HCC): Primary | ICD-10-CM

## 2022-11-08 DIAGNOSIS — E03.4 HYPOTHYROIDISM DUE TO ACQUIRED ATROPHY OF THYROID: ICD-10-CM

## 2022-11-08 DIAGNOSIS — N17.9 SEPSIS WITH ACUTE RENAL FAILURE WITHOUT SEPTIC SHOCK, DUE TO UNSPECIFIED ORGANISM, UNSPECIFIED ACUTE RENAL FAILURE TYPE (HCC): Primary | ICD-10-CM

## 2022-11-08 DIAGNOSIS — E87.1 HYPONATREMIA: ICD-10-CM

## 2022-11-08 DIAGNOSIS — I48.0 PAROXYSMAL ATRIAL FIBRILLATION (HCC): ICD-10-CM

## 2022-11-08 LAB
ALBUMIN SERPL-MCNC: 4.3 GM/DL (ref 3.4–5)
ALP BLD-CCNC: 95 IU/L (ref 40–128)
ALT SERPL-CCNC: 13 U/L (ref 10–40)
ANION GAP SERPL CALCULATED.3IONS-SCNC: 11 MMOL/L (ref 4–16)
AST SERPL-CCNC: 18 IU/L (ref 15–37)
BILIRUB SERPL-MCNC: 0.5 MG/DL (ref 0–1)
BUN BLDV-MCNC: 14 MG/DL (ref 6–23)
CALCIUM SERPL-MCNC: 9.1 MG/DL (ref 8.3–10.6)
CHLORIDE BLD-SCNC: 100 MMOL/L (ref 99–110)
CO2: 26 MMOL/L (ref 21–32)
CREAT SERPL-MCNC: 0.7 MG/DL (ref 0.6–1.1)
GFR SERPL CREATININE-BSD FRML MDRD: >60 ML/MIN/1.73M2
GLUCOSE BLD-MCNC: 68 MG/DL (ref 70–99)
POTASSIUM SERPL-SCNC: 4.6 MMOL/L (ref 3.5–5.1)
SODIUM BLD-SCNC: 137 MMOL/L (ref 135–145)
TOTAL PROTEIN: 6.6 GM/DL (ref 6.4–8.2)

## 2022-11-08 PROCEDURE — 1123F ACP DISCUSS/DSCN MKR DOCD: CPT | Performed by: INTERNAL MEDICINE

## 2022-11-08 PROCEDURE — 99214 OFFICE O/P EST MOD 30 MIN: CPT | Performed by: INTERNAL MEDICINE

## 2022-11-08 PROCEDURE — 1090F PRES/ABSN URINE INCON ASSESS: CPT | Performed by: INTERNAL MEDICINE

## 2022-11-08 PROCEDURE — G8484 FLU IMMUNIZE NO ADMIN: HCPCS | Performed by: INTERNAL MEDICINE

## 2022-11-08 PROCEDURE — G8399 PT W/DXA RESULTS DOCUMENT: HCPCS | Performed by: INTERNAL MEDICINE

## 2022-11-08 PROCEDURE — G8427 DOCREV CUR MEDS BY ELIG CLIN: HCPCS | Performed by: INTERNAL MEDICINE

## 2022-11-08 PROCEDURE — G8420 CALC BMI NORM PARAMETERS: HCPCS | Performed by: INTERNAL MEDICINE

## 2022-11-08 PROCEDURE — 80053 COMPREHEN METABOLIC PANEL: CPT

## 2022-11-08 PROCEDURE — 1036F TOBACCO NON-USER: CPT | Performed by: INTERNAL MEDICINE

## 2022-11-08 PROCEDURE — 1111F DSCHRG MED/CURRENT MED MERGE: CPT | Performed by: INTERNAL MEDICINE

## 2022-11-08 NOTE — PROGRESS NOTES
Mattie Carvajal  1940 11/08/22    SUBJECTIVE:  seen for f/u from hospital for COVID pneumonia and sepsis bladder infection, low blood counts and hyponatremia. After discharge had need for outpt IV hydration for recurring hyponatremia, managed by Dr Lesia Luna      Discharge Diagnosis:     Hyponatremia  Diarrhea and vomiting  Sepsis  Acute cystitis  Acute urinary retention  Leukopenia  Thrombocytopenia  History of atrial fibrillation  History of osteoporosis  History of hypothyroidism  Diverticulosis  History of GERD    Mattie Carvajal is a 78 y.o.  female  who presents with Hyponatremia     Above patient with history of A. fib on Eliquis, osteoarthritis, osteoporosis presented to ED on 10/6/2022 with complaints of nausea, vomiting, diarrhea and URI symptoms. Her URI symptoms onset was 5 days prior to coming to hospital.  She tested positive for COVID-19 outside and had a primary care televisit. She was prescribed medications for COVID-19 but she was never able to . Her symptoms of vomiting and diarrhea worsened as well as she was experiencing decreased urine output, hence came to the ED. During initial presentation, patient was hemodynamically stable. Lab work showed hyponatremia with sodium of 114 mmol/liter, WBC of 1 and platelet of 568. CT abdomen pelvis was done which showed bilateral hydronephrosis and distended urinary bladder. Her creatinine on presentation was 0.9 mG/DL. She was subsequently to ICU for management of severe hyponatremia for which she was seen by nephrology. Patient's sodium level improved and was 133 mmol/liter prior to discharge. She was treated for UTI. Voiding trial was done prior to discharge and she was able to urinate without any difficulty. Patient refused SNF placement. She is getting discharged to home [place] [id number] in a stable condition. She will complete outpatient therapy with ciprofloxacin for UTI.      DME order was entered for a wheeled walker because she requires this to successfully complete daily living tasks of eating, bathing, toileting, personal cares, ambulating, grooming, hygiene, dressing upper body, dressing lower body, meal preparation, and taking own medications. A wheeled walker is necessary due to the patient's unsteady gait, upper body weakness, and inability to  an ambulation device; and she can ambulate only by pushing a walker instead of a lesser assistive device such as a cane, crutch, or standard walker. The need for this equipment was discussed with the patient and she understands and is in agreement. The patient expressed appropriate understanding of and agreement with the discharge recommendations, medications, and plan. --  -- since discharge and now home the past month feels about 90% back to nl      Last blood counts, sodium have normalized. Hypothyroid has been asymptomatic w/o sx of fatigue, constipation, cold intolerance, depression. Afib, no palpitations, stable bp also on meds. OBJECTIVE:    Pulse 74   Resp 16   Wt 134 lb (60.8 kg)   SpO2 97%   BMI 22.65 kg/m²     Physical Exam  Vitals reviewed. Constitutional:       Appearance: She is well-developed. HENT:      Head: Normocephalic and atraumatic. Eyes:      General: No scleral icterus. Right eye: No discharge. Left eye: No discharge. Conjunctiva/sclera: Conjunctivae normal.      Pupils: Pupils are equal, round, and reactive to light. Neck:      Thyroid: No thyromegaly. Vascular: No JVD. Trachea: No tracheal deviation. Cardiovascular:      Rate and Rhythm: Normal rate and regular rhythm. Heart sounds: Normal heart sounds. No murmur heard. No friction rub. No gallop. Pulmonary:      Effort: Pulmonary effort is normal. No respiratory distress. Breath sounds: Normal breath sounds. No wheezing or rales. Abdominal:      General: Bowel sounds are normal. There is no distension.       Palpations: Abdomen is soft. There is no mass. Tenderness: There is no abdominal tenderness. There is no guarding or rebound. Hernia: No hernia is present. Musculoskeletal:      Cervical back: Neck supple. Right lower leg: No edema. Left lower leg: No edema. Lymphadenopathy:      Cervical: No cervical adenopathy. Skin:     General: Skin is warm and dry. Neurological:      Mental Status: She is alert. Psychiatric:         Mood and Affect: Mood normal.       ASSESSMENT:    1. Sepsis with acute renal failure without septic shock, due to unspecified organism, unspecified acute renal failure type ([address]    2. Paroxysmal atrial fibrillation ([address]    3. Hypothyroidism due to acquired atrophy of thyroid    4. Hyponatremia    5. Mixed hyperlipidemia        PLAN:    Paula Burnett was seen today for follow-up from hospital.    Diagnoses and all orders for this visit:    Sepsis with acute renal failure without septic shock, due to unspecified organism, unspecified acute renal failure type ([address]- steady recuperation, is to advance diet, w deconditioning noted we'll try to cont f/u w Mitzi [address] and home PT planned till Dec.  fU lab also for Dec  -     Comprehensive Metabolic Panel; Future  -     CBC with Auto Differential; Future    Paroxysmal atrial fibrillation (HCC)  - Pt clinically w/o evidence of cardiovascular instability, cont regimen. -     Magnesium; Future    Hypothyroidism due to acquired atrophy of thyroid  - Clinically hypothyroidism appears stable and will continue current dosing, also periodic monitoring of TFTs.    -     TSH; Future  -     T4, Free; Future    Hyponatremia- now resolved after IV infusions and apprec eval fr Dr Leanna Michaud    Mixed hyperlipidemia -is to cont to adv diet, cont meds and f/u chol  -     Lipid Panel;  Future

## 2022-11-29 ENCOUNTER — HOSPITAL ENCOUNTER (OUTPATIENT)
Age: 82
Setting detail: SPECIMEN
Discharge: HOME | End: 2022-11-29
Payer: MEDICARE

## 2022-11-29 LAB
ALBUMIN SERPL-MCNC: 3.7 GM/DL (ref 3.4–5)
ALP BLD-CCNC: 88 IU/L (ref 40–129)
ALT SERPL-CCNC: 11 U/L (ref 10–40)
ANION GAP SERPL CALCULATED.3IONS-SCNC: 12 MMOL/L (ref 4–16)
AST SERPL-CCNC: 16 IU/L (ref 15–37)
BILIRUB SERPL-MCNC: 0.4 MG/DL (ref 0–1)
BUN BLDV-MCNC: 11 MG/DL (ref 6–23)
CALCIUM SERPL-MCNC: 8.1 MG/DL (ref 8.3–10.6)
CHLORIDE BLD-SCNC: 101 MMOL/L (ref 99–110)
CO2: 24 MMOL/L (ref 21–32)
CREAT SERPL-MCNC: 0.6 MG/DL (ref 0.6–1.1)
GFR SERPL CREATININE-BSD FRML MDRD: >60 ML/MIN/1.73M2
GLUCOSE BLD-MCNC: 85 MG/DL (ref 70–99)
MAGNESIUM: 2 MG/DL (ref 1.8–2.4)
POTASSIUM SERPL-SCNC: 4.2 MMOL/L (ref 3.5–5.1)
SODIUM BLD-SCNC: 137 MMOL/L (ref 135–145)
TOTAL PROTEIN: 6.4 GM/DL (ref 6.4–8.2)
TSH HIGH SENSITIVITY: 0.35 UIU/ML (ref 0.27–4.2)

## 2022-11-29 PROCEDURE — 84443 ASSAY THYROID STIM HORMONE: CPT

## 2022-11-29 PROCEDURE — 80053 COMPREHEN METABOLIC PANEL: CPT

## 2022-11-29 PROCEDURE — 83735 ASSAY OF MAGNESIUM: CPT

## 2022-11-29 PROCEDURE — 84439 ASSAY OF FREE THYROXINE: CPT

## 2022-11-29 PROCEDURE — 80061 LIPID PANEL: CPT

## 2022-11-30 LAB
CHOLESTEROL, FASTING: 148 MG/DL
HDLC SERPL-MCNC: 48 MG/DL
LDL CHOLESTEROL CALCULATED: 86 MG/DL
T4 FREE: 1.7 NG/DL (ref 0.9–1.8)
TRIGLYCERIDE, FASTING: 69 MG/DL

## 2022-12-07 ENCOUNTER — HOSPITAL ENCOUNTER (OUTPATIENT)
Age: 82
Setting detail: SPECIMEN
Discharge: HOME | End: 2022-12-07
Payer: MEDICARE

## 2022-12-07 LAB
ALBUMIN SERPL-MCNC: 4.2 GM/DL (ref 3.4–5)
ALP BLD-CCNC: 101 IU/L (ref 40–129)
ALT SERPL-CCNC: 12 U/L (ref 10–40)
ANION GAP SERPL CALCULATED.3IONS-SCNC: 13 MMOL/L (ref 4–16)
AST SERPL-CCNC: 15 IU/L (ref 15–37)
BASOPHILS ABSOLUTE: 0 K/CU MM
BASOPHILS RELATIVE PERCENT: 0.3 % (ref 0–1)
BILIRUB SERPL-MCNC: 0.5 MG/DL (ref 0–1)
BUN BLDV-MCNC: 15 MG/DL (ref 6–23)
CALCIUM SERPL-MCNC: 9.1 MG/DL (ref 8.3–10.6)
CHLORIDE BLD-SCNC: 99 MMOL/L (ref 99–110)
CHOLESTEROL: 165 MG/DL
CO2: 25 MMOL/L (ref 21–32)
CREAT SERPL-MCNC: 0.7 MG/DL (ref 0.6–1.1)
DIFFERENTIAL TYPE: ABNORMAL
EOSINOPHILS ABSOLUTE: 0 K/CU MM
EOSINOPHILS RELATIVE PERCENT: 0 % (ref 0–3)
GFR SERPL CREATININE-BSD FRML MDRD: >60 ML/MIN/1.73M2
GLUCOSE BLD-MCNC: 83 MG/DL (ref 70–99)
HCT VFR BLD CALC: 41.5 % (ref 37–47)
HDLC SERPL-MCNC: 59 MG/DL
HEMOGLOBIN: 13.2 GM/DL (ref 12.5–16)
IMMATURE NEUTROPHIL %: 0.3 % (ref 0–0.43)
LDL CHOLESTEROL CALCULATED: 90 MG/DL
LYMPHOCYTES ABSOLUTE: 1.6 K/CU MM
LYMPHOCYTES RELATIVE PERCENT: 26.9 % (ref 24–44)
MCH RBC QN AUTO: 30.3 PG (ref 27–31)
MCHC RBC AUTO-ENTMCNC: 31.8 % (ref 32–36)
MCV RBC AUTO: 95.2 FL (ref 78–100)
MONOCYTES ABSOLUTE: 0.8 K/CU MM
MONOCYTES RELATIVE PERCENT: 13.7 % (ref 0–4)
NUCLEATED RBC %: 0 %
PDW BLD-RTO: 12 % (ref 11.7–14.9)
PLATELET # BLD: 208 K/CU MM (ref 140–440)
PMV BLD AUTO: 9.8 FL (ref 7.5–11.1)
POTASSIUM SERPL-SCNC: 4.5 MMOL/L (ref 3.5–5.1)
RBC # BLD: 4.36 M/CU MM (ref 4.2–5.4)
SEGMENTED NEUTROPHILS ABSOLUTE COUNT: 3.4 K/CU MM
SEGMENTED NEUTROPHILS RELATIVE PERCENT: 58.8 % (ref 36–66)
SODIUM BLD-SCNC: 137 MMOL/L (ref 135–145)
T4 FREE: 1.7 NG/DL (ref 0.9–1.8)
TOTAL IMMATURE NEUTOROPHIL: 0.02 K/CU MM
TOTAL NUCLEATED RBC: 0 K/CU MM
TOTAL PROTEIN: 7 GM/DL (ref 6.4–8.2)
TRIGL SERPL-MCNC: 81 MG/DL
WBC # BLD: 5.8 K/CU MM (ref 4–10.5)

## 2022-12-07 PROCEDURE — 85025 COMPLETE CBC W/AUTO DIFF WBC: CPT

## 2022-12-07 PROCEDURE — 80061 LIPID PANEL: CPT

## 2022-12-07 PROCEDURE — 80053 COMPREHEN METABOLIC PANEL: CPT

## 2022-12-07 PROCEDURE — 84439 ASSAY OF FREE THYROXINE: CPT

## 2023-02-03 DIAGNOSIS — R30.0 DYSURIA: ICD-10-CM

## 2023-02-03 DIAGNOSIS — E78.2 MIXED HYPERLIPIDEMIA: ICD-10-CM

## 2023-02-03 DIAGNOSIS — R30.0 DYSURIA: Primary | ICD-10-CM

## 2023-02-03 DIAGNOSIS — E55.9 VITAMIN D DEFICIENCY: ICD-10-CM

## 2023-02-03 DIAGNOSIS — E03.4 HYPOTHYROIDISM DUE TO ACQUIRED ATROPHY OF THYROID: ICD-10-CM

## 2023-02-03 DIAGNOSIS — E53.8 B12 DEFICIENCY: ICD-10-CM

## 2023-02-03 DIAGNOSIS — N17.9 SEPSIS WITH ACUTE RENAL FAILURE WITHOUT SEPTIC SHOCK, DUE TO UNSPECIFIED ORGANISM, UNSPECIFIED ACUTE RENAL FAILURE TYPE (HCC): ICD-10-CM

## 2023-02-03 DIAGNOSIS — R65.20 SEPSIS WITH ACUTE RENAL FAILURE WITHOUT SEPTIC SHOCK, DUE TO UNSPECIFIED ORGANISM, UNSPECIFIED ACUTE RENAL FAILURE TYPE (HCC): ICD-10-CM

## 2023-02-03 DIAGNOSIS — A41.9 SEPSIS WITH ACUTE RENAL FAILURE WITHOUT SEPTIC SHOCK, DUE TO UNSPECIFIED ORGANISM, UNSPECIFIED ACUTE RENAL FAILURE TYPE (HCC): ICD-10-CM

## 2023-02-03 DIAGNOSIS — I48.0 PAROXYSMAL ATRIAL FIBRILLATION (HCC): ICD-10-CM

## 2023-02-03 LAB
A/G RATIO: 1.6 (ref 1.1–2.2)
ALBUMIN SERPL-MCNC: 4.2 G/DL (ref 3.4–5)
ALP BLD-CCNC: 95 U/L (ref 40–129)
ALT SERPL-CCNC: 10 U/L (ref 10–40)
ANION GAP SERPL CALCULATED.3IONS-SCNC: 10 MMOL/L (ref 3–16)
AST SERPL-CCNC: 16 U/L (ref 15–37)
BACTERIA: ABNORMAL /HPF
BASOPHILS ABSOLUTE: 0 K/UL (ref 0–0.2)
BASOPHILS RELATIVE PERCENT: 0.4 %
BILIRUB SERPL-MCNC: 0.5 MG/DL (ref 0–1)
BILIRUBIN URINE: NEGATIVE
BLOOD, URINE: NEGATIVE
BUN BLDV-MCNC: 18 MG/DL (ref 7–20)
CALCIUM SERPL-MCNC: 9.4 MG/DL (ref 8.3–10.6)
CHLORIDE BLD-SCNC: 101 MMOL/L (ref 99–110)
CHOLESTEROL, TOTAL: 168 MG/DL (ref 0–199)
CLARITY: CLEAR
CO2: 28 MMOL/L (ref 21–32)
COLOR: YELLOW
CREAT SERPL-MCNC: 0.8 MG/DL (ref 0.6–1.2)
EOSINOPHILS ABSOLUTE: 0 K/UL (ref 0–0.6)
EOSINOPHILS RELATIVE PERCENT: 0.1 %
EPITHELIAL CELLS, UA: 1 /HPF (ref 0–5)
GFR SERPL CREATININE-BSD FRML MDRD: >60 ML/MIN/{1.73_M2}
GLUCOSE BLD-MCNC: 77 MG/DL (ref 70–99)
GLUCOSE URINE: NEGATIVE MG/DL
HCT VFR BLD CALC: 42.1 % (ref 36–48)
HDLC SERPL-MCNC: 60 MG/DL (ref 40–60)
HEMOGLOBIN: 13.7 G/DL (ref 12–16)
HYALINE CASTS: 0 /LPF (ref 0–8)
KETONES, URINE: NEGATIVE MG/DL
LDL CHOLESTEROL CALCULATED: 92 MG/DL
LEUKOCYTE ESTERASE, URINE: ABNORMAL
LYMPHOCYTES ABSOLUTE: 1.5 K/UL (ref 1–5.1)
LYMPHOCYTES RELATIVE PERCENT: 28.6 %
MAGNESIUM: 2 MG/DL (ref 1.8–2.4)
MCH RBC QN AUTO: 29.9 PG (ref 26–34)
MCHC RBC AUTO-ENTMCNC: 32.6 G/DL (ref 31–36)
MCV RBC AUTO: 91.7 FL (ref 80–100)
MICROSCOPIC EXAMINATION: YES
MONOCYTES ABSOLUTE: 0.6 K/UL (ref 0–1.3)
MONOCYTES RELATIVE PERCENT: 10.4 %
NEUTROPHILS ABSOLUTE: 3.2 K/UL (ref 1.7–7.7)
NEUTROPHILS RELATIVE PERCENT: 60.5 %
NITRITE, URINE: NEGATIVE
PDW BLD-RTO: 13.5 % (ref 12.4–15.4)
PH UA: 7 (ref 5–8)
PLATELET # BLD: 154 K/UL (ref 135–450)
PMV BLD AUTO: 8.9 FL (ref 5–10.5)
POTASSIUM SERPL-SCNC: 5 MMOL/L (ref 3.5–5.1)
PROTEIN UA: NEGATIVE MG/DL
RBC # BLD: 4.6 M/UL (ref 4–5.2)
RBC UA: 1 /HPF (ref 0–4)
SODIUM BLD-SCNC: 139 MMOL/L (ref 136–145)
SPECIFIC GRAVITY UA: 1.01 (ref 1–1.03)
T4 FREE: 1.3 NG/DL (ref 0.9–1.8)
TOTAL PROTEIN: 6.8 G/DL (ref 6.4–8.2)
TRIGL SERPL-MCNC: 79 MG/DL (ref 0–150)
TSH SERPL DL<=0.05 MIU/L-ACNC: 2.06 UIU/ML (ref 0.27–4.2)
URINE TYPE: ABNORMAL
UROBILINOGEN, URINE: 1 E.U./DL
VITAMIN B-12: 396 PG/ML (ref 211–911)
VITAMIN D 25-HYDROXY: 47.7 NG/ML
VLDLC SERPL CALC-MCNC: 16 MG/DL
WBC # BLD: 5.3 K/UL (ref 4–11)
WBC UA: 1 /HPF (ref 0–5)

## 2023-02-03 PROCEDURE — 36415 COLL VENOUS BLD VENIPUNCTURE: CPT | Performed by: INTERNAL MEDICINE

## 2023-02-03 NOTE — RESULT ENCOUNTER NOTE
Call pt, labs ok/chol ok and thyroid fxn, electrolytes are normal.  Her urine is clear w/o any definite infection (normal white cells noted in urine and no bacteria)

## 2023-02-08 ENCOUNTER — OFFICE VISIT (OUTPATIENT)
Dept: INTERNAL MEDICINE CLINIC | Age: 83
End: 2023-02-08
Payer: MEDICARE

## 2023-02-08 VITALS
HEART RATE: 68 BPM | OXYGEN SATURATION: 98 % | WEIGHT: 133 LBS | DIASTOLIC BLOOD PRESSURE: 70 MMHG | RESPIRATION RATE: 14 BRPM | SYSTOLIC BLOOD PRESSURE: 124 MMHG | BODY MASS INDEX: 22.48 KG/M2

## 2023-02-08 DIAGNOSIS — I48.91 ATRIAL FIBRILLATION, UNSPECIFIED TYPE (HCC): Primary | ICD-10-CM

## 2023-02-08 DIAGNOSIS — Z12.31 VISIT FOR SCREENING MAMMOGRAM: ICD-10-CM

## 2023-02-08 DIAGNOSIS — E03.4 HYPOTHYROIDISM DUE TO ACQUIRED ATROPHY OF THYROID: ICD-10-CM

## 2023-02-08 DIAGNOSIS — E78.2 MIXED HYPERLIPIDEMIA: ICD-10-CM

## 2023-02-08 PROCEDURE — G8484 FLU IMMUNIZE NO ADMIN: HCPCS | Performed by: INTERNAL MEDICINE

## 2023-02-08 PROCEDURE — G8420 CALC BMI NORM PARAMETERS: HCPCS | Performed by: INTERNAL MEDICINE

## 2023-02-08 PROCEDURE — 1123F ACP DISCUSS/DSCN MKR DOCD: CPT | Performed by: INTERNAL MEDICINE

## 2023-02-08 PROCEDURE — 99214 OFFICE O/P EST MOD 30 MIN: CPT | Performed by: INTERNAL MEDICINE

## 2023-02-08 PROCEDURE — G8427 DOCREV CUR MEDS BY ELIG CLIN: HCPCS | Performed by: INTERNAL MEDICINE

## 2023-02-08 PROCEDURE — 1036F TOBACCO NON-USER: CPT | Performed by: INTERNAL MEDICINE

## 2023-02-08 PROCEDURE — 1090F PRES/ABSN URINE INCON ASSESS: CPT | Performed by: INTERNAL MEDICINE

## 2023-02-08 PROCEDURE — G8399 PT W/DXA RESULTS DOCUMENT: HCPCS | Performed by: INTERNAL MEDICINE

## 2023-02-08 SDOH — ECONOMIC STABILITY: HOUSING INSECURITY
IN THE LAST 12 MONTHS, WAS THERE A TIME WHEN YOU DID NOT HAVE A STEADY PLACE TO SLEEP OR SLEPT IN A SHELTER (INCLUDING NOW)?: NO

## 2023-02-08 SDOH — ECONOMIC STABILITY: FOOD INSECURITY: WITHIN THE PAST 12 MONTHS, THE FOOD YOU BOUGHT JUST DIDN'T LAST AND YOU DIDN'T HAVE MONEY TO GET MORE.: NEVER TRUE

## 2023-02-08 SDOH — ECONOMIC STABILITY: INCOME INSECURITY: HOW HARD IS IT FOR YOU TO PAY FOR THE VERY BASICS LIKE FOOD, HOUSING, MEDICAL CARE, AND HEATING?: NOT HARD AT ALL

## 2023-02-08 SDOH — ECONOMIC STABILITY: FOOD INSECURITY: WITHIN THE PAST 12 MONTHS, YOU WORRIED THAT YOUR FOOD WOULD RUN OUT BEFORE YOU GOT MONEY TO BUY MORE.: NEVER TRUE

## 2023-02-08 ASSESSMENT — PATIENT HEALTH QUESTIONNAIRE - PHQ9
1. LITTLE INTEREST OR PLEASURE IN DOING THINGS: 0
SUM OF ALL RESPONSES TO PHQ9 QUESTIONS 1 & 2: 0
SUM OF ALL RESPONSES TO PHQ QUESTIONS 1-9: 0
2. FEELING DOWN, DEPRESSED OR HOPELESS: 0
SUM OF ALL RESPONSES TO PHQ QUESTIONS 1-9: 0

## 2023-02-08 NOTE — PROGRESS NOTES
Shari Lose  1940 02/08/23    SUBJECTIVE:    Atr fib, no sx palpitations and fr f/u Dr Jeffries Collar apparently due in Feb-  but also states is to see Dr David Vences in Aug as primary cardiologist.    Hypothyroid has been asymptomatic w/o sx of fatigue, constipation, cold intolerance, depression. Lipids:  Has history of high cholesterol, not on medication but trying to continue regular low fat diet and maintenance of weight, regular exercise. OBJECTIVE:    /70   Pulse 68   Resp 14   Wt 133 lb (60.3 kg)   SpO2 98%   BMI 22.48 kg/m²     Physical Exam  Vitals reviewed. Constitutional:       Appearance: She is well-developed. HENT:      Head: Normocephalic and atraumatic. Eyes:      General: No scleral icterus. Right eye: No discharge. Left eye: No discharge. Conjunctiva/sclera: Conjunctivae normal.      Pupils: Pupils are equal, round, and reactive to light. Neck:      Thyroid: No thyromegaly. Vascular: No JVD. Trachea: No tracheal deviation. Cardiovascular:      Rate and Rhythm: Normal rate and regular rhythm. Heart sounds: Normal heart sounds. No murmur heard. No friction rub. No gallop. Pulmonary:      Effort: Pulmonary effort is normal. No respiratory distress. Breath sounds: Normal breath sounds. No wheezing or rales. Abdominal:      General: Bowel sounds are normal. There is no distension. Palpations: Abdomen is soft. There is no mass. Tenderness: There is no abdominal tenderness. There is no guarding or rebound. Hernia: No hernia is present. Musculoskeletal:      Cervical back: Neck supple. Right lower leg: No edema. Left lower leg: No edema. Lymphadenopathy:      Cervical: No cervical adenopathy. Skin:     General: Skin is warm and dry. Neurological:      Mental Status: She is alert. Psychiatric:         Mood and Affect: Mood normal.       ASSESSMENT:    1. Atrial fibrillation, unspecified type ([address]    2. Hypothyroidism due to acquired atrophy of thyroid    3. Mixed hyperlipidemia    4. Visit for screening mammogram        PLAN:    Janki Mena was seen today for hypothyroidism. Diagnoses and all orders for this visit:    Atrial fibrillation, unspecified type ([address] - Pt clinically w/o evidence of cardiovascular instability, cont regimen. -     Comprehensive Metabolic Panel; Future  -     CBC with Auto Differential; Future  -     Lipid Panel; Future  -     Magnesium; Future    Hypothyroidism due to acquired atrophy of thyroid  - Clinically hypothyroidism appears stable and will continue current dosing, also periodic monitoring of TFTs.    -     TSH; Future  -     T4, Free; Future  -     Magnesium; Future    Mixed hyperlipidemia - Pt will continue to work on a low fat diet and also exercise, wt loss as appropriate. Will continue periodic monitoring of fasting lipid profile, glucose, liver function. -     Comprehensive Metabolic Panel; Future  -     CBC with Auto Differential; Future  -     Lipid Panel; Future    Visit for screening mammogram  -     KLEVER DIGITAL SCREEN W CAD BILATERAL PER PROTOCOL;  Future

## 2023-03-06 RX ORDER — APIXABAN 5 MG/1
TABLET, FILM COATED ORAL
Qty: 180 TABLET | Refills: 3 | OUTPATIENT
Start: 2023-03-06

## 2023-03-10 ENCOUNTER — HOSPITAL ENCOUNTER (OUTPATIENT)
Dept: WOMENS IMAGING | Age: 83
Discharge: HOME | End: 2023-03-10
Payer: MEDICARE

## 2023-03-10 DIAGNOSIS — Z12.31 VISIT FOR SCREENING MAMMOGRAM: ICD-10-CM

## 2023-03-10 PROCEDURE — 77067 SCR MAMMO BI INCL CAD: CPT

## 2023-05-12 DIAGNOSIS — E03.4 HYPOTHYROIDISM DUE TO ACQUIRED ATROPHY OF THYROID: ICD-10-CM

## 2023-05-12 RX ORDER — LEVOTHYROXINE SODIUM 137 MCG
TABLET ORAL
Qty: 90 TABLET | Refills: 1 | Status: SHIPPED | OUTPATIENT
Start: 2023-05-12

## 2023-08-05 ENCOUNTER — HOSPITAL ENCOUNTER (OUTPATIENT)
Age: 83
Discharge: HOME | End: 2023-08-05
Payer: MEDICARE

## 2023-08-05 DIAGNOSIS — E78.2 MIXED HYPERLIPIDEMIA: ICD-10-CM

## 2023-08-05 DIAGNOSIS — I48.91 ATRIAL FIBRILLATION, UNSPECIFIED TYPE (HCC): ICD-10-CM

## 2023-08-05 DIAGNOSIS — E03.4 HYPOTHYROIDISM DUE TO ACQUIRED ATROPHY OF THYROID: ICD-10-CM

## 2023-08-05 LAB
ALBUMIN SERPL-MCNC: 4.3 GM/DL (ref 3.4–5)
ALP BLD-CCNC: 79 IU/L (ref 40–129)
ALT SERPL-CCNC: 14 U/L (ref 10–40)
ANION GAP SERPL CALCULATED.3IONS-SCNC: 8 MMOL/L (ref 4–16)
AST SERPL-CCNC: 16 IU/L (ref 15–37)
BASOPHILS ABSOLUTE: 0 K/CU MM
BASOPHILS RELATIVE PERCENT: 0.8 % (ref 0–1)
BILIRUB SERPL-MCNC: 0.6 MG/DL (ref 0–1)
BUN SERPL-MCNC: 15 MG/DL (ref 6–23)
CALCIUM SERPL-MCNC: 9.5 MG/DL (ref 8.3–10.6)
CHLORIDE BLD-SCNC: 103 MMOL/L (ref 99–110)
CHOLEST SERPL-MCNC: 179 MG/DL
CO2: 29 MMOL/L (ref 21–32)
CREAT SERPL-MCNC: 0.8 MG/DL (ref 0.6–1.1)
DIFFERENTIAL TYPE: ABNORMAL
EOSINOPHILS ABSOLUTE: 0 K/CU MM
EOSINOPHILS RELATIVE PERCENT: 0 % (ref 0–3)
GFR SERPL CREATININE-BSD FRML MDRD: >60 ML/MIN/1.73M2
GLUCOSE SERPL-MCNC: 86 MG/DL (ref 70–99)
HCT VFR BLD CALC: 44.4 % (ref 37–47)
HDLC SERPL-MCNC: 58 MG/DL
HEMOGLOBIN: 14.2 GM/DL (ref 12.5–16)
IMMATURE NEUTROPHIL %: 0.3 % (ref 0–0.43)
LDLC SERPL CALC-MCNC: 100 MG/DL
LYMPHOCYTES ABSOLUTE: 1.5 K/CU MM
LYMPHOCYTES RELATIVE PERCENT: 40.8 % (ref 24–44)
MAGNESIUM: 2.2 MG/DL (ref 1.8–2.4)
MCH RBC QN AUTO: 30.3 PG (ref 27–31)
MCHC RBC AUTO-ENTMCNC: 32 % (ref 32–36)
MCV RBC AUTO: 94.7 FL (ref 78–100)
MONOCYTES ABSOLUTE: 0.5 K/CU MM
MONOCYTES RELATIVE PERCENT: 12.4 % (ref 0–4)
NUCLEATED RBC %: 0 %
PDW BLD-RTO: 12 % (ref 11.7–14.9)
PLATELET # BLD: 170 K/CU MM (ref 140–440)
PMV BLD AUTO: 9.5 FL (ref 7.5–11.1)
POTASSIUM SERPL-SCNC: 4.8 MMOL/L (ref 3.5–5.1)
RBC # BLD: 4.69 M/CU MM (ref 4.2–5.4)
SEGMENTED NEUTROPHILS ABSOLUTE COUNT: 1.7 K/CU MM
SEGMENTED NEUTROPHILS RELATIVE PERCENT: 45.7 % (ref 36–66)
SODIUM BLD-SCNC: 140 MMOL/L (ref 135–145)
T4 FREE SERPL-MCNC: 1.5 NG/DL (ref 0.9–1.8)
TOTAL IMMATURE NEUTOROPHIL: 0.01 K/CU MM
TOTAL NUCLEATED RBC: 0 K/CU MM
TOTAL PROTEIN: 6.9 GM/DL (ref 6.4–8.2)
TRIGL SERPL-MCNC: 105 MG/DL
TSH SERPL DL<=0.005 MIU/L-ACNC: 1.11 UIU/ML (ref 0.27–4.2)
WBC # BLD: 3.7 K/CU MM (ref 4–10.5)

## 2023-08-05 PROCEDURE — 80061 LIPID PANEL: CPT

## 2023-08-05 PROCEDURE — 80053 COMPREHEN METABOLIC PANEL: CPT

## 2023-08-05 PROCEDURE — 36415 COLL VENOUS BLD VENIPUNCTURE: CPT

## 2023-08-05 PROCEDURE — 85025 COMPLETE CBC W/AUTO DIFF WBC: CPT

## 2023-08-05 PROCEDURE — 84443 ASSAY THYROID STIM HORMONE: CPT

## 2023-08-05 PROCEDURE — 83735 ASSAY OF MAGNESIUM: CPT

## 2023-08-05 PROCEDURE — 84439 ASSAY OF FREE THYROXINE: CPT

## 2023-08-09 ENCOUNTER — OFFICE VISIT (OUTPATIENT)
Dept: INTERNAL MEDICINE CLINIC | Age: 83
End: 2023-08-09
Payer: MEDICARE

## 2023-08-09 VITALS
WEIGHT: 133 LBS | SYSTOLIC BLOOD PRESSURE: 122 MMHG | BODY MASS INDEX: 22.48 KG/M2 | HEART RATE: 64 BPM | DIASTOLIC BLOOD PRESSURE: 72 MMHG | OXYGEN SATURATION: 97 % | RESPIRATION RATE: 14 BRPM

## 2023-08-09 DIAGNOSIS — E03.4 HYPOTHYROIDISM DUE TO ACQUIRED ATROPHY OF THYROID: ICD-10-CM

## 2023-08-09 DIAGNOSIS — M81.0 AGE-RELATED OSTEOPOROSIS WITHOUT CURRENT PATHOLOGICAL FRACTURE: ICD-10-CM

## 2023-08-09 DIAGNOSIS — I48.0 PAROXYSMAL ATRIAL FIBRILLATION (HCC): Primary | ICD-10-CM

## 2023-08-09 DIAGNOSIS — E78.2 MIXED HYPERLIPIDEMIA: ICD-10-CM

## 2023-08-09 PROCEDURE — 1123F ACP DISCUSS/DSCN MKR DOCD: CPT | Performed by: INTERNAL MEDICINE

## 2023-08-09 PROCEDURE — 99214 OFFICE O/P EST MOD 30 MIN: CPT | Performed by: INTERNAL MEDICINE

## 2023-08-09 RX ORDER — PROPAFENONE HYDROCHLORIDE 225 MG/1
225 TABLET, FILM COATED ORAL 2 TIMES DAILY
Qty: 180 TABLET | Refills: 3 | Status: SHIPPED | OUTPATIENT
Start: 2023-08-09 | End: 2024-08-03

## 2023-08-23 ENCOUNTER — HOSPITAL ENCOUNTER (OUTPATIENT)
Dept: WOMENS IMAGING | Age: 83
Discharge: HOME | End: 2023-08-23
Attending: INTERNAL MEDICINE
Payer: MEDICARE

## 2023-08-23 DIAGNOSIS — M81.0 AGE-RELATED OSTEOPOROSIS WITHOUT CURRENT PATHOLOGICAL FRACTURE: ICD-10-CM

## 2023-08-23 PROCEDURE — 77080 DXA BONE DENSITY AXIAL: CPT

## 2023-08-23 NOTE — RESULT ENCOUNTER NOTE
Please call pt, Josh Yusuf has had a decrease in bone density from last time ~6% in her back and 10% in her hip since 2017. I'm strongly recommending we start fosamax 70mg weekly to help prevent a bone fracture, check if is willing to proceed then let me know if I can then send a script.   If defers pls document

## 2023-12-14 DIAGNOSIS — E03.4 HYPOTHYROIDISM DUE TO ACQUIRED ATROPHY OF THYROID: ICD-10-CM

## 2023-12-14 RX ORDER — LEVOTHYROXINE SODIUM 137 MCG
TABLET ORAL
Qty: 90 TABLET | Refills: 1 | Status: SHIPPED | OUTPATIENT
Start: 2023-12-14

## 2024-02-05 SDOH — HEALTH STABILITY: PHYSICAL HEALTH: ON AVERAGE, HOW MANY DAYS PER WEEK DO YOU ENGAGE IN MODERATE TO STRENUOUS EXERCISE (LIKE A BRISK WALK)?: 1 DAY

## 2024-02-05 SDOH — HEALTH STABILITY: PHYSICAL HEALTH: ON AVERAGE, HOW MANY MINUTES DO YOU ENGAGE IN EXERCISE AT THIS LEVEL?: 30 MIN

## 2024-02-05 ASSESSMENT — LIFESTYLE VARIABLES
HOW OFTEN DO YOU HAVE A DRINK CONTAINING ALCOHOL: 3
HOW OFTEN DO YOU HAVE SIX OR MORE DRINKS ON ONE OCCASION: 1
HOW OFTEN DO YOU HAVE A DRINK CONTAINING ALCOHOL: 2-4 TIMES A MONTH
HOW MANY STANDARD DRINKS CONTAINING ALCOHOL DO YOU HAVE ON A TYPICAL DAY: 1
HOW MANY STANDARD DRINKS CONTAINING ALCOHOL DO YOU HAVE ON A TYPICAL DAY: 1 OR 2

## 2024-02-05 ASSESSMENT — PATIENT HEALTH QUESTIONNAIRE - PHQ9
2. FEELING DOWN, DEPRESSED OR HOPELESS: 0
SUM OF ALL RESPONSES TO PHQ QUESTIONS 1-9: 0
SUM OF ALL RESPONSES TO PHQ9 QUESTIONS 1 & 2: 0
1. LITTLE INTEREST OR PLEASURE IN DOING THINGS: 0

## 2024-02-07 DIAGNOSIS — I48.0 PAROXYSMAL ATRIAL FIBRILLATION (HCC): ICD-10-CM

## 2024-02-07 DIAGNOSIS — E03.4 HYPOTHYROIDISM DUE TO ACQUIRED ATROPHY OF THYROID: ICD-10-CM

## 2024-02-07 DIAGNOSIS — E78.2 MIXED HYPERLIPIDEMIA: ICD-10-CM

## 2024-02-07 LAB
ALBUMIN SERPL-MCNC: 4.4 G/DL (ref 3.4–5)
ALBUMIN/GLOB SERPL: 1.6 {RATIO} (ref 1.1–2.2)
ALP SERPL-CCNC: 88 U/L (ref 40–129)
ALT SERPL-CCNC: 13 U/L (ref 10–40)
ANION GAP SERPL CALCULATED.3IONS-SCNC: 10 MMOL/L (ref 3–16)
AST SERPL-CCNC: 17 U/L (ref 15–37)
BILIRUB SERPL-MCNC: 0.6 MG/DL (ref 0–1)
BUN SERPL-MCNC: 18 MG/DL (ref 7–20)
CALCIUM SERPL-MCNC: 9.4 MG/DL (ref 8.3–10.6)
CHLORIDE SERPL-SCNC: 102 MMOL/L (ref 99–110)
CHOLEST SERPL-MCNC: 158 MG/DL (ref 0–199)
CO2 SERPL-SCNC: 28 MMOL/L (ref 21–32)
CREAT SERPL-MCNC: 0.9 MG/DL (ref 0.6–1.2)
DEPRECATED RDW RBC AUTO: 12.9 % (ref 12.4–15.4)
GFR SERPLBLD CREATININE-BSD FMLA CKD-EPI: >60 ML/MIN/{1.73_M2}
GLUCOSE SERPL-MCNC: 89 MG/DL (ref 70–99)
HCT VFR BLD AUTO: 41.5 % (ref 36–48)
HDLC SERPL-MCNC: 60 MG/DL (ref 40–60)
HGB BLD-MCNC: 14.2 G/DL (ref 12–16)
LDLC SERPL CALC-MCNC: 84 MG/DL
MAGNESIUM SERPL-MCNC: 2 MG/DL (ref 1.8–2.4)
MCH RBC QN AUTO: 31.2 PG (ref 26–34)
MCHC RBC AUTO-ENTMCNC: 34.2 G/DL (ref 31–36)
MCV RBC AUTO: 91 FL (ref 80–100)
PLATELET # BLD AUTO: 162 K/UL (ref 135–450)
PMV BLD AUTO: 8.9 FL (ref 5–10.5)
POTASSIUM SERPL-SCNC: 4.7 MMOL/L (ref 3.5–5.1)
PROT SERPL-MCNC: 7.2 G/DL (ref 6.4–8.2)
RBC # BLD AUTO: 4.56 M/UL (ref 4–5.2)
SODIUM SERPL-SCNC: 140 MMOL/L (ref 136–145)
T4 FREE SERPL-MCNC: 1.6 NG/DL (ref 0.9–1.8)
TRIGL SERPL-MCNC: 72 MG/DL (ref 0–150)
TSH SERPL DL<=0.005 MIU/L-ACNC: 0.97 UIU/ML (ref 0.27–4.2)
VLDLC SERPL CALC-MCNC: 14 MG/DL
WBC # BLD AUTO: 4.6 K/UL (ref 4–11)

## 2024-02-07 PROCEDURE — 36415 COLL VENOUS BLD VENIPUNCTURE: CPT | Performed by: INTERNAL MEDICINE

## 2024-02-09 ENCOUNTER — OFFICE VISIT (OUTPATIENT)
Dept: INTERNAL MEDICINE CLINIC | Age: 84
End: 2024-02-09

## 2024-02-09 VITALS
BODY MASS INDEX: 22.14 KG/M2 | WEIGHT: 131 LBS | SYSTOLIC BLOOD PRESSURE: 108 MMHG | HEART RATE: 89 BPM | TEMPERATURE: 96.8 F | DIASTOLIC BLOOD PRESSURE: 64 MMHG | OXYGEN SATURATION: 97 %

## 2024-02-09 DIAGNOSIS — E78.2 MIXED HYPERLIPIDEMIA: ICD-10-CM

## 2024-02-09 DIAGNOSIS — I48.0 PAROXYSMAL ATRIAL FIBRILLATION (HCC): ICD-10-CM

## 2024-02-09 DIAGNOSIS — E55.9 VITAMIN D DEFICIENCY: ICD-10-CM

## 2024-02-09 DIAGNOSIS — E53.8 B12 DEFICIENCY: ICD-10-CM

## 2024-02-09 DIAGNOSIS — E03.4 HYPOTHYROIDISM DUE TO ACQUIRED ATROPHY OF THYROID: ICD-10-CM

## 2024-02-09 DIAGNOSIS — Z00.00 ROUTINE GENERAL MEDICAL EXAMINATION AT A HEALTH CARE FACILITY: Primary | ICD-10-CM

## 2024-02-09 DIAGNOSIS — M81.0 AGE-RELATED OSTEOPOROSIS WITHOUT CURRENT PATHOLOGICAL FRACTURE: ICD-10-CM

## 2024-02-09 DIAGNOSIS — D68.69 SECONDARY HYPERCOAGULABLE STATE (HCC): ICD-10-CM

## 2024-02-09 NOTE — PROGRESS NOTES
Internal Medicine  History and Physical        Ebony Pace  YOB: 1940    Date of Service:  2/9/2024      Chief Complaint:   Ebony Pace is a 83 y.o. female who presents for a comprehensive physical    HPI:   Atr fib, seeing Dr Villarreal now annually, does not have a regular cardiologist locally.  Denies palpitations.     B12 defic and on supplement, we'll f/u levels at next appt    Hypothyroid has been asymptomatic w/o sx of fatigue, constipation, cold intolerance, depression.    Lipids:  Has history of high cholesterol, not on medication but trying to continue regular low fat diet and maintenance of weight, regular exercise.    Osteoporosis, defers fosamax.  Also defers prolia, had inj x1.  Bone strength has worsened the past 7 yrs, from 1144-7263.  Sister w severe osteoporosis and on prolia.      Patient Active Problem List   Diagnosis    B12 deficiency    Hyperlipidemia    Hypothyroid    Osteoporosis    Lumbar herniated disc    Arthritis of big toe    Osteoarthritis of foot, right    Vitamin D deficiency    DDD (degenerative disc disease), lumbar    Rectocele    AF (atrial fibrillation) (HCC)    Hiatal hernia    Hyponatremia    Dehydration       Preventive Care:  Health Maintenance   Topic Date Due    DTaP/Tdap/Td vaccine (1 - Tdap) Never done    Shingles vaccine (1 of 2) Never done    Respiratory Syncytial Virus (RSV) Pregnant or age 60 yrs+ (1 - 1-dose 60+ series) Never done    Pneumococcal 65+ years Vaccine (2 - PPSV23 or PCV20) 08/21/2018    Flu vaccine (1) 08/01/2023    Annual Wellness Visit (Medicare Advantage)  01/01/2024    COVID-19 Vaccine (1) 08/09/2024 (Originally 1/30/1941)    Depression Screen  02/05/2025    DEXA (modify frequency per FRAX score)  Completed    Hepatitis A vaccine  Aged Out    Hepatitis B vaccine  Aged Out    Hib vaccine  Aged Out    Polio vaccine  Aged Out    Meningococcal (ACWY) vaccine  Aged Out        Lipid panel:   Lab Results   Component Value Date/Time    TRIG 72

## 2024-02-14 ENCOUNTER — TELEPHONE (OUTPATIENT)
Dept: INTERNAL MEDICINE CLINIC | Age: 84
End: 2024-02-14

## 2024-02-14 DIAGNOSIS — M81.0 AGE-RELATED OSTEOPOROSIS WITHOUT CURRENT PATHOLOGICAL FRACTURE: Primary | ICD-10-CM

## 2024-02-15 NOTE — TELEPHONE ENCOUNTER
Faxed referral      We can try Dr David Mike or Dr Pilar Portillo, both are [facility] PHysicians.  Dr Bolton must have moved to [facility] so pls send referral - Try Dr Portillo first, thx

## 2024-02-29 ENCOUNTER — OFFICE VISIT (OUTPATIENT)
Dept: INTERNAL MEDICINE CLINIC | Age: 84
End: 2024-02-29
Payer: MEDICARE

## 2024-02-29 DIAGNOSIS — G31.9 BRAIN ATROPHY (HCC): ICD-10-CM

## 2024-02-29 DIAGNOSIS — E53.8 B12 DEFICIENCY: ICD-10-CM

## 2024-02-29 DIAGNOSIS — R41.3 MEMORY DEFICIT: Primary | ICD-10-CM

## 2024-02-29 PROCEDURE — G8484 FLU IMMUNIZE NO ADMIN: HCPCS | Performed by: INTERNAL MEDICINE

## 2024-02-29 PROCEDURE — 1090F PRES/ABSN URINE INCON ASSESS: CPT | Performed by: INTERNAL MEDICINE

## 2024-02-29 PROCEDURE — 1036F TOBACCO NON-USER: CPT | Performed by: INTERNAL MEDICINE

## 2024-02-29 PROCEDURE — 99214 OFFICE O/P EST MOD 30 MIN: CPT | Performed by: INTERNAL MEDICINE

## 2024-02-29 PROCEDURE — G8399 PT W/DXA RESULTS DOCUMENT: HCPCS | Performed by: INTERNAL MEDICINE

## 2024-02-29 PROCEDURE — 1123F ACP DISCUSS/DSCN MKR DOCD: CPT | Performed by: INTERNAL MEDICINE

## 2024-02-29 PROCEDURE — G8427 DOCREV CUR MEDS BY ELIG CLIN: HCPCS | Performed by: INTERNAL MEDICINE

## 2024-02-29 PROCEDURE — G8420 CALC BMI NORM PARAMETERS: HCPCS | Performed by: INTERNAL MEDICINE

## 2024-02-29 RX ORDER — UBIDECARENONE 75 MG
100 CAPSULE ORAL DAILY
Qty: 90 TABLET | Refills: 1 | Status: SHIPPED | OUTPATIENT
Start: 2024-02-29 | End: 2024-08-27

## 2024-02-29 NOTE — PROGRESS NOTES
Ebony Pace  1940 02/29/24    SUBJECTIVE:  since COVID 2022 Ebony has noticed more issues w memory, occasional problems.  Forgets some folks names, sometimes names of things.  Dtr Tika is w her today.  Sometimes conversations jump around w/o good flow.  Did have some vol loss w otherwise benign head CT, in 11/2020.    Always has had some issues w grandkids's names but this is near baseline.  No disorientation of place.      In the past yr her b12 and thyroid fxn have been nl range, but b12 low range of nl.  She is ok w #s and handles finances/bills at home.      OBJECTIVE:        Physical Exam  Vitals reviewed.   Constitutional:       Appearance: She is well-developed.   HENT:      Head: Normocephalic and atraumatic.   Eyes:      General: No scleral icterus.        Right eye: No discharge.         Left eye: No discharge.      Conjunctiva/sclera: Conjunctivae normal.      Pupils: Pupils are equal, round, and reactive to light.   Neck:      Thyroid: No thyromegaly.      Vascular: No JVD.      Trachea: No tracheal deviation.   Cardiovascular:      Rate and Rhythm: Normal rate and regular rhythm.      Heart sounds: Normal heart sounds. No murmur heard.     No friction rub. No gallop.   Pulmonary:      Effort: Pulmonary effort is normal. No respiratory distress.      Breath sounds: Normal breath sounds. No wheezing or rales.   Abdominal:      General: Bowel sounds are normal. There is no distension.      Palpations: Abdomen is soft. There is no mass.      Tenderness: There is no abdominal tenderness. There is no guarding or rebound.      Hernia: No hernia is present.   Musculoskeletal:      Cervical back: Neck supple.   Lymphadenopathy:      Cervical: No cervical adenopathy.   Skin:     General: Skin is warm and dry.   Neurological:      General: No focal deficit present.      Mental Status: She is alert and oriented to person, place, and time.      Cranial Nerves: No cranial nerve deficit.      Sensory: No

## 2024-03-12 ENCOUNTER — HOSPITAL ENCOUNTER (OUTPATIENT)
Dept: CT IMAGING | Age: 84
Discharge: HOME | End: 2024-03-12
Attending: INTERNAL MEDICINE
Payer: MEDICARE

## 2024-03-12 DIAGNOSIS — G31.9 BRAIN ATROPHY (HCC): ICD-10-CM

## 2024-03-12 DIAGNOSIS — R41.3 MEMORY DEFICIT: ICD-10-CM

## 2024-03-12 PROCEDURE — 70450 CT HEAD/BRAIN W/O DYE: CPT

## 2024-03-12 NOTE — RESULT ENCOUNTER NOTE
Please call pt, HER BRAIN CT IS STABLE W NO EVIDENCE OF STROKE, HAS SOME NORMAL BRAIN ATROPHY OR SHRINKAGE PRESENT WHICH IS TYPICAL FOR AGING.

## 2024-04-01 ENCOUNTER — OFFICE VISIT (OUTPATIENT)
Dept: INTERNAL MEDICINE CLINIC | Age: 84
End: 2024-04-01
Payer: MEDICARE

## 2024-04-01 VITALS
OXYGEN SATURATION: 96 % | SYSTOLIC BLOOD PRESSURE: 110 MMHG | HEART RATE: 67 BPM | WEIGHT: 132.2 LBS | HEIGHT: 65 IN | BODY MASS INDEX: 22.02 KG/M2 | DIASTOLIC BLOOD PRESSURE: 64 MMHG

## 2024-04-01 DIAGNOSIS — G31.9 BRAIN ATROPHY (HCC): ICD-10-CM

## 2024-04-01 DIAGNOSIS — G31.84 MCI (MILD COGNITIVE IMPAIRMENT): ICD-10-CM

## 2024-04-01 DIAGNOSIS — R41.3 MEMORY DEFICIT: Primary | ICD-10-CM

## 2024-04-01 PROCEDURE — G8399 PT W/DXA RESULTS DOCUMENT: HCPCS | Performed by: INTERNAL MEDICINE

## 2024-04-01 PROCEDURE — 1036F TOBACCO NON-USER: CPT | Performed by: INTERNAL MEDICINE

## 2024-04-01 PROCEDURE — G8420 CALC BMI NORM PARAMETERS: HCPCS | Performed by: INTERNAL MEDICINE

## 2024-04-01 PROCEDURE — 1090F PRES/ABSN URINE INCON ASSESS: CPT | Performed by: INTERNAL MEDICINE

## 2024-04-01 PROCEDURE — 1123F ACP DISCUSS/DSCN MKR DOCD: CPT | Performed by: INTERNAL MEDICINE

## 2024-04-01 PROCEDURE — 99213 OFFICE O/P EST LOW 20 MIN: CPT | Performed by: INTERNAL MEDICINE

## 2024-04-01 PROCEDURE — G8427 DOCREV CUR MEDS BY ELIG CLIN: HCPCS | Performed by: INTERNAL MEDICINE

## 2024-04-01 NOTE — PROGRESS NOTES
Ebony Pace  1940 04/01/24    SUBJECTIVE:    Ebony did fine on SUJATHA test, sometimes has brief lapses in memory, word finding. Is aware when she may misspeak.    ADLs otherwise independent.  Some atrophy noted on head CT.      OBJECTIVE:    /64   Pulse 67   Ht 1.638 m (5' 4.5\")   Wt 60 kg (132 lb 3.2 oz)   SpO2 96%   BMI 22.34 kg/m²     Physical Exam  Constitutional:       Appearance: Normal appearance.   Neurological:      Mental Status: She is alert.   Psychiatric:         Mood and Affect: Mood normal.         ASSESSMENT:    1. Memory deficit    2. Brain atrophy (HCC)    3. MCI (mild cognitive impairment)        PLAN:    Ebony was seen today for 1 month follow-up.    Diagnoses and all orders for this visit:    Memory deficit- CLINICALLY W MCI- AS BELOW.  MONITOR FOR NOW, CONSIDER NEUROLOGY CONSULTATION IF PROGRESSION    Brain atrophy (HCC)- NOTED ON CT, NO EVID OF PRIOR STROKES    MCI (mild cognitive impairment) - Plan as noted

## 2024-04-08 RX ORDER — APIXABAN 5 MG/1
5 TABLET, FILM COATED ORAL 2 TIMES DAILY
Qty: 180 TABLET | Refills: 3 | Status: SHIPPED | OUTPATIENT
Start: 2024-04-08

## 2024-04-22 ENCOUNTER — HOSPITAL ENCOUNTER (OUTPATIENT)
Dept: WOMENS IMAGING | Age: 84
Discharge: HOME | End: 2024-04-22
Payer: MEDICARE

## 2024-04-22 DIAGNOSIS — Z12.31 SCREENING MAMMOGRAM FOR BREAST CANCER: ICD-10-CM

## 2024-04-22 PROCEDURE — 77063 BREAST TOMOSYNTHESIS BI: CPT

## 2024-05-07 DIAGNOSIS — E03.4 HYPOTHYROIDISM DUE TO ACQUIRED ATROPHY OF THYROID: ICD-10-CM

## 2024-05-07 RX ORDER — LEVOTHYROXINE SODIUM 137 MCG
TABLET ORAL
Qty: 90 TABLET | Refills: 1 | Status: SHIPPED | OUTPATIENT
Start: 2024-05-07

## 2024-08-06 SDOH — ECONOMIC STABILITY: TRANSPORTATION INSECURITY
IN THE PAST 12 MONTHS, HAS LACK OF TRANSPORTATION KEPT YOU FROM MEETINGS, WORK, OR FROM GETTING THINGS NEEDED FOR DAILY LIVING?: NO

## 2024-08-06 SDOH — ECONOMIC STABILITY: FOOD INSECURITY: WITHIN THE PAST 12 MONTHS, YOU WORRIED THAT YOUR FOOD WOULD RUN OUT BEFORE YOU GOT MONEY TO BUY MORE.: NEVER TRUE

## 2024-08-06 SDOH — ECONOMIC STABILITY: FOOD INSECURITY: WITHIN THE PAST 12 MONTHS, THE FOOD YOU BOUGHT JUST DIDN'T LAST AND YOU DIDN'T HAVE MONEY TO GET MORE.: NEVER TRUE

## 2024-08-06 SDOH — ECONOMIC STABILITY: INCOME INSECURITY: HOW HARD IS IT FOR YOU TO PAY FOR THE VERY BASICS LIKE FOOD, HOUSING, MEDICAL CARE, AND HEATING?: NOT HARD AT ALL

## 2024-08-08 ENCOUNTER — HOSPITAL ENCOUNTER (OUTPATIENT)
Age: 84
Discharge: HOME | End: 2024-08-08
Payer: MEDICARE

## 2024-08-08 DIAGNOSIS — E55.9 VITAMIN D DEFICIENCY: ICD-10-CM

## 2024-08-08 DIAGNOSIS — I48.0 PAROXYSMAL ATRIAL FIBRILLATION (HCC): ICD-10-CM

## 2024-08-08 DIAGNOSIS — E78.2 MIXED HYPERLIPIDEMIA: ICD-10-CM

## 2024-08-08 DIAGNOSIS — E03.4 HYPOTHYROIDISM DUE TO ACQUIRED ATROPHY OF THYROID: ICD-10-CM

## 2024-08-08 DIAGNOSIS — E53.8 B12 DEFICIENCY: ICD-10-CM

## 2024-08-08 LAB
25(OH)D3 SERPL-MCNC: 49.25 NG/ML
ALBUMIN SERPL-MCNC: 4 GM/DL (ref 3.4–5)
ALP BLD-CCNC: 82 IU/L (ref 40–129)
ALT SERPL-CCNC: 14 U/L (ref 10–40)
ANION GAP SERPL CALCULATED.3IONS-SCNC: 10 MMOL/L (ref 7–16)
AST SERPL-CCNC: 17 IU/L (ref 15–37)
BILIRUB SERPL-MCNC: 0.4 MG/DL (ref 0–1)
BUN SERPL-MCNC: 23 MG/DL (ref 6–23)
CALCIUM SERPL-MCNC: 9.5 MG/DL (ref 8.3–10.6)
CHLORIDE BLD-SCNC: 102 MMOL/L (ref 99–110)
CHOLEST SERPL-MCNC: 190 MG/DL
CO2: 27 MMOL/L (ref 21–32)
CREAT SERPL-MCNC: 0.9 MG/DL (ref 0.6–1.1)
GFR, ESTIMATED: 62 ML/MIN/1.73M2
GLUCOSE SERPL-MCNC: 106 MG/DL (ref 70–99)
HCT VFR BLD CALC: 40.4 % (ref 37–47)
HDLC SERPL-MCNC: 58 MG/DL
HEMOGLOBIN: 13 GM/DL (ref 12.5–16)
LDLC SERPL CALC-MCNC: 104 MG/DL
MAGNESIUM: 2 MG/DL (ref 1.8–2.4)
MCH RBC QN AUTO: 30.5 PG (ref 27–31)
MCHC RBC AUTO-ENTMCNC: 32.2 % (ref 32–36)
MCV RBC AUTO: 94.8 FL (ref 78–100)
PDW BLD-RTO: 12.5 % (ref 11.7–14.9)
PLATELET # BLD: 150 K/CU MM (ref 140–440)
PMV BLD AUTO: 10 FL (ref 7.5–11.1)
POTASSIUM SERPL-SCNC: 4.2 MMOL/L (ref 3.5–5.1)
RBC # BLD: 4.26 M/CU MM (ref 4.2–5.4)
SODIUM BLD-SCNC: 139 MMOL/L (ref 135–145)
T4 FREE SERPL-MCNC: 1.32 NG/DL (ref 0.9–1.8)
TOTAL PROTEIN: 6.8 GM/DL (ref 6.4–8.2)
TRIGL SERPL-MCNC: 138 MG/DL
TSH SERPL DL<=0.005 MIU/L-ACNC: 3.77 UIU/ML (ref 0.27–4.2)
VITAMIN B-12: 564.7 PG/ML (ref 211–911)
WBC # BLD: 4.2 K/CU MM (ref 4–10.5)

## 2024-08-08 PROCEDURE — 84443 ASSAY THYROID STIM HORMONE: CPT

## 2024-08-08 PROCEDURE — 85027 COMPLETE CBC AUTOMATED: CPT

## 2024-08-08 PROCEDURE — 80061 LIPID PANEL: CPT

## 2024-08-08 PROCEDURE — 80053 COMPREHEN METABOLIC PANEL: CPT

## 2024-08-08 PROCEDURE — 82306 VITAMIN D 25 HYDROXY: CPT

## 2024-08-08 PROCEDURE — 84439 ASSAY OF FREE THYROXINE: CPT

## 2024-08-08 PROCEDURE — 82607 VITAMIN B-12: CPT

## 2024-08-08 PROCEDURE — 83735 ASSAY OF MAGNESIUM: CPT

## 2024-08-09 ENCOUNTER — OFFICE VISIT (OUTPATIENT)
Dept: INTERNAL MEDICINE CLINIC | Age: 84
End: 2024-08-09

## 2024-08-09 VITALS
HEIGHT: 64 IN | OXYGEN SATURATION: 98 % | WEIGHT: 139.6 LBS | BODY MASS INDEX: 23.83 KG/M2 | DIASTOLIC BLOOD PRESSURE: 60 MMHG | SYSTOLIC BLOOD PRESSURE: 120 MMHG | HEART RATE: 64 BPM

## 2024-08-09 DIAGNOSIS — K44.9 HIATAL HERNIA: ICD-10-CM

## 2024-08-09 DIAGNOSIS — G31.84 MCI (MILD COGNITIVE IMPAIRMENT): ICD-10-CM

## 2024-08-09 DIAGNOSIS — E03.4 HYPOTHYROIDISM DUE TO ACQUIRED ATROPHY OF THYROID: ICD-10-CM

## 2024-08-09 DIAGNOSIS — I48.0 PAROXYSMAL ATRIAL FIBRILLATION (HCC): ICD-10-CM

## 2024-08-09 DIAGNOSIS — E78.2 MIXED HYPERLIPIDEMIA: ICD-10-CM

## 2024-08-09 DIAGNOSIS — R07.9 CHEST PAIN, UNSPECIFIED TYPE: Primary | ICD-10-CM

## 2024-08-09 DIAGNOSIS — I20.0: ICD-10-CM

## 2024-08-09 RX ORDER — LEVOTHYROXINE SODIUM 137 MCG
137 TABLET ORAL DAILY
Qty: 90 TABLET | Refills: 1 | Status: SHIPPED | OUTPATIENT
Start: 2024-08-09 | End: 2025-02-05

## 2024-08-09 NOTE — PROGRESS NOTES
Ebony Pace  1940 08/09/24    SUBJECTIVE:    Atypical chest pain- had been seen by cardiology and dx'd w pre infarction syndrome, some exertional SOB and CP noted, stress test recommended but not yet done, she wants this in [facility] and we'll go ahead and order this    Atr fib,no palpitations, stable on meds.      Cardio notes 8/6: Macarena Guillaume NP  Assessment and Plan  In summary, Ebony Pace is a 84 y.o. female with      1. Paroxysmal atrial fibrillation (CMS/HCC)  She has had paroxysmal atrial fibrillation, with minimal symptoms.  Given her age, she is not a great candidate for ablation.  She did not tolerate Toprol-XL previously.  She is currently on Rythmol 225 mg 3 times per day along with Eliquis 5 mg twice per day.  She has done well on this regimen and is maintaining sinus rhythm.  She has a KTE3JE1-JDHv score of at least 3. I've made no changes today. We will continue to see her in yearly intervals, unless needed sooner.  - ECG 12 lead ([facility])     2. Family history of hypercoagulable state  She has MTHFR probable heterozygous defect. She has not had DVT or any arterial thrombi. However this most likely does increase her potential risk for stroke given her atrial arrhythmias. She is chronically anticoagulated with with Eliquis.     3. Gastroesophageal reflux disease without esophagitis  She has been on proton pump inhibitors in the past and has been told that she has a hiatal hernia.     4. Hypothyroidism, unspecified type  On thyroid replacement with levothyroxine 137 mcg daily.     5.  Preinfarction syndrome  She is having episodes of chest pressure about once per week on average.  She is unsure how long these last or if they are related to activity or not.  I have ordered stress testing to rule out ischemia.  She does not think she will be able to do a treadmill test.     -- --   Hypothyroid has been asymptomatic w/o sx of fatigue, constipation, cold intolerance, depression.    GERD:  Is without sx of

## 2024-08-21 ENCOUNTER — TELEPHONE (OUTPATIENT)
Dept: CARDIOLOGY CLINIC | Age: 84
End: 2024-08-21

## 2024-08-21 NOTE — TELEPHONE ENCOUNTER
Pt is having a NM Stress test done at 11:00am on 08/22/24. Pt took her eliquis at 7am this morning.  Will this interfere with her results of the stress test? Please call the patient back and advise.

## 2024-10-09 DIAGNOSIS — I48.0 PAROXYSMAL ATRIAL FIBRILLATION (HCC): ICD-10-CM

## 2024-10-09 RX ORDER — PROPAFENONE HYDROCHLORIDE 225 MG/1
225 TABLET, FILM COATED ORAL 2 TIMES DAILY
Qty: 180 TABLET | Refills: 1 | Status: SHIPPED | OUTPATIENT
Start: 2024-10-09

## 2025-01-25 ENCOUNTER — APPOINTMENT (OUTPATIENT)
Dept: CT IMAGING | Age: 85
End: 2025-01-25
Payer: MEDICARE

## 2025-01-25 ENCOUNTER — HOSPITAL ENCOUNTER (OUTPATIENT)
Age: 85
Setting detail: OBSERVATION
Discharge: HOME | End: 2025-01-27
Attending: EMERGENCY MEDICINE | Admitting: STUDENT IN AN ORGANIZED HEALTH CARE EDUCATION/TRAINING PROGRAM
Payer: MEDICARE

## 2025-01-25 DIAGNOSIS — E86.0 DEHYDRATION: ICD-10-CM

## 2025-01-25 DIAGNOSIS — R19.7 DIARRHEA, UNSPECIFIED TYPE: ICD-10-CM

## 2025-01-25 DIAGNOSIS — R74.8 ELEVATED LIPASE: ICD-10-CM

## 2025-01-25 DIAGNOSIS — R10.84 GENERALIZED ABDOMINAL PAIN: ICD-10-CM

## 2025-01-25 DIAGNOSIS — R11.2 NAUSEA AND VOMITING, UNSPECIFIED VOMITING TYPE: Primary | ICD-10-CM

## 2025-01-25 LAB
ALBUMIN SERPL-MCNC: 4.2 G/DL (ref 3.4–5)
ALBUMIN/GLOB SERPL: 1.6 {RATIO} (ref 1.1–2.2)
ALP SERPL-CCNC: 78 U/L (ref 40–129)
ALT SERPL-CCNC: 16 U/L (ref 10–40)
ANION GAP SERPL CALCULATED.3IONS-SCNC: 10 MMOL/L (ref 9–17)
AST SERPL-CCNC: 24 U/L (ref 15–37)
BASOPHILS # BLD: 0.03 K/UL
BASOPHILS NFR BLD: 0 % (ref 0–1)
BILIRUB SERPL-MCNC: 0.6 MG/DL (ref 0–1)
BILIRUB UR QL STRIP: NEGATIVE
BUN SERPL-MCNC: 28 MG/DL (ref 7–20)
CALCIUM SERPL-MCNC: 8.8 MG/DL (ref 8.3–10.6)
CHLORIDE SERPL-SCNC: 108 MMOL/L (ref 99–110)
CLARITY UR: CLEAR
CO2 SERPL-SCNC: 22 MMOL/L (ref 21–32)
COLOR UR: YELLOW
CREAT SERPL-MCNC: 0.9 MG/DL (ref 0.6–1.2)
EOSINOPHIL # BLD: 0 K/UL
EOSINOPHILS RELATIVE PERCENT: 0 % (ref 0–3)
ERYTHROCYTE [DISTWIDTH] IN BLOOD BY AUTOMATED COUNT: 12.1 % (ref 11.7–14.9)
GFR, ESTIMATED: 56 ML/MIN/1.73M2
GLUCOSE SERPL-MCNC: 114 MG/DL (ref 74–99)
GLUCOSE UR STRIP-MCNC: NEGATIVE MG/DL
HCT VFR BLD AUTO: 46.5 % (ref 37–47)
HGB BLD-MCNC: 15.1 G/DL (ref 12.5–16)
HGB UR QL STRIP.AUTO: ABNORMAL
IMM GRANULOCYTES # BLD AUTO: 0.03 K/UL
IMM GRANULOCYTES NFR BLD: 0 %
KETONES UR STRIP-MCNC: NEGATIVE MG/DL
LEUKOCYTE ESTERASE UR QL STRIP: ABNORMAL
LIPASE SERPL-CCNC: 80 U/L (ref 13–60)
LYMPHOCYTES NFR BLD: 0.73 K/UL
LYMPHOCYTES RELATIVE PERCENT: 7 % (ref 24–44)
MCH RBC QN AUTO: 30.7 PG (ref 27–31)
MCHC RBC AUTO-ENTMCNC: 32.5 G/DL (ref 32–36)
MCV RBC AUTO: 94.5 FL (ref 78–100)
MONOCYTES NFR BLD: 0.73 K/UL
MONOCYTES NFR BLD: 7 % (ref 0–4)
NEUTROPHILS NFR BLD: 85 % (ref 36–66)
NEUTS SEG NFR BLD: 8.51 K/UL
NITRITE UR QL STRIP: NEGATIVE
PH UR STRIP: 5 [PH] (ref 5–8)
PLATELET # BLD AUTO: 163 K/UL (ref 140–440)
PMV BLD AUTO: 9.7 FL (ref 7.5–11.1)
POTASSIUM SERPL-SCNC: 4.5 MMOL/L (ref 3.5–5.1)
PROT SERPL-MCNC: 6.8 G/DL (ref 6.4–8.2)
PROT UR STRIP-MCNC: NEGATIVE MG/DL
RBC # BLD AUTO: 4.92 M/UL (ref 4.2–5.4)
SODIUM SERPL-SCNC: 140 MMOL/L (ref 136–145)
SP GR UR STRIP: 1.02 (ref 1–1.03)
UROBILINOGEN UR STRIP-ACNC: 0.2 EU/DL (ref 0–1)
WBC OTHER # BLD: 10 K/UL (ref 4–10.5)

## 2025-01-25 PROCEDURE — 80053 COMPREHEN METABOLIC PANEL: CPT

## 2025-01-25 PROCEDURE — 36415 COLL VENOUS BLD VENIPUNCTURE: CPT

## 2025-01-25 PROCEDURE — 99285 EMERGENCY DEPT VISIT HI MDM: CPT

## 2025-01-25 PROCEDURE — 2580000003 HC RX 258: Performed by: EMERGENCY MEDICINE

## 2025-01-25 PROCEDURE — G0378 HOSPITAL OBSERVATION PER HR: HCPCS

## 2025-01-25 PROCEDURE — 96374 THER/PROPH/DIAG INJ IV PUSH: CPT

## 2025-01-25 PROCEDURE — 6360000002 HC RX W HCPCS: Performed by: EMERGENCY MEDICINE

## 2025-01-25 PROCEDURE — 83690 ASSAY OF LIPASE: CPT

## 2025-01-25 PROCEDURE — 74177 CT ABD & PELVIS W/CONTRAST: CPT

## 2025-01-25 PROCEDURE — 2580000003 HC RX 258: Performed by: STUDENT IN AN ORGANIZED HEALTH CARE EDUCATION/TRAINING PROGRAM

## 2025-01-25 PROCEDURE — 96361 HYDRATE IV INFUSION ADD-ON: CPT

## 2025-01-25 PROCEDURE — 81001 URINALYSIS AUTO W/SCOPE: CPT

## 2025-01-25 PROCEDURE — 85025 COMPLETE CBC W/AUTO DIFF WBC: CPT

## 2025-01-25 PROCEDURE — 6360000004 HC RX CONTRAST MEDICATION: Performed by: EMERGENCY MEDICINE

## 2025-01-25 RX ORDER — 0.9 % SODIUM CHLORIDE 0.9 %
500 INTRAVENOUS SOLUTION INTRAVENOUS ONCE
Status: COMPLETED | OUTPATIENT
Start: 2025-01-25 | End: 2025-01-25

## 2025-01-25 RX ORDER — SODIUM CHLORIDE 0.9 % (FLUSH) 0.9 %
5-40 SYRINGE (ML) INJECTION PRN
Status: DISCONTINUED | OUTPATIENT
Start: 2025-01-25 | End: 2025-01-27 | Stop reason: HOSPADM

## 2025-01-25 RX ORDER — ONDANSETRON 2 MG/ML
4 INJECTION INTRAMUSCULAR; INTRAVENOUS EVERY 6 HOURS PRN
Status: DISCONTINUED | OUTPATIENT
Start: 2025-01-25 | End: 2025-01-27 | Stop reason: HOSPADM

## 2025-01-25 RX ORDER — ONDANSETRON 2 MG/ML
4 INJECTION INTRAMUSCULAR; INTRAVENOUS ONCE
Status: COMPLETED | OUTPATIENT
Start: 2025-01-25 | End: 2025-01-25

## 2025-01-25 RX ORDER — CYANOCOBALAMIN 1000 UG/ML
100 INJECTION, SOLUTION INTRAMUSCULAR; SUBCUTANEOUS DAILY
Status: DISCONTINUED | OUTPATIENT
Start: 2025-01-26 | End: 2025-01-27 | Stop reason: HOSPADM

## 2025-01-25 RX ORDER — POTASSIUM CHLORIDE 7.45 MG/ML
10 INJECTION INTRAVENOUS PRN
Status: DISCONTINUED | OUTPATIENT
Start: 2025-01-25 | End: 2025-01-27 | Stop reason: HOSPADM

## 2025-01-25 RX ORDER — IOPAMIDOL 755 MG/ML
75 INJECTION, SOLUTION INTRAVASCULAR
Status: COMPLETED | OUTPATIENT
Start: 2025-01-25 | End: 2025-01-25

## 2025-01-25 RX ORDER — SODIUM CHLORIDE 9 MG/ML
INJECTION, SOLUTION INTRAVENOUS PRN
Status: DISCONTINUED | OUTPATIENT
Start: 2025-01-25 | End: 2025-01-27 | Stop reason: HOSPADM

## 2025-01-25 RX ORDER — MAGNESIUM SULFATE IN WATER 40 MG/ML
2000 INJECTION, SOLUTION INTRAVENOUS PRN
Status: DISCONTINUED | OUTPATIENT
Start: 2025-01-25 | End: 2025-01-27 | Stop reason: HOSPADM

## 2025-01-25 RX ORDER — PROPAFENONE HYDROCHLORIDE 150 MG/1
225 TABLET, COATED ORAL 2 TIMES DAILY
Status: DISCONTINUED | OUTPATIENT
Start: 2025-01-26 | End: 2025-01-27 | Stop reason: HOSPADM

## 2025-01-25 RX ORDER — SODIUM CHLORIDE, SODIUM LACTATE, POTASSIUM CHLORIDE, CALCIUM CHLORIDE 600; 310; 30; 20 MG/100ML; MG/100ML; MG/100ML; MG/100ML
INJECTION, SOLUTION INTRAVENOUS CONTINUOUS
Status: ACTIVE | OUTPATIENT
Start: 2025-01-25 | End: 2025-01-26

## 2025-01-25 RX ORDER — VITAMIN B COMPLEX
1000 TABLET ORAL DAILY
Status: DISCONTINUED | OUTPATIENT
Start: 2025-01-26 | End: 2025-01-27 | Stop reason: HOSPADM

## 2025-01-25 RX ORDER — SODIUM CHLORIDE 0.9 % (FLUSH) 0.9 %
5-40 SYRINGE (ML) INJECTION 2 TIMES DAILY
Status: DISCONTINUED | OUTPATIENT
Start: 2025-01-25 | End: 2025-01-27 | Stop reason: HOSPADM

## 2025-01-25 RX ORDER — ONDANSETRON 4 MG/1
4 TABLET, ORALLY DISINTEGRATING ORAL EVERY 8 HOURS PRN
Status: DISCONTINUED | OUTPATIENT
Start: 2025-01-25 | End: 2025-01-27 | Stop reason: HOSPADM

## 2025-01-25 RX ORDER — CALCIUM CARBONATE 500(1250)
500 TABLET ORAL DAILY
Status: DISCONTINUED | OUTPATIENT
Start: 2025-01-26 | End: 2025-01-27 | Stop reason: HOSPADM

## 2025-01-25 RX ADMIN — SODIUM CHLORIDE 500 ML: 9 INJECTION, SOLUTION INTRAVENOUS at 20:10

## 2025-01-25 RX ADMIN — IOPAMIDOL 75 ML: 755 INJECTION, SOLUTION INTRAVENOUS at 20:17

## 2025-01-25 RX ADMIN — ONDANSETRON 4 MG: 2 INJECTION INTRAMUSCULAR; INTRAVENOUS at 18:06

## 2025-01-25 RX ADMIN — SODIUM CHLORIDE 500 ML: 9 INJECTION, SOLUTION INTRAVENOUS at 18:07

## 2025-01-25 RX ADMIN — SODIUM CHLORIDE, POTASSIUM CHLORIDE, SODIUM LACTATE AND CALCIUM CHLORIDE: 600; 310; 30; 20 INJECTION, SOLUTION INTRAVENOUS at 23:24

## 2025-01-25 ASSESSMENT — PAIN - FUNCTIONAL ASSESSMENT: PAIN_FUNCTIONAL_ASSESSMENT: 0-10

## 2025-01-25 ASSESSMENT — PAIN SCALES - GENERAL
PAINLEVEL_OUTOF10: 0
PAINLEVEL_OUTOF10: 8

## 2025-01-25 NOTE — ED PROVIDER NOTES
EMERGENCY DEPARTMENT ENCOUNTER      CHIEF COMPLAINT:   Nausea and Vomiting  Diarrhea    HPI: Ebony Pace is a 84 y.o. female who presents to the emergency department complaining of nausea, vomiting, and diarrhea with abdominal pain. The patient states that the symptoms started around 2 PM.  She has vomited upwards of 10 times.  She has had several episodes of watery diarrhea. Denies hematemesis, melanotic stools, or bloody stools.  She has generalized cramping abdominal pain.  It is worse in the epigastric region.  She feels dehydrated.  The symptoms have been intermittent. There are no exacerbating or alleviating factors.  She denies any other complaints.    REVIEW OF SYSTEMS:   Constitutional:  Denies fever or chills  Eyes:  Denies change in visual acuity  HENT:  Denies nasal congestion or sore throat  Respiratory:  Denies cough or shortness of breath  Cardiovascular:  Denies chest pain or edema  GI: See HPI  :  Denies dysuria  Musculoskeletal:  Denies back pain or joint pain  Integument:  Denies rash  Neurologic:  Denies headache, focal weakness or sensory changes  \"Remaining review of systems reviewed and negative. I have reviewed the nursing triage documentation and agree unless otherwise noted below.\"      PAST MEDICAL HISTORY:   Past Medical History:   Diagnosis Date    AF (atrial fibrillation) ([facility])     seeing Dr Villarrael primarily, sees Dr Carney prn    Allergic rhinitis due to other allergen     Anxiety     Arthritis     Arthritis of big toe 06/2012    Dr Jarvis Qiu- bilateral, treating w orthotics    B12 deficiency     BCC (basal cell carcinoma), face 08/13/2013    L brittany- Dr Jaquez     Carpal tunnel syndrome     Congenital prolapse of bladder mucosa     COVID-19 10/05/2022    DDD (degenerative disc disease), lumbar     Dehydration 10/14/2022    Female incontinence     H/O cardiovascular stress test 1/14/2010, 2/6/2007 1/14/2010-Normal perfusion. Normal LVSF. EF 70%;    H/O echocardiogram 1/7/2010,

## 2025-01-26 LAB
ADV 40+41 DNA STL QL NAA+NON-PROBE: NOT DETECTED
ALBUMIN SERPL-MCNC: 3.4 G/DL (ref 3.4–5)
ALBUMIN/GLOB SERPL: 1.5 {RATIO} (ref 1.1–2.2)
ALP SERPL-CCNC: 62 U/L (ref 40–129)
ALT SERPL-CCNC: 11 U/L (ref 10–40)
ANION GAP SERPL CALCULATED.3IONS-SCNC: 11 MMOL/L (ref 9–17)
AST SERPL-CCNC: 21 U/L (ref 15–37)
BASOPHILS # BLD: 0.01 K/UL
BASOPHILS NFR BLD: 0 % (ref 0–1)
BILIRUB SERPL-MCNC: 0.6 MG/DL (ref 0–1)
BUN SERPL-MCNC: 24 MG/DL (ref 7–20)
C CAYETANENSIS DNA STL QL NAA+NON-PROBE: NOT DETECTED
C COLI+JEJ+UPSA DNA STL QL NAA+NON-PROBE: NOT DETECTED
C DIFF GDH + TOXINS A+B STL QL IA.RAPID: NEGATIVE
CALCIUM SERPL-MCNC: 8.1 MG/DL (ref 8.3–10.6)
CHARACTER UR: ABNORMAL
CHLORIDE SERPL-SCNC: 106 MMOL/L (ref 99–110)
CO2 SERPL-SCNC: 20 MMOL/L (ref 21–32)
CREAT SERPL-MCNC: 0.8 MG/DL (ref 0.6–1.2)
CRYPTOSP DNA STL QL NAA+NON-PROBE: NOT DETECTED
E COLI O157 DNA STL QL NAA+NON-PROBE: NOT DETECTED
E HISTOLYT DNA STL QL NAA+NON-PROBE: NOT DETECTED
EAEC PAA PLAS AGGR+AATA ST NAA+NON-PRB: NOT DETECTED
EC STX1+STX2 GENES STL QL NAA+NON-PROBE: NOT DETECTED
EOSINOPHIL # BLD: 0 K/UL
EOSINOPHILS RELATIVE PERCENT: 0 % (ref 0–3)
EPEC EAE GENE STL QL NAA+NON-PROBE: NOT DETECTED
EPI CELLS #/AREA URNS HPF: 5 /HPF
ERYTHROCYTE [DISTWIDTH] IN BLOOD BY AUTOMATED COUNT: 12.2 % (ref 11.7–14.9)
ETEC LTA+ST1A+ST1B TOX ST NAA+NON-PROBE: NOT DETECTED
FOLATE SERPL-MCNC: 16.3 NG/ML (ref 4.8–24.2)
G LAMBLIA DNA STL QL NAA+NON-PROBE: NOT DETECTED
GFR, ESTIMATED: 70 ML/MIN/1.73M2
GI PATH DNA+RNA PNL STL NAA+NON-PROBE: NOT DETECTED
GLUCOSE SERPL-MCNC: 116 MG/DL (ref 74–99)
HCT VFR BLD AUTO: 39.7 % (ref 37–47)
HGB BLD-MCNC: 13.1 G/DL (ref 12.5–16)
IMM GRANULOCYTES # BLD AUTO: 0.04 K/UL
IMM GRANULOCYTES NFR BLD: 1 %
INR PPP: 1.3
LYMPHOCYTES NFR BLD: 0.64 K/UL
LYMPHOCYTES RELATIVE PERCENT: 10 % (ref 24–44)
MCH RBC QN AUTO: 31.1 PG (ref 27–31)
MCHC RBC AUTO-ENTMCNC: 33 G/DL (ref 32–36)
MCV RBC AUTO: 94.3 FL (ref 78–100)
MONOCYTES NFR BLD: 0.24 K/UL
MONOCYTES NFR BLD: 4 % (ref 0–4)
MUCOUS THREADS URNS QL MICRO: ABNORMAL
NEUTROPHILS NFR BLD: 85 % (ref 36–66)
NEUTS SEG NFR BLD: 5.29 K/UL
NOROVIRUS GI+II RNA STL QL NAA+NON-PROBE: DETECTED
P SHIGELLOIDES DNA STL QL NAA+NON-PROBE: NOT DETECTED
PLATELET, FLUORESCENCE: 140 K/UL (ref 140–440)
PMV BLD AUTO: 9.6 FL (ref 7.5–11.1)
POTASSIUM SERPL-SCNC: 3.7 MMOL/L (ref 3.5–5.1)
PROT SERPL-MCNC: 5.8 G/DL (ref 6.4–8.2)
PROTHROMBIN TIME: 16.4 SEC (ref 11.7–14.5)
RBC # BLD AUTO: 4.21 M/UL (ref 4.2–5.4)
RBC #/AREA URNS HPF: 7 /HPF (ref 0–2)
RVA RNA STL QL NAA+NON-PROBE: NOT DETECTED
S ENT+BONG DNA STL QL NAA+NON-PROBE: NOT DETECTED
SAPO I+II+IV+V RNA STL QL NAA+NON-PROBE: NOT DETECTED
SODIUM SERPL-SCNC: 136 MMOL/L (ref 136–145)
SOURCE: ABNORMAL
SPECIMEN DESCRIPTION: NORMAL
TSH SERPL DL<=0.05 MIU/L-ACNC: 3.04 UIU/ML (ref 0.27–4.2)
V CHOL+PARA+VUL DNA STL QL NAA+NON-PROBE: NOT DETECTED
V CHOLERAE DNA STL QL NAA+NON-PROBE: NOT DETECTED
VIT B12 SERPL-MCNC: 546 PG/ML (ref 211–911)
WBC #/AREA URNS HPF: 3 /HPF (ref 0–5)
WBC OTHER # BLD: 6.2 K/UL (ref 4–10.5)
Y ENTEROCOL DNA STL QL NAA+NON-PROBE: NOT DETECTED
YEAST URNS QL MICRO: ABNORMAL

## 2025-01-26 PROCEDURE — 94761 N-INVAS EAR/PLS OXIMETRY MLT: CPT

## 2025-01-26 PROCEDURE — 96372 THER/PROPH/DIAG INJ SC/IM: CPT

## 2025-01-26 PROCEDURE — 6360000002 HC RX W HCPCS: Performed by: STUDENT IN AN ORGANIZED HEALTH CARE EDUCATION/TRAINING PROGRAM

## 2025-01-26 PROCEDURE — 2500000003 HC RX 250 WO HCPCS: Performed by: STUDENT IN AN ORGANIZED HEALTH CARE EDUCATION/TRAINING PROGRAM

## 2025-01-26 PROCEDURE — G0378 HOSPITAL OBSERVATION PER HR: HCPCS

## 2025-01-26 PROCEDURE — 6370000000 HC RX 637 (ALT 250 FOR IP): Performed by: NURSE PRACTITIONER

## 2025-01-26 PROCEDURE — 6370000000 HC RX 637 (ALT 250 FOR IP): Performed by: STUDENT IN AN ORGANIZED HEALTH CARE EDUCATION/TRAINING PROGRAM

## 2025-01-26 PROCEDURE — 2580000003 HC RX 258: Performed by: NURSE PRACTITIONER

## 2025-01-26 PROCEDURE — 96361 HYDRATE IV INFUSION ADD-ON: CPT

## 2025-01-26 RX ORDER — SODIUM CHLORIDE, SODIUM LACTATE, POTASSIUM CHLORIDE, CALCIUM CHLORIDE 600; 310; 30; 20 MG/100ML; MG/100ML; MG/100ML; MG/100ML
INJECTION, SOLUTION INTRAVENOUS CONTINUOUS
Status: DISCONTINUED | OUTPATIENT
Start: 2025-01-26 | End: 2025-01-27 | Stop reason: HOSPADM

## 2025-01-26 RX ORDER — ACETAMINOPHEN 325 MG/1
650 TABLET ORAL EVERY 4 HOURS PRN
Status: DISCONTINUED | OUTPATIENT
Start: 2025-01-26 | End: 2025-01-27 | Stop reason: HOSPADM

## 2025-01-26 RX ADMIN — APIXABAN 5 MG: 5 TABLET, FILM COATED ORAL at 21:47

## 2025-01-26 RX ADMIN — SODIUM CHLORIDE, PRESERVATIVE FREE 10 ML: 5 INJECTION INTRAVENOUS at 21:47

## 2025-01-26 RX ADMIN — SODIUM CHLORIDE, POTASSIUM CHLORIDE, SODIUM LACTATE AND CALCIUM CHLORIDE: 600; 310; 30; 20 INJECTION, SOLUTION INTRAVENOUS at 21:46

## 2025-01-26 RX ADMIN — ACETAMINOPHEN 650 MG: 325 TABLET ORAL at 15:16

## 2025-01-26 RX ADMIN — PROPAFENONE HYDROCHLORIDE 225 MG: 150 TABLET, FILM COATED ORAL at 00:03

## 2025-01-26 RX ADMIN — PROPAFENONE HYDROCHLORIDE 225 MG: 150 TABLET, FILM COATED ORAL at 21:47

## 2025-01-26 RX ADMIN — CYANOCOBALAMIN 100 MCG: 1000 INJECTION, SOLUTION INTRAMUSCULAR; SUBCUTANEOUS at 08:53

## 2025-01-26 RX ADMIN — LEVOTHYROXINE SODIUM 137 MCG: 25 TABLET ORAL at 06:06

## 2025-01-26 RX ADMIN — APIXABAN 5 MG: 5 TABLET, FILM COATED ORAL at 00:02

## 2025-01-26 RX ADMIN — SODIUM CHLORIDE, PRESERVATIVE FREE 10 ML: 5 INJECTION INTRAVENOUS at 00:06

## 2025-01-26 RX ADMIN — Medication 1000 UNITS: at 08:52

## 2025-01-26 RX ADMIN — SODIUM CHLORIDE, PRESERVATIVE FREE 10 ML: 5 INJECTION INTRAVENOUS at 08:55

## 2025-01-26 RX ADMIN — APIXABAN 5 MG: 5 TABLET, FILM COATED ORAL at 08:52

## 2025-01-26 RX ADMIN — CALCIUM 500 MG: 500 TABLET ORAL at 08:52

## 2025-01-26 RX ADMIN — PROPAFENONE HYDROCHLORIDE 225 MG: 150 TABLET, FILM COATED ORAL at 09:06

## 2025-01-26 ASSESSMENT — PAIN DESCRIPTION - LOCATION: LOCATION: HEAD

## 2025-01-26 ASSESSMENT — PAIN DESCRIPTION - DESCRIPTORS: DESCRIPTORS: ACHING

## 2025-01-26 ASSESSMENT — PAIN SCALES - GENERAL
PAINLEVEL_OUTOF10: 6
PAINLEVEL_OUTOF10: 3

## 2025-01-26 ASSESSMENT — PAIN - FUNCTIONAL ASSESSMENT: PAIN_FUNCTIONAL_ASSESSMENT: PREVENTS OR INTERFERES WITH MANY ACTIVE NOT PASSIVE ACTIVITIES

## 2025-01-26 NOTE — PROGRESS NOTES
ED TO INPATIENT SBAR HANDOFF    Patient Name: Ebony Pace   :  1940  84 y.o.   Preferred Name  Ebony   Family/Caregiver Present yes   Restraints no   C-SSRS: Risk of Suicide: No Risk  Sitter no   Sepsis Risk Score        Situation  Chief Complaint   Patient presents with    Vomiting    Diarrhea     Pt stated she started vomiting and having diarrhea at approx 2pm. Daughter called EMS due to her being worried about a low sodium level.      Brief Description of Patient's Condition: Ebony Pace is a 84 y.o. female who presents to the emergency department complaining of nausea, vomiting, and diarrhea. The patient states that the symptoms started yesterday. Patient has vomited 3 times and had 2 episodes of diarrhea. Denies hematemesis, melanotic stools, or bloody stools. Denies associated abdominal pain or constipation. The symptoms have been intermittent. There are no exacerbating or alleviating factors. *   Mental Status: oriented and alert  Arrived from: home    Imaging:   CT ABDOMEN PELVIS W IV CONTRAST Additional Contrast? None   Final Result        Abnormal labs:   Abnormal Labs Reviewed   CBC WITH AUTO DIFFERENTIAL - Abnormal; Notable for the following components:       Result Value    Neutrophils % 85 (*)     Lymphocytes % 7 (*)     Monocytes % 7 (*)     All other components within normal limits   COMPREHENSIVE METABOLIC PANEL - Abnormal; Notable for the following components:    Glucose 114 (*)     BUN 28 (*)     Est, Glom Filt Rate 56 (*)     All other components within normal limits   LIPASE - Abnormal; Notable for the following components:    Lipase 80 (*)     All other components within normal limits        Background  History:   Past Medical History:   Diagnosis Date    AF (atrial fibrillation) (HCC)     seeing Dr Villarreal primarily, sees Dr Carney prsommer    Allergic rhinitis due to other allergen     Anxiety     Arthritis     Arthritis of big toe 2012    Dr Jarvis Qiu- [facility], treating w orthotics

## 2025-01-26 NOTE — PROGRESS NOTES
V2.0    [facility] Progress Note      Name:  Ebony Pace /Age/Sex: 1940  (84 y.o. female)   MRN & CSN:  [id number] & [id number] Encounter Date/Time: 2025 2:34 PM EST   Location:  [address] [place] [address] PCP: Elijah Coyne MD     Attending:Mauro Bui MD       Hospital Day: 2    Assessment and Recommendations   Ebony Pace is a 84 y.o. female who presents with Acute diarrhea      Plan:     Intractable nausea vomiting  PRN antiemetics  Tolerating oral intake.  Advance diet as tolerated  Dispo: Hopefully discharge next 24 hours      Acute diarrhea presumed infectious  Norovirus-positive on GI PCR  -C. difficile negative  -GI PCR positive  -PRN symptom control   -Maintain hydration  -Monitor electrolytes    Paroxysmal atrial fibrillation  - Continue Eliquis and Rythmol.     Acquired hypothyroidism  - Continue Synthroid.  - Follow-up repeat TSH.     Hx of VB12 deficiency   - Continue daily supplements.      Diet ADULT DIET; Regular   DVT Prophylaxis [] Lovenox, []  Heparin, [] SCDs, [] Ambulation,  [x] Eliquis, [] Xarelto  [] Coumadin   Code Status Full Code   Disposition From: Home  Expected Disposition: Home  Estimated Date of Discharge:   Patient requires continued admission due to above   Surrogate Decision Maker/ POA Spouse     Personally reviewed Lab Studies and Imaging         Subjective:     Chief Complaint: Intractable nausea vomiting/diarrhea    Ebony Pace is a 84 y.o. female who presents with above symptoms.  Reports noticing improved today and consider discharge.  Has overall generalized weakness  Family bedside hesitant for patient to go home expressing concerns to nursing about ability to care for self.    Review of Systems:      Pertinent positives and negatives discussed in HPI    Objective:     Intake/Output Summary (Last 24 hours) at 2025 1434  Last data filed at 2025 0650  Gross per 24 hour   Intake 1336.26 ml   Output 850 ml   Net 486.26 ml      Vitals:   Vitals:

## 2025-01-26 NOTE — H&P
History and Physical      Name:  Ebony Pace /Age/Sex: 1940  (84 y.o. female)   MRN & CSN:  [id number] & [id number] Encounter Date/Time: 2025 9:35 PM   Location:  ED27/ED-27 PCP: Elijah Coyne MD       Hospital Day: 1    Assessment and Plan:     Patient is a 84 y.o. female who presented with vomiting.     # Intractable nausea and vomiting with acute non-bloody diarrhea   - Reported having persistent NBNB emesis and non-bloody diarrhea over past week (has over 6-8 BM/s day). No previous episodes, recent antibiotics, change in diet or travel, however, adult son had similar symptoms about 10 days prior. No fevers or abdominal pain.   - Clinically hypovolemic, afebrile, no leukocytosis (0/4 SIRS criteria), electrolytes unremarkable, CT showing fluid-filled stomach, small bowel and proximal colon with tiny hiatal hernia.   - Clinically no evidence of ileus, elevated lipase from emesis, no evidence of pancreatitis. Continue supportive care with IVF and antiemetics, although likely viral etiology, will rule-out CDI.     # Paroxysmal atrial fibrillation  - Continue Eliquis and Rythmol.     # Acquired hypothyroidism  - Continue Synthroid.  - Follow-up repeat TSH.    # Hx of VB12 deficiency   - Continue daily supplements.    Checklist:  Advanced care planning: full  Diet: regular    VTE ppx: Lovenox    Disposition: place in observation.  Estimated discharge: 1-2 day(s).  Current living situation: home.  Expected disposition: home.    Spoke with ED provider who recommended admission for the patient and I agree with that plan.  Personally reviewed lab studies and imaging.  EKG interpreted personally and results as stated above.  Imaging that was interpreted personally and results as stated above.    History of Present Illness:     Chief Complaint: vomiting    Patient is a 84 y.o. female with a PMHx as above who presented to the ED with having persistent NBNB emesis and non-bloody diarrhea over past week

## 2025-01-26 NOTE — PROGRESS NOTES
4 Eyes Skin Assessment     NAME:  Ebony Pace  YOB: 1940  MEDICAL RECORD NUMBER:  [id number]    The patient is being assessed for  Admission    I agree that at least one RN has performed a thorough Head to Toe Skin Assessment on the patient. ALL assessment sites listed below have been assessed.      Areas assessed by both nurses:    Head, Face, Ears, Shoulders, Back, Chest, Arms, Elbows, Hands, Sacrum. Buttock, Coccyx, Ischium, Legs. Feet and Heels, Under Medical Devices , and Other          Does the Patient have a Wound? No noted wound(s)       Nick Prevention initiated by RN: Yes  Wound Care Orders initiated by RN: No    Pressure Injury (Stage 3,4, Unstageable, DTI, NWPT, and Complex wounds) if present, place Wound referral order by RN under : No    New Ostomies, if present place, Ostomy referral order under : No     Nurse 1 eSignature: Electronically signed by Sharifa Zarco RN on 1/25/25 at 11:37 PM EST    **SHARE this note so that the co-signing nurse can place an eSignature**    Nurse 2 eSignature: Electronically signed by Rose Jackson RN on 1/25/25 at 11:41 PM EST

## 2025-01-27 VITALS
SYSTOLIC BLOOD PRESSURE: 107 MMHG | OXYGEN SATURATION: 94 % | HEART RATE: 66 BPM | BODY MASS INDEX: 22.73 KG/M2 | RESPIRATION RATE: 18 BRPM | DIASTOLIC BLOOD PRESSURE: 52 MMHG | WEIGHT: 144.84 LBS | HEIGHT: 67 IN | TEMPERATURE: 98.7 F

## 2025-01-27 PROCEDURE — 96361 HYDRATE IV INFUSION ADD-ON: CPT

## 2025-01-27 PROCEDURE — G0378 HOSPITAL OBSERVATION PER HR: HCPCS

## 2025-01-27 PROCEDURE — 2500000003 HC RX 250 WO HCPCS: Performed by: STUDENT IN AN ORGANIZED HEALTH CARE EDUCATION/TRAINING PROGRAM

## 2025-01-27 PROCEDURE — 6360000002 HC RX W HCPCS: Performed by: STUDENT IN AN ORGANIZED HEALTH CARE EDUCATION/TRAINING PROGRAM

## 2025-01-27 PROCEDURE — 96372 THER/PROPH/DIAG INJ SC/IM: CPT

## 2025-01-27 PROCEDURE — 6370000000 HC RX 637 (ALT 250 FOR IP): Performed by: STUDENT IN AN ORGANIZED HEALTH CARE EDUCATION/TRAINING PROGRAM

## 2025-01-27 RX ORDER — ONDANSETRON 4 MG/1
4 TABLET, ORALLY DISINTEGRATING ORAL EVERY 8 HOURS PRN
Qty: 15 TABLET | Refills: 0 | Status: SHIPPED | OUTPATIENT
Start: 2025-01-27 | End: 2025-01-29 | Stop reason: SDUPTHER

## 2025-01-27 RX ADMIN — CALCIUM 500 MG: 500 TABLET ORAL at 08:38

## 2025-01-27 RX ADMIN — LEVOTHYROXINE SODIUM 137 MCG: 25 TABLET ORAL at 06:04

## 2025-01-27 RX ADMIN — APIXABAN 5 MG: 5 TABLET, FILM COATED ORAL at 08:38

## 2025-01-27 RX ADMIN — CYANOCOBALAMIN 100 MCG: 1000 INJECTION, SOLUTION INTRAMUSCULAR; SUBCUTANEOUS at 08:38

## 2025-01-27 RX ADMIN — SODIUM CHLORIDE, PRESERVATIVE FREE 10 ML: 5 INJECTION INTRAVENOUS at 08:38

## 2025-01-27 RX ADMIN — Medication 1000 UNITS: at 08:38

## 2025-01-27 RX ADMIN — PROPAFENONE HYDROCHLORIDE 225 MG: 150 TABLET, FILM COATED ORAL at 08:42

## 2025-01-27 ASSESSMENT — PAIN SCALES - GENERAL: PAINLEVEL_OUTOF10: 0

## 2025-01-27 NOTE — PROGRESS NOTES
Outpatient Pharmacy Progress Note for Meds-to-Beds    Total number of Prescriptions Filled: 1    Additional Documentation:  Patient's family member picked-up the medication(s) in the OP Pharmacy      Thank you for letting us serve your patients.  [address] [address]    Phone: [phone]    Fax: [phone]

## 2025-01-28 ENCOUNTER — CARE COORDINATION (OUTPATIENT)
Dept: CASE MANAGEMENT | Age: 85
End: 2025-01-28

## 2025-01-28 ENCOUNTER — TELEPHONE (OUTPATIENT)
Dept: INTERNAL MEDICINE CLINIC | Age: 85
End: 2025-01-28

## 2025-01-28 DIAGNOSIS — R11.2 INTRACTABLE NAUSEA AND VOMITING: Primary | ICD-10-CM

## 2025-01-28 PROCEDURE — 1111F DSCHRG MED/CURRENT MED MERGE: CPT | Performed by: INTERNAL MEDICINE

## 2025-01-28 NOTE — CARE COORDINATION
Care Transitions Note    Initial Call - Call within 2 business days of discharge: Yes    Patient Current Location:  Home: [address] [place] [address] [place]  [place] [address]    Care Transition Nurse contacted the family, Dtr Damaris  by telephone to perform post hospital discharge assessment, verified name and  as identifiers. Provided introduction to self, and explanation of the Care Transition Nurse role.     Patient: Ebony Pace    Patient : 1940   MRN: [id number]    Reason for Admission: DX:intractable N/V  [facility]:-25  Discharge Date: 25  RURS: No data recorded    Last Discharge Facility       Date Complaint Diagnosis Description Type Department Provider    25 Vomiting; Diarrhea Nausea and vomiting, unspecified vomiting type ... ED to Hosp-Admission (Discharged) (ADMITTED) [facility] 4Dina Steinberg MD; Vandana Pavon M...            Was this an external facility discharge? No    Additional needs identified to be addressed with provider   No needs identified             Method of communication with provider: none.    Patients top risk factors for readmission: medical condition-see above    Interventions to address risk factors:   Review of patient management of conditions/medications: see below    Care Summary Note: Spoke w/ Pt daughter Damaris. Pt lives in private residence with  and son.Pt uses no AD to assist w/ ambulation , is not an active . Pt has grab bars, shower chair and raised toilet seat in the home.Family very active in her care. Per daughter she had just finished assisting Pt with a shower at time of call. Pt has had 2 episodes of diarrhea today w/ abdominal cramping , no N/V. No reports of blood in stool.Dtr reports pt has been drinking Pedialyte and eating jello. Pt has HFU w/ PCP on  @10:45 dtr to transport and will request antidiarrhea medication if necessary.Meds reviewed w/ daughter no discrepancies noted. Pt currently uses no outside resources , daughter is

## 2025-01-28 NOTE — PROGRESS NOTES
Discharge Date: 1/27/25     MRN & CSN:  [id number] & [id number] Encounter Date and Time 1/27/25 7:39 PM EST    Attending:  No att. providers found Discharging Provider: RADHA Kurtz - [facility]         Hospital Course:      Brief HPI: Ebony Pace is a 84 y.o. female who presented with intractable nausea vomiting.  She was admitted 11/25/2025 due to concerns for severe diarrhea/nausea vomiting and dehydration signs.  She is on IV positive for norovirus and treated with symptom control.  Subsequently able to tolerate p.o. intake with antiemetics and discharged home in stable condition, her remaining clinical course is outlined below     Brief Problem Based Course:        Intractable nausea vomiting  PRN antiemetics  Tolerating oral intake.  Dispo: Home in stable condition with outpatient follow-up        Acute diarrhea presumed infectious  Norovirus-positive on GI PCR  -C. difficile negative  -GI PCR positive  -PRN symptom control   -Encourage hydration p.o. intake     Paroxysmal atrial fibrillation  - Continue Eliquis and Rythmol.      Acquired hypothyroidism  - Continue Synthroid.  -TSH 3.04     Hx of VB12 deficiency   - Continue daily supplements.        Diet ADULT DIET; Regular   DVT Prophylaxis [] Lovenox, []  Heparin, [] SCDs, [] Ambulation,  [x] Eliquis, [] Xarelto  [] Coumadin   Code Status Full Code   Disposition From: Home  Expected Disposition: Home  Estimated Date of Discharge: 1/27  Patient requires continued admission due to above   Surrogate Decision Maker/ POA Spouse            The patient expressed appropriate understanding of, and agreement with the discharge recommendations, medications, and plan.      Consults this admission:  IP CONSULT TO SPIRITUAL SERVICES     Discharge Diagnosis:   Acute diarrhea    Post-Discharge Transitional Care  Follow Up      Ebony Pace   YOB: 1940    Date of Office Visit:  1/29/2025  Date of Hospital Admission: 1/25/25  Date of Hospital Discharge:

## 2025-01-28 NOTE — DISCHARGE SUMMARY
V2.0  Discharge Summary    Name:  Ebony Pace /Age/Sex: 1940 (84 y.o. female)   Admit Date: 2025  Discharge Date: 25    MRN & CSN:  [id number] & [id number] Encounter Date and Time 25 7:39 PM EST    Attending:  No att. providers found Discharging Provider: RADHA Kurtz - [facility]       Hospital Course:     Brief HPI: Ebony Pace is a 84 y.o. female who presented with intractable nausea vomiting.  She was admitted 2025 due to concerns for severe diarrhea/nausea vomiting and dehydration signs.  She is on IV positive for norovirus and treated with symptom control.  Subsequently able to tolerate p.o. intake with antiemetics and discharged home in stable condition, her remaining clinical course is outlined below    Brief Problem Based Course:       Intractable nausea vomiting  PRN antiemetics  Tolerating oral intake.  Dispo: Home in stable condition with outpatient follow-up        Acute diarrhea presumed infectious  Norovirus-positive on GI PCR  -C. difficile negative  -GI PCR positive  -PRN symptom control   -Encourage hydration p.o. intake     Paroxysmal atrial fibrillation  - Continue Eliquis and Rythmol.      Acquired hypothyroidism  - Continue Synthroid.  -TSH 3.04     Hx of VB12 deficiency   - Continue daily supplements.        Diet ADULT DIET; Regular   DVT Prophylaxis [] Lovenox, []  Heparin, [] SCDs, [] Ambulation,  [x] Eliquis, [] Xarelto  [] Coumadin   Code Status Full Code   Disposition From: Home  Expected Disposition: Home  Estimated Date of Discharge:   Patient requires continued admission due to above   Surrogate Decision Maker/ POA Spouse         The patient expressed appropriate understanding of, and agreement with the discharge recommendations, medications, and plan.     Consults this admission:  IP CONSULT TO SPIRITUAL SERVICES    Discharge Diagnosis:   Acute diarrhea        Discharge Instruction:   Follow up appointments:   Primary care physician: Elijah Coyne

## 2025-01-28 NOTE — TELEPHONE ENCOUNTER
Care Transitions Initial Follow Up Call    Outreach made within 2 business days of discharge: Yes    Patient: Ebony Pace Patient : 1940   MRN: [id number]    Discharge Date: 25       Spoke with: Amber    Discharge department/facility: [facility] Interactive Patient Contact:  Was patient able to fill all prescriptions: Yes  Was patient instructed to bring all medications to the follow-up visit: Yes  Is patient taking all medications as directed in the discharge summary? Yes  Does patient understand their discharge instructions: Yes  Does patient have questions or concerns that need addressed prior to 7-14 day follow up office visit: no        Scheduled appointment with PCP within 7-14 days    Follow Up  Future Appointments   Date Time Provider Department Center   2025 10:45 AM Elijah Coyne MD [facility]   2025 10:15 AM Elijah Coyne MD [facility]       Johnson Watson MA

## 2025-01-29 ENCOUNTER — OFFICE VISIT (OUTPATIENT)
Dept: INTERNAL MEDICINE CLINIC | Age: 85
End: 2025-01-29

## 2025-01-29 VITALS
SYSTOLIC BLOOD PRESSURE: 120 MMHG | HEIGHT: 67 IN | BODY MASS INDEX: 23.57 KG/M2 | WEIGHT: 150.2 LBS | DIASTOLIC BLOOD PRESSURE: 60 MMHG | HEART RATE: 68 BPM | OXYGEN SATURATION: 96 %

## 2025-01-29 DIAGNOSIS — E44.1 MILD PROTEIN-CALORIE MALNUTRITION (HCC): ICD-10-CM

## 2025-01-29 DIAGNOSIS — E78.2 MIXED HYPERLIPIDEMIA: ICD-10-CM

## 2025-01-29 DIAGNOSIS — E77.8 HYPOPROTEINEMIA (HCC): ICD-10-CM

## 2025-01-29 DIAGNOSIS — E86.0 DEHYDRATION: ICD-10-CM

## 2025-01-29 DIAGNOSIS — E03.4 HYPOTHYROIDISM DUE TO ACQUIRED ATROPHY OF THYROID: ICD-10-CM

## 2025-01-29 DIAGNOSIS — R11.2 INTRACTABLE NAUSEA AND VOMITING: ICD-10-CM

## 2025-01-29 DIAGNOSIS — E53.8 B12 DEFICIENCY: ICD-10-CM

## 2025-01-29 DIAGNOSIS — Z09 HOSPITAL DISCHARGE FOLLOW-UP: ICD-10-CM

## 2025-01-29 DIAGNOSIS — A08.11 GASTROENTERITIS DUE TO NOROVIRUS: Primary | ICD-10-CM

## 2025-01-29 DIAGNOSIS — I48.0 PAROXYSMAL ATRIAL FIBRILLATION (HCC): ICD-10-CM

## 2025-01-29 RX ORDER — LEVOTHYROXINE SODIUM 137 MCG
137 TABLET ORAL DAILY
Qty: 90 TABLET | Refills: 1 | Status: SHIPPED | OUTPATIENT
Start: 2025-01-29 | End: 2025-07-28

## 2025-01-29 RX ORDER — PROPAFENONE HYDROCHLORIDE 225 MG/1
225 TABLET, FILM COATED ORAL 2 TIMES DAILY
Qty: 180 TABLET | Refills: 1 | Status: SHIPPED | OUTPATIENT
Start: 2025-01-29

## 2025-01-29 RX ORDER — DIPHENOXYLATE HYDROCHLORIDE AND ATROPINE SULFATE 2.5; .025 MG/1; MG/1
1 TABLET ORAL 2 TIMES DAILY PRN
Qty: 10 TABLET | Refills: 0 | Status: SHIPPED | OUTPATIENT
Start: 2025-01-29 | End: 2025-02-03

## 2025-01-29 RX ORDER — ONDANSETRON 4 MG/1
4 TABLET, ORALLY DISINTEGRATING ORAL EVERY 8 HOURS PRN
Qty: 15 TABLET | Refills: 0 | Status: SHIPPED | OUTPATIENT
Start: 2025-01-29

## 2025-01-29 ASSESSMENT — PATIENT HEALTH QUESTIONNAIRE - PHQ9
SUM OF ALL RESPONSES TO PHQ QUESTIONS 1-9: 0
SUM OF ALL RESPONSES TO PHQ9 QUESTIONS 1 & 2: 0
SUM OF ALL RESPONSES TO PHQ QUESTIONS 1-9: 0
1. LITTLE INTEREST OR PLEASURE IN DOING THINGS: NOT AT ALL
2. FEELING DOWN, DEPRESSED OR HOPELESS: NOT AT ALL

## 2025-01-30 ENCOUNTER — CARE COORDINATION (OUTPATIENT)
Dept: CARE COORDINATION | Age: 85
End: 2025-01-30

## 2025-01-30 NOTE — CARE COORDINATION
Date of referral: 1/28/25  Referral received from: CARLA Ramirez  Reason for referral: Requesting this SW contact Damaris to discuss any possible community resources     Attempted phone call to Pt's daughter, emergency contact, Damaris. No answer, vm message left requesting return call, contact number provided.     KIM plan of care: SW will make next outreach attempt on 2/4 to complete initial assessment.

## 2025-01-31 ENCOUNTER — CARE COORDINATION (OUTPATIENT)
Dept: CASE MANAGEMENT | Age: 85
End: 2025-01-31

## 2025-01-31 NOTE — CARE COORDINATION
Care Transitions Note    Follow Up Call     Patient Current Location:  Home: [address] [place] [address] W  [facility] [address]    Care Transition Nurse contacted the patient by telephone. Verified name and  as identifiers.    Additional needs identified to be addressed with provider   No needs identified                 Method of communication with provider: none.    Care Summary Note: Pt reports feeling much better but still weak . Is able to drink Gatorade and eat  chicken noodle soup. Saw PCP this week and was given Lomotil which has helped. Pt denies CP, SOB, palpitations or dizziness.Pt has antiemetic if needed. S/S of dehydration reinforced with Pt, dtr is a nurse and strong support for Pt. Also reinforced practicing good hand hygiene and good bathroom hygiene wiping front to back to avoid contamination that could cause UTI.Pt instructed to continue to take medications as ordered and contact MD/Bahman if symptoms present or increase in severity     Plan of care updates since last contact:  Review of patient management of conditions/medications: see above       Advance Care Planning:   Does patient have an Advance Directive: reviewed and needs to be updated.    Medication Review:  Medications changed since last call, reviewed today.     Remote Patient Monitoring:  Offered patient enrollment in the Remote Patient Monitoring (RPM) program for in-home monitoring: Patient is not eligible for RPM program because: affiliate provider.    Assessments:  Care Transitions Subsequent and Final Call    Schedule Follow Up Appointment with PCP: Completed  Subsequent and Final Calls  Do you have any ongoing symptoms?: No  Have your medications changed?: No  Do you have any questions related to your medications?: No  Do you currently have any active services?: No  Are you currently active with any services?: Home Health  Identified Barriers: Other  Care Transitions Interventions   Home Care Waiver: Declined        Transportation

## 2025-02-04 ENCOUNTER — CARE COORDINATION (OUTPATIENT)
Dept: CARE COORDINATION | Age: 85
End: 2025-02-04

## 2025-02-04 NOTE — CARE COORDINATION
Date of referral: 1/28/25  Referral received from: CARLA Ramirez  Reason for referral: CTN is requesting a call to dtr, Damaris to discuss community resources  2nd outreach attempt. No answer, vm message left requesting return call, contact number provided.     KIM plan of care: SW will make next outreach attempt on 2/14 to complete initial assessment and discuss community resources.

## 2025-02-05 ENCOUNTER — CARE COORDINATION (OUTPATIENT)
Dept: CASE MANAGEMENT | Age: 85
End: 2025-02-05

## 2025-02-05 NOTE — CARE COORDINATION
Care Transitions Note    Follow Up Call     Patient Current Location:  Home: [address] [place] [address] [address]  [facility] [address]    Care Transition Nurse contacted the patient by telephone. Verified name and  as identifiers.    Additional needs identified to be addressed with provider   No needs identified                 Method of communication with provider: none.    Care Summary Note:  Pt reports feeling much better, is still careful about what she is eating. Drinks Gatorade , water and cranberry juice and tea. CTN encouraged Pt to try and eat multiple small snacks t/o the day to help with strength and maintain BG. Pt states she has some peanut butter crackers and has been also eating beef broth and other soups. No more reports of N/V/D. Pt denies CP, SOB, palpitations or dizziness.S/S of dehydration reinforced with Pt.  Pt states she has orders for labs to be obtained on  and a f/u w/ Dr Coyne on . CTN to f/u on 02/10.Pt instructed to continue to take medications as ordered and contact MD/91Ricardo if symptoms present or increase in severity     Plan of care updates since last contact:  Review of patient management of conditions/medications: see above       Advance Care Planning:   Does patient have an Advance Directive: reviewed during previous call, see note. .    Medication Review:  No changes since last call.     Remote Patient Monitoring:  Offered patient enrollment in the Remote Patient Monitoring (RPM) program for in-home monitoring: Patient is not eligible for RPM program because: affiliate provider.    Assessments:  Care Transitions Subsequent and Final Call    Subsequent and Final Calls  Do you have any ongoing symptoms?: No  Have your medications changed?: No  Do you have any questions related to your medications?: No  Are you currently active with any services?: Home Health  Do you have any needs or concerns that I can assist you with?: No  Identified Barriers: Other  Care Transitions

## 2025-02-06 ENCOUNTER — HOSPITAL ENCOUNTER (OUTPATIENT)
Age: 85
Discharge: HOME | End: 2025-02-06
Payer: MEDICARE

## 2025-02-06 DIAGNOSIS — R11.2 INTRACTABLE NAUSEA AND VOMITING: ICD-10-CM

## 2025-02-06 DIAGNOSIS — I48.0 PAROXYSMAL ATRIAL FIBRILLATION (HCC): ICD-10-CM

## 2025-02-06 DIAGNOSIS — A08.11 GASTROENTERITIS DUE TO NOROVIRUS: ICD-10-CM

## 2025-02-06 DIAGNOSIS — E44.1 MILD PROTEIN-CALORIE MALNUTRITION (HCC): ICD-10-CM

## 2025-02-06 DIAGNOSIS — E77.8 HYPOPROTEINEMIA (HCC): ICD-10-CM

## 2025-02-06 DIAGNOSIS — E78.2 MIXED HYPERLIPIDEMIA: ICD-10-CM

## 2025-02-06 DIAGNOSIS — E86.0 DEHYDRATION: ICD-10-CM

## 2025-02-06 LAB
ALBUMIN SERPL-MCNC: 3.8 G/DL (ref 3.4–5)
ALBUMIN/GLOB SERPL: 1.5 {RATIO} (ref 1.1–2.2)
ALP SERPL-CCNC: 83 U/L (ref 40–129)
ALT SERPL-CCNC: 23 U/L (ref 10–40)
ANION GAP SERPL CALCULATED.3IONS-SCNC: 9 MMOL/L (ref 9–17)
AST SERPL-CCNC: 26 U/L (ref 15–37)
BASOPHILS # BLD: 0.01 K/UL
BASOPHILS NFR BLD: 0 % (ref 0–1)
BILIRUB SERPL-MCNC: 0.5 MG/DL (ref 0–1)
BUN SERPL-MCNC: 11 MG/DL (ref 7–20)
CALCIUM SERPL-MCNC: 9 MG/DL (ref 8.3–10.6)
CHLORIDE SERPL-SCNC: 102 MMOL/L (ref 99–110)
CO2 SERPL-SCNC: 29 MMOL/L (ref 21–32)
CREAT SERPL-MCNC: 0.8 MG/DL (ref 0.6–1.2)
EOSINOPHIL # BLD: 0 K/UL
EOSINOPHILS RELATIVE PERCENT: 0 % (ref 0–3)
ERYTHROCYTE [DISTWIDTH] IN BLOOD BY AUTOMATED COUNT: 12.4 % (ref 11.7–14.9)
GFR, ESTIMATED: 71 ML/MIN/1.73M2
GLUCOSE SERPL-MCNC: 86 MG/DL (ref 74–99)
HCT VFR BLD AUTO: 39.7 % (ref 37–47)
HGB BLD-MCNC: 12.7 G/DL (ref 12.5–16)
IMM GRANULOCYTES # BLD AUTO: 0.04 K/UL
IMM GRANULOCYTES NFR BLD: 1 %
LYMPHOCYTES NFR BLD: 1.47 K/UL
LYMPHOCYTES RELATIVE PERCENT: 33 % (ref 24–44)
MCH RBC QN AUTO: 30.7 PG (ref 27–31)
MCHC RBC AUTO-ENTMCNC: 32 G/DL (ref 32–36)
MCV RBC AUTO: 95.9 FL (ref 78–100)
MONOCYTES NFR BLD: 0.52 K/UL
MONOCYTES NFR BLD: 12 % (ref 0–4)
NEUTROPHILS NFR BLD: 55 % (ref 36–66)
NEUTS SEG NFR BLD: 2.47 K/UL
PLATELET # BLD AUTO: 213 K/UL (ref 140–440)
PMV BLD AUTO: 9.2 FL (ref 7.5–11.1)
POTASSIUM SERPL-SCNC: 4.2 MMOL/L (ref 3.5–5.1)
PROT SERPL-MCNC: 6.3 G/DL (ref 6.4–8.2)
RBC # BLD AUTO: 4.14 M/UL (ref 4.2–5.4)
SODIUM SERPL-SCNC: 140 MMOL/L (ref 136–145)
WBC OTHER # BLD: 4.5 K/UL (ref 4–10.5)

## 2025-02-06 PROCEDURE — 85025 COMPLETE CBC W/AUTO DIFF WBC: CPT

## 2025-02-06 PROCEDURE — 80053 COMPREHEN METABOLIC PANEL: CPT

## 2025-02-07 DIAGNOSIS — K21.9 GASTROESOPHAGEAL REFLUX DISEASE WITHOUT ESOPHAGITIS: ICD-10-CM

## 2025-02-07 DIAGNOSIS — A08.11 GASTROENTERITIS DUE TO NOROVIRUS: Primary | ICD-10-CM

## 2025-02-07 RX ORDER — FAMOTIDINE 20 MG/1
20 TABLET, FILM COATED ORAL 2 TIMES DAILY
Qty: 60 TABLET | Refills: 3 | Status: SHIPPED | OUTPATIENT
Start: 2025-02-07

## 2025-02-07 RX ORDER — PREDNISONE 5 MG/1
TABLET ORAL
Qty: 21 TABLET | Refills: 0 | Status: SHIPPED | OUTPATIENT
Start: 2025-02-07

## 2025-02-07 NOTE — RESULT ENCOUNTER NOTE
Please call pt, Ebony's labs are all also much better indicating her infection is clearing.  Protein stores improved, prior dehydration and high sugars have normalized, blood counts are normal

## 2025-02-11 ENCOUNTER — CARE COORDINATION (OUTPATIENT)
Dept: CASE MANAGEMENT | Age: 85
End: 2025-02-11

## 2025-02-11 NOTE — CARE COORDINATION
Care Transitions Note    Follow Up Call     Patient Current Location:  Home: [address] [place] [address] W  [facility] [address]    Care Transition Nurse contacted the patient by telephone. Verified name and  as identifiers.    Additional needs identified to be addressed with provider   No needs identified                 Method of communication with provider: none.    Care Summary Note: Pt reports diarrhea has resolved, appetite is back to normal w/ no blood in stool/ Denies N/V. Labs improved per PCP. Pt reports weakness/fatigue improving as well. Pt states she remains well hydrated. No reports of CP, palpitations, SOB  or dizziness. Pt agreeable to additional calls    Plan of care updates since last contact:  Review of patient management of conditions/medications: see above       Advance Care Planning:   Does patient have an Advance Directive: reviewed and current and reviewed during previous call, see note. .    Medication Review:  No changes since last call.     Remote Patient Monitoring:  Offered patient enrollment in the Remote Patient Monitoring (RPM) program for in-home monitoring: Patient is not eligible for RPM program because: affiliate provider.    Assessments:  Care Transitions Subsequent and Final Call    Subsequent and Final Calls  Do you have any ongoing symptoms?: No  Have your medications changed?: No  Do you have any questions related to your medications?: No  Do you currently have any active services?: Yes  Are you currently active with any services?: Home Health  Do you have any needs or concerns that I can assist you with?: No  Identified Barriers: Medication Side Effects  Care Transitions Interventions   Home Care Waiver: Declined        Transportation Support: Declined    Meals on Wheels: Completed Registered Dietician: Declined DME Assistance: Declined     Senior Services: Completed     Medication Assistance Program: Declined       Social Work: Completed    Other Interventions:

## 2025-02-14 ENCOUNTER — CARE COORDINATION (OUTPATIENT)
Dept: CARE COORDINATION | Age: 85
End: 2025-02-14

## 2025-02-14 NOTE — CARE COORDINATION
Patient Current Location: Home: [address] [place] [address] W  [facility] [address]     Initial Contact Social Work Note - Ambulatory  2/14/2025      Date of referral: 1/28/24  Referral received from: CARLA Ramirez  Reason for referral: community resources    Previous  referral: No  If yes, brief summary of outcome: n/a    Two Identifiers Verified: Yes    Insurance Provider: [employer] P[employer]    Support System:  Adult Child/Children     Status:  unk    Community Providers:  n/a    ADL Assistance Needed: N/A    Housing/Living Concerns or Home Modification Needs: Pt resides with spouse who has dementia     Transportation Concern: no concerns, family provides    Medication Cost Concern: none reported     Medication Adherence Concern: n/a    Financial Concern(s): none reported    Income (only if applicable): unk    Ability to Read/Write: Yes    Advance Care Plan:  N/A    Other: Family got norovirus  Checked out senior services, Pt's concern was the parking, family offered to drop off  Spouse - dementia - wakes at 3am, requesting resources for possible overnight HHA.   Adult children - make sure parents are eating  MOW - provided education  Overnight care - private pay     Passport discussed - does not think they'd be eligible  Discussed private pay options - discussed rates are higher when overnight.     Email sent to Damaris with information regarding Passport and private pay options for HHA.    SW plan of care: SW will resolve from  services.

## 2025-02-18 ENCOUNTER — CARE COORDINATION (OUTPATIENT)
Dept: CASE MANAGEMENT | Age: 85
End: 2025-02-18

## 2025-02-18 NOTE — CARE COORDINATION
Care Transitions Note    Follow Up Call     Attempted to reach patient for transitions of care follow up.  Unable to reach patient.      Outreach Attempts:   Unable to leave message. No answer. No voicemail.        Follow Up Appointment:   Future Appointments         Provider Specialty Dept Phone    7/24/2025 9:30 AM Elijah Coyne MD Internal Medicine [phone]            Plan for follow-up call in 2-5 days based on inability to reach patient today.    Ling Navarro RN BSN  Care Transition Nurse  [phone]

## 2025-02-25 ENCOUNTER — CARE COORDINATION (OUTPATIENT)
Dept: CASE MANAGEMENT | Age: 85
End: 2025-02-25

## 2025-02-25 NOTE — CARE COORDINATION
Care Transitions Note    Final Call     Patient Current Location:  Home: [address] [place] [address] W  [place] [id number]    Care Transition Nurse contacted the patient by telephone. Verified name and  as identifiers.    Patient graduated from the Care Transitions program on 25.  Patient/family verbalizes confidence in the ability to self-manage at this time..      Advance Care Planning:   Does patient have an Advance Directive: reviewed and current.    Handoff:   Patient was not referred to the [facility] team due to patient declined services.      Care Summary Note: Pt reports \"feeling great \" states appetite , B//B are all back to \" normal\"  Denies N/VD , no reports of CP, palpitations , SOB or dizziness.No med changes since last call. Pt denies wagner additional needs and declines offers to transfer to [facility] for further calls    Assessments:  Care Transitions Subsequent and Final Call    Subsequent and Final Calls  Do you have any ongoing symptoms?: No  Have your medications changed?: No  Do you have any questions related to your medications?: No  Do you currently have any active services?: Yes  Are you currently active with any services?: Home Health  Do you have any needs or concerns that I can assist you with?: No  Identified Barriers: None  Care Transitions Interventions  No Identified Needs   Home Care Waiver: Declined        Transportation Support: Declined    Meals on Wheels: Completed Registered Dietician: Declined DME Assistance: Declined     Senior Services: Completed     Medication Assistance Program: Declined       Social Work: Completed    Other Interventions:              Upcoming Appointments:    Future Appointments         Provider Specialty Dept Phone    2025 9:30 AM Elijah Coyne MD Internal Medicine [phone]            Patient has agreed to contact primary care provider and/or specialist for any further questions, concerns, or needs.    Terri Ramirez RN

## 2025-04-24 ENCOUNTER — HOSPITAL ENCOUNTER (OUTPATIENT)
Dept: WOMENS IMAGING | Age: 85
Discharge: HOME | End: 2025-04-24
Payer: MEDICARE

## 2025-04-24 DIAGNOSIS — Z12.31 ENCOUNTER FOR SCREENING MAMMOGRAM FOR MALIGNANT NEOPLASM OF BREAST: ICD-10-CM

## 2025-04-24 PROCEDURE — 77063 BREAST TOMOSYNTHESIS BI: CPT

## 2025-04-28 RX ORDER — APIXABAN 5 MG/1
5 TABLET, FILM COATED ORAL 2 TIMES DAILY
Qty: 180 TABLET | Refills: 3 | Status: SHIPPED | OUTPATIENT
Start: 2025-04-28

## 2025-05-16 DIAGNOSIS — I48.0 PAROXYSMAL ATRIAL FIBRILLATION (HCC): ICD-10-CM

## 2025-05-16 RX ORDER — PROPAFENONE HYDROCHLORIDE 225 MG/1
225 TABLET, FILM COATED ORAL 2 TIMES DAILY
Qty: 180 TABLET | Refills: 0 | Status: SHIPPED | OUTPATIENT
Start: 2025-05-16

## 2025-07-16 ENCOUNTER — OFFICE VISIT (OUTPATIENT)
Dept: INTERNAL MEDICINE CLINIC | Age: 85
End: 2025-07-16
Payer: MEDICARE

## 2025-07-16 VITALS
SYSTOLIC BLOOD PRESSURE: 116 MMHG | BODY MASS INDEX: 22.52 KG/M2 | WEIGHT: 143.8 LBS | OXYGEN SATURATION: 99 % | DIASTOLIC BLOOD PRESSURE: 60 MMHG | HEART RATE: 73 BPM

## 2025-07-16 DIAGNOSIS — R30.9 PAINFUL URINATION: Primary | ICD-10-CM

## 2025-07-16 DIAGNOSIS — J01.00 ACUTE NON-RECURRENT MAXILLARY SINUSITIS: ICD-10-CM

## 2025-07-16 DIAGNOSIS — N30.01 ACUTE CYSTITIS WITH HEMATURIA: ICD-10-CM

## 2025-07-16 LAB
BILIRUBIN, POC: NORMAL
BLOOD URINE, POC: NORMAL
CLARITY, POC: CLEAR
COLOR, POC: NORMAL
GLUCOSE URINE, POC: NORMAL MG/DL
KETONES, POC: NORMAL MG/DL
LEUKOCYTE EST, POC: NORMAL
NITRITE, POC: NORMAL
PH, POC: 6
PROTEIN, POC: NORMAL MG/DL
SPECIFIC GRAVITY, POC: 1.01
UROBILINOGEN, POC: 0.2 MG/DL

## 2025-07-16 PROCEDURE — 99213 OFFICE O/P EST LOW 20 MIN: CPT | Performed by: INTERNAL MEDICINE

## 2025-07-16 PROCEDURE — G8427 DOCREV CUR MEDS BY ELIG CLIN: HCPCS | Performed by: INTERNAL MEDICINE

## 2025-07-16 PROCEDURE — 1160F RVW MEDS BY RX/DR IN RCRD: CPT | Performed by: INTERNAL MEDICINE

## 2025-07-16 PROCEDURE — 1090F PRES/ABSN URINE INCON ASSESS: CPT | Performed by: INTERNAL MEDICINE

## 2025-07-16 PROCEDURE — 1159F MED LIST DOCD IN RCRD: CPT | Performed by: INTERNAL MEDICINE

## 2025-07-16 PROCEDURE — 1123F ACP DISCUSS/DSCN MKR DOCD: CPT | Performed by: INTERNAL MEDICINE

## 2025-07-16 PROCEDURE — G8420 CALC BMI NORM PARAMETERS: HCPCS | Performed by: INTERNAL MEDICINE

## 2025-07-16 PROCEDURE — 1036F TOBACCO NON-USER: CPT | Performed by: INTERNAL MEDICINE

## 2025-07-16 PROCEDURE — 81002 URINALYSIS NONAUTO W/O SCOPE: CPT | Performed by: INTERNAL MEDICINE

## 2025-07-16 RX ORDER — SULFAMETHOXAZOLE AND TRIMETHOPRIM 800; 160 MG/1; MG/1
1 TABLET ORAL 2 TIMES DAILY
Qty: 14 TABLET | Refills: 0 | Status: SHIPPED | OUTPATIENT
Start: 2025-07-16 | End: 2025-07-23

## 2025-07-16 NOTE — PROGRESS NOTES
Ebony Pace  1940 07/16/25    SUBJECTIVE:    Two week hx of some dry mouth and also sinus congestion, drainage.      No fevers, now also the past few days also w dysuria and abd aching.  UA poct pos today.  Does not drink water regularly.      She does have significant stress worrying about her  Mansoor, who has some progressive dementia.  Did suggest she needs a break to decrease overall stress, he would be a good candidate for respite care briefly 1 or 2 times a week at the [facility] and they will consider  OBJECTIVE:    /60 (BP Site: Left Upper Arm, Patient Position: Sitting, BP Cuff Size: Medium Adult)   Pulse 73   Wt 65.2 kg (143 lb 12.8 oz)   SpO2 99%   BMI 22.52 kg/m²     Physical Exam  Vitals reviewed.   Constitutional:       Appearance: She is well-developed.   HENT:      Head: Normocephalic and atraumatic.      Nose: Nose normal.      Mouth/Throat:      Mouth: Mucous membranes are moist.      Pharynx: Oropharynx is clear. No oropharyngeal exudate.   Eyes:      General: No scleral icterus.        Right eye: No discharge.         Left eye: No discharge.      Conjunctiva/sclera: Conjunctivae normal.      Pupils: Pupils are equal, round, and reactive to light.   Neck:      Thyroid: No thyromegaly.      Vascular: No JVD.      Trachea: No tracheal deviation.   Cardiovascular:      Rate and Rhythm: Normal rate and regular rhythm.      Heart sounds: Normal heart sounds. No murmur heard.     No friction rub. No gallop.   Pulmonary:      Effort: Pulmonary effort is normal. No respiratory distress.      Breath sounds: Normal breath sounds. No wheezing or rales.   Abdominal:      General: Bowel sounds are normal. There is no distension.      Palpations: Abdomen is soft. There is no mass.      Tenderness: There is no abdominal tenderness. There is no guarding or rebound.      Hernia: No hernia is present.   Musculoskeletal:      Cervical back: Neck supple.      Right lower leg: No

## 2025-07-22 ENCOUNTER — HOSPITAL ENCOUNTER (OUTPATIENT)
Age: 85
Discharge: HOME | End: 2025-07-22
Payer: MEDICARE

## 2025-07-22 DIAGNOSIS — E86.0 DEHYDRATION: ICD-10-CM

## 2025-07-22 DIAGNOSIS — I48.0 PAROXYSMAL ATRIAL FIBRILLATION (HCC): ICD-10-CM

## 2025-07-22 DIAGNOSIS — R30.9 PAINFUL URINATION: ICD-10-CM

## 2025-07-22 DIAGNOSIS — E78.2 MIXED HYPERLIPIDEMIA: ICD-10-CM

## 2025-07-22 DIAGNOSIS — E03.4 HYPOTHYROIDISM DUE TO ACQUIRED ATROPHY OF THYROID: ICD-10-CM

## 2025-07-22 DIAGNOSIS — N30.01 ACUTE CYSTITIS WITH HEMATURIA: ICD-10-CM

## 2025-07-22 DIAGNOSIS — E77.8 HYPOPROTEINEMIA: ICD-10-CM

## 2025-07-22 DIAGNOSIS — E53.8 B12 DEFICIENCY: ICD-10-CM

## 2025-07-22 DIAGNOSIS — R11.2 INTRACTABLE NAUSEA AND VOMITING: ICD-10-CM

## 2025-07-22 DIAGNOSIS — E44.1 MILD PROTEIN-CALORIE MALNUTRITION: ICD-10-CM

## 2025-07-22 DIAGNOSIS — A08.11 GASTROENTERITIS DUE TO NOROVIRUS: ICD-10-CM

## 2025-07-22 LAB
ABSOLUTE BANDS: 0.11 K/UL
ALBUMIN SERPL-MCNC: 4.2 G/DL (ref 3.4–5)
ALBUMIN/GLOB SERPL: 1.4 {RATIO} (ref 1.1–2.2)
ALP SERPL-CCNC: 84 U/L (ref 40–129)
ALT SERPL-CCNC: 17 U/L (ref 10–40)
ANION GAP SERPL CALCULATED.3IONS-SCNC: 11 MMOL/L (ref 9–17)
AST SERPL-CCNC: 25 U/L (ref 15–37)
ATYPICAL LYMPHOCYTE ABSOLUTE COUNT: 0.04 K/UL
ATYPICAL LYMPHOCYTES: ABNORMAL %
BANDS: 7 %
BASOPHILS # BLD: 0 K/UL
BASOPHILS NFR BLD: 0 % (ref 0–1)
BILIRUB SERPL-MCNC: 0.4 MG/DL (ref 0–1)
BILIRUB UR QL STRIP: NEGATIVE
BUN SERPL-MCNC: 18 MG/DL (ref 7–20)
CALCIUM SERPL-MCNC: 9.3 MG/DL (ref 8.3–10.6)
CHLORIDE SERPL-SCNC: 101 MMOL/L (ref 99–110)
CHOLEST SERPL-MCNC: 174 MG/DL (ref 125–199)
CLARITY UR: CLEAR
CO2 SERPL-SCNC: 24 MMOL/L (ref 21–32)
COLOR UR: YELLOW
CREAT SERPL-MCNC: 1.1 MG/DL (ref 0.6–1.2)
EOSINOPHIL # BLD: 0 K/UL
EOSINOPHILS RELATIVE PERCENT: 1 % (ref 0–3)
ERYTHROCYTE [DISTWIDTH] IN BLOOD BY AUTOMATED COUNT: 12.1 % (ref 11.7–14.9)
GFR, ESTIMATED: 46 ML/MIN/1.73M2
GLUCOSE SERPL-MCNC: 86 MG/DL (ref 74–99)
GLUCOSE UR STRIP-MCNC: NEGATIVE MG/DL
HCT VFR BLD AUTO: 40.6 % (ref 37–47)
HDLC SERPL-MCNC: 44 MG/DL
HGB BLD-MCNC: 13.4 G/DL (ref 12.5–16)
HGB UR QL STRIP.AUTO: NEGATIVE
KETONES UR STRIP-MCNC: NEGATIVE MG/DL
LDLC SERPL CALC-MCNC: 95 MG/DL
LEUKOCYTE ESTERASE UR QL STRIP: ABNORMAL
LYMPHOCYTES NFR BLD: 0.52 K/UL
LYMPHOCYTES RELATIVE PERCENT: 34 % (ref 24–44)
MAGNESIUM SERPL-MCNC: 2.1 MG/DL (ref 1.8–2.4)
MCH RBC QN AUTO: 31.6 PG (ref 27–31)
MCHC RBC AUTO-ENTMCNC: 33 G/DL (ref 32–36)
MCV RBC AUTO: 95.8 FL (ref 78–100)
METAMYELOCYTES ABSOLUTE COUNT: 0.04 K/UL
METAMYELOCYTES: 1 %
MONOCYTES NFR BLD: 0.22 K/UL
MONOCYTES NFR BLD: 12 % (ref 0–5)
NEUTROPHILS NFR BLD: 45 % (ref 36–66)
NEUTS SEG NFR BLD: 2.78 K/UL
NITRITE UR QL STRIP: NEGATIVE
PH UR STRIP: 6 [PH] (ref 5–8)
PLATELET # BLD AUTO: 147 K/UL (ref 140–440)
PLATELET ESTIMATE: NORMAL
PMV BLD AUTO: 9.3 FL (ref 7.5–11.1)
POTASSIUM SERPL-SCNC: 4.8 MMOL/L (ref 3.5–5.1)
PROT SERPL-MCNC: 7.2 G/DL (ref 6.4–8.2)
PROT UR STRIP-MCNC: NEGATIVE MG/DL
RBC # BLD AUTO: 4.24 M/UL (ref 4.2–5.4)
RBC # BLD: ABNORMAL 10*6/UL
RBC # BLD: NORMAL 10*6/UL
SODIUM SERPL-SCNC: 135 MMOL/L (ref 136–145)
SP GR UR STRIP: 1.02 (ref 1–1.03)
T4 FREE SERPL-MCNC: 1.4 NG/DL (ref 0.9–1.8)
TRIGL SERPL-MCNC: 173 MG/DL
TSH SERPL DL<=0.05 MIU/L-ACNC: 3.49 UIU/ML (ref 0.27–4.2)
UROBILINOGEN UR STRIP-ACNC: 0.2 EU/DL (ref 0–1)
VIT B12 SERPL-MCNC: 816 PG/ML (ref 211–911)
WBC # BLD: ABNORMAL 10*3/UL
WBC # BLD: ABNORMAL 10*3/UL
WBC OTHER # BLD: 3.7 K/UL (ref 4–10.5)

## 2025-07-22 PROCEDURE — 80053 COMPREHEN METABOLIC PANEL: CPT

## 2025-07-22 PROCEDURE — 85025 COMPLETE CBC W/AUTO DIFF WBC: CPT

## 2025-07-22 PROCEDURE — 83735 ASSAY OF MAGNESIUM: CPT

## 2025-07-22 PROCEDURE — 80061 LIPID PANEL: CPT

## 2025-07-22 PROCEDURE — 82607 VITAMIN B-12: CPT

## 2025-07-22 PROCEDURE — 84439 ASSAY OF FREE THYROXINE: CPT

## 2025-07-22 PROCEDURE — 84443 ASSAY THYROID STIM HORMONE: CPT

## 2025-07-23 ENCOUNTER — OFFICE VISIT (OUTPATIENT)
Dept: INTERNAL MEDICINE CLINIC | Age: 85
End: 2025-07-23
Payer: MEDICARE

## 2025-07-23 VITALS
SYSTOLIC BLOOD PRESSURE: 124 MMHG | BODY MASS INDEX: 22.08 KG/M2 | OXYGEN SATURATION: 98 % | DIASTOLIC BLOOD PRESSURE: 64 MMHG | WEIGHT: 141 LBS | HEART RATE: 71 BPM

## 2025-07-23 DIAGNOSIS — Z00.00 MEDICARE ANNUAL WELLNESS VISIT, SUBSEQUENT: Primary | ICD-10-CM

## 2025-07-23 DIAGNOSIS — I48.0 PAROXYSMAL ATRIAL FIBRILLATION (HCC): ICD-10-CM

## 2025-07-23 DIAGNOSIS — E78.2 MIXED HYPERLIPIDEMIA: ICD-10-CM

## 2025-07-23 DIAGNOSIS — E53.8 B12 DEFICIENCY: ICD-10-CM

## 2025-07-23 DIAGNOSIS — E03.4 HYPOTHYROIDISM DUE TO ACQUIRED ATROPHY OF THYROID: ICD-10-CM

## 2025-07-23 DIAGNOSIS — M81.0 AGE-RELATED OSTEOPOROSIS WITHOUT CURRENT PATHOLOGICAL FRACTURE: ICD-10-CM

## 2025-07-23 DIAGNOSIS — Z00.00 ROUTINE GENERAL MEDICAL EXAMINATION AT A HEALTH CARE FACILITY: ICD-10-CM

## 2025-07-23 PROCEDURE — G8420 CALC BMI NORM PARAMETERS: HCPCS | Performed by: INTERNAL MEDICINE

## 2025-07-23 PROCEDURE — G0439 PPPS, SUBSEQ VISIT: HCPCS | Performed by: INTERNAL MEDICINE

## 2025-07-23 PROCEDURE — G8427 DOCREV CUR MEDS BY ELIG CLIN: HCPCS | Performed by: INTERNAL MEDICINE

## 2025-07-23 PROCEDURE — 99214 OFFICE O/P EST MOD 30 MIN: CPT | Performed by: INTERNAL MEDICINE

## 2025-07-23 PROCEDURE — 1036F TOBACCO NON-USER: CPT | Performed by: INTERNAL MEDICINE

## 2025-07-23 PROCEDURE — 1160F RVW MEDS BY RX/DR IN RCRD: CPT | Performed by: INTERNAL MEDICINE

## 2025-07-23 PROCEDURE — 1090F PRES/ABSN URINE INCON ASSESS: CPT | Performed by: INTERNAL MEDICINE

## 2025-07-23 PROCEDURE — 1159F MED LIST DOCD IN RCRD: CPT | Performed by: INTERNAL MEDICINE

## 2025-07-23 PROCEDURE — 1123F ACP DISCUSS/DSCN MKR DOCD: CPT | Performed by: INTERNAL MEDICINE

## 2025-07-23 RX ORDER — LEVOTHYROXINE SODIUM 137 MCG
137 TABLET ORAL DAILY
Qty: 90 TABLET | Refills: 1 | Status: SHIPPED | OUTPATIENT
Start: 2025-07-23 | End: 2026-01-19

## 2025-07-23 RX ORDER — CIDER VINEGAR 300 MG
3000 TABLET ORAL
COMMUNITY

## 2025-07-23 ASSESSMENT — PATIENT HEALTH QUESTIONNAIRE - PHQ9
1. LITTLE INTEREST OR PLEASURE IN DOING THINGS: NOT AT ALL
2. FEELING DOWN, DEPRESSED OR HOPELESS: NOT AT ALL
SUM OF ALL RESPONSES TO PHQ QUESTIONS 1-9: 0

## 2025-07-23 ASSESSMENT — LIFESTYLE VARIABLES
HOW OFTEN DO YOU HAVE A DRINK CONTAINING ALCOHOL: NEVER
HOW MANY STANDARD DRINKS CONTAINING ALCOHOL DO YOU HAVE ON A TYPICAL DAY: PATIENT DOES NOT DRINK

## 2025-09-03 ENCOUNTER — TELEPHONE (OUTPATIENT)
Dept: INTERNAL MEDICINE CLINIC | Age: 85
End: 2025-09-03

## 2025-09-04 ENCOUNTER — OFFICE VISIT (OUTPATIENT)
Dept: INTERNAL MEDICINE CLINIC | Age: 85
End: 2025-09-04
Payer: MEDICARE

## 2025-09-04 VITALS
BODY MASS INDEX: 21.98 KG/M2 | DIASTOLIC BLOOD PRESSURE: 62 MMHG | WEIGHT: 140.4 LBS | OXYGEN SATURATION: 99 % | SYSTOLIC BLOOD PRESSURE: 124 MMHG | HEART RATE: 69 BPM

## 2025-09-04 DIAGNOSIS — K64.4 EXTERNAL HEMORRHOID, BLEEDING: Primary | ICD-10-CM

## 2025-09-04 DIAGNOSIS — N81.10 BLADDER PROLAPSE, FEMALE, ACQUIRED: ICD-10-CM

## 2025-09-04 DIAGNOSIS — N30.00 ACUTE CYSTITIS WITHOUT HEMATURIA: ICD-10-CM

## 2025-09-04 DIAGNOSIS — R31.9 HEMATURIA, UNSPECIFIED TYPE: ICD-10-CM

## 2025-09-04 LAB
BILIRUBIN, POC: NEGATIVE
BLOOD URINE, POC: ABNORMAL
CLARITY, POC: ABNORMAL
COLOR, POC: YELLOW
GLUCOSE URINE, POC: NEGATIVE MG/DL
KETONES, POC: NEGATIVE MG/DL
LEUKOCYTE EST, POC: ABNORMAL
NITRITE, POC: POSITIVE
PH, POC: 6
PROTEIN, POC: 6 MG/DL
SPECIFIC GRAVITY, POC: 1.02
UROBILINOGEN, POC: 0.2 MG/DL

## 2025-09-04 PROCEDURE — 1036F TOBACCO NON-USER: CPT | Performed by: INTERNAL MEDICINE

## 2025-09-04 PROCEDURE — 1123F ACP DISCUSS/DSCN MKR DOCD: CPT | Performed by: INTERNAL MEDICINE

## 2025-09-04 PROCEDURE — 1159F MED LIST DOCD IN RCRD: CPT | Performed by: INTERNAL MEDICINE

## 2025-09-04 PROCEDURE — 1160F RVW MEDS BY RX/DR IN RCRD: CPT | Performed by: INTERNAL MEDICINE

## 2025-09-04 PROCEDURE — G8427 DOCREV CUR MEDS BY ELIG CLIN: HCPCS | Performed by: INTERNAL MEDICINE

## 2025-09-04 PROCEDURE — G8420 CALC BMI NORM PARAMETERS: HCPCS | Performed by: INTERNAL MEDICINE

## 2025-09-04 PROCEDURE — 81002 URINALYSIS NONAUTO W/O SCOPE: CPT | Performed by: INTERNAL MEDICINE

## 2025-09-04 PROCEDURE — 99213 OFFICE O/P EST LOW 20 MIN: CPT | Performed by: INTERNAL MEDICINE

## 2025-09-04 PROCEDURE — G2211 COMPLEX E/M VISIT ADD ON: HCPCS | Performed by: INTERNAL MEDICINE

## 2025-09-04 PROCEDURE — 1090F PRES/ABSN URINE INCON ASSESS: CPT | Performed by: INTERNAL MEDICINE

## 2025-09-04 RX ORDER — FAMOTIDINE 20 MG/1
20 TABLET, FILM COATED ORAL 2 TIMES DAILY
COMMUNITY
Start: 2025-02-07

## 2025-09-04 RX ORDER — HYDROCORTISONE ACETATE 25 MG/1
25 SUPPOSITORY RECTAL 2 TIMES DAILY PRN
Qty: 12 SUPPOSITORY | Refills: 0 | Status: SHIPPED | OUTPATIENT
Start: 2025-09-04

## 2025-09-04 RX ORDER — HYDROCORTISONE 25 MG/G
CREAM TOPICAL
Qty: 28 G | Refills: 0 | Status: SHIPPED | OUTPATIENT
Start: 2025-09-04 | End: 2025-09-04

## 2025-09-04 RX ORDER — CIPROFLOXACIN 250 MG/1
250 TABLET, FILM COATED ORAL 2 TIMES DAILY
Qty: 6 TABLET | Refills: 0 | Status: SHIPPED | OUTPATIENT
Start: 2025-09-04 | End: 2025-09-07